# Patient Record
Sex: MALE | Race: BLACK OR AFRICAN AMERICAN | NOT HISPANIC OR LATINO | Employment: UNEMPLOYED | URBAN - METROPOLITAN AREA
[De-identification: names, ages, dates, MRNs, and addresses within clinical notes are randomized per-mention and may not be internally consistent; named-entity substitution may affect disease eponyms.]

---
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
## 2020-08-01 ENCOUNTER — HOSPITAL ENCOUNTER (EMERGENCY)
Facility: MEDICAL CENTER | Age: 20
End: 2020-08-02
Attending: EMERGENCY MEDICINE
Payer: COMMERCIAL

## 2020-08-01 DIAGNOSIS — F15.929 METHAMPHETAMINE INTOXICATION (HCC): ICD-10-CM

## 2020-08-01 PROCEDURE — 99283 EMERGENCY DEPT VISIT LOW MDM: CPT

## 2020-08-01 SDOH — HEALTH STABILITY: MENTAL HEALTH: HOW MANY STANDARD DRINKS CONTAINING ALCOHOL DO YOU HAVE ON A TYPICAL DAY?: 3 OR 4

## 2020-08-01 SDOH — HEALTH STABILITY: MENTAL HEALTH: HOW OFTEN DO YOU HAVE 6 OR MORE DRINKS ON ONE OCCASION?: NEVER

## 2020-08-01 SDOH — HEALTH STABILITY: MENTAL HEALTH: HOW OFTEN DO YOU HAVE A DRINK CONTAINING ALCOHOL?: 2-3 TIMES A WEEK

## 2020-08-01 ASSESSMENT — LIFESTYLE VARIABLES
HAVE PEOPLE ANNOYED YOU BY CRITICIZING YOUR DRINKING: NO
DOES PATIENT WANT TO STOP DRINKING: NO
CONSUMPTION TOTAL: NEGATIVE
HAVE YOU EVER FELT YOU SHOULD CUT DOWN ON YOUR DRINKING: NO
EVER HAD A DRINK FIRST THING IN THE MORNING TO STEADY YOUR NERVES TO GET RID OF A HANGOVER: NO
TOTAL SCORE: 0
HOW MANY TIMES IN THE PAST YEAR HAVE YOU HAD 5 OR MORE DRINKS IN A DAY: 0
TOTAL SCORE: 0
EVER FELT BAD OR GUILTY ABOUT YOUR DRINKING: NO
TOTAL SCORE: 0
DO YOU DRINK ALCOHOL: YES
AVERAGE NUMBER OF DAYS PER WEEK YOU HAVE A DRINK CONTAINING ALCOHOL: 3
ON A TYPICAL DAY WHEN YOU DRINK ALCOHOL HOW MANY DRINKS DO YOU HAVE: 2

## 2020-08-02 VITALS
DIASTOLIC BLOOD PRESSURE: 103 MMHG | HEART RATE: 113 BPM | OXYGEN SATURATION: 94 % | HEIGHT: 70 IN | TEMPERATURE: 98 F | WEIGHT: 154 LBS | BODY MASS INDEX: 22.05 KG/M2 | RESPIRATION RATE: 16 BRPM | SYSTOLIC BLOOD PRESSURE: 146 MMHG

## 2020-08-02 RX ORDER — LORAZEPAM 1 MG/1
1 TABLET ORAL ONCE
Status: DISCONTINUED | OUTPATIENT
Start: 2020-08-02 | End: 2020-08-02 | Stop reason: HOSPADM

## 2020-08-02 NOTE — ED NOTES
Report received, assumed care of patient. Patient refusing to leave leads or SpO2 monitor on. Patient refusing ativan. ERP notified.

## 2020-08-02 NOTE — ED NOTES
Pt discharged to home. Pt given discharge paperwork and all applicable prescriptions written by provider.  Pt to follow up with PCP if indicated in discharge instructions.  Pt verbalized understanding of discharge teaching and will return to ED if condition worsens.

## 2020-08-02 NOTE — ED NOTES
Walked patient out to lobby to DC home. Offered patient socks, patient refused and [place] them [place].

## 2020-08-02 NOTE — ED PROVIDER NOTES
"ED Provider Note    CHIEF COMPLAINT  Chief Complaint   Patient presents with   • Anxiety     Patient admits to meth 3-4x/week for \"awhile\", last smoked at 1400.       HPI  Thomas James Dancer V is a 19 y.o. male who presents to the emergency department for methamphetamine intoxication and \"at this point to get checked out \".  Says that he used earlier today by way of snorting for meth.  States that occasional use of 3-4 times per week.  Denies any other coingestions.  Limited history otherwise secondary to patient's current intoxication.  Denies any other current complaints.    REVIEW OF SYSTEMS  See HPI for further details. All other systems are negative.     PAST MEDICAL HISTORY       SOCIAL HISTORY  Social History     Tobacco Use   • Smoking status: Current Every Day Smoker     Packs/day: 0.50     Years: 2.00     Pack years: 1.00     Types: Cigarettes   • Smokeless tobacco: Never Used   Substance and Sexual Activity   • Alcohol use: Yes     Frequency: 2-3 times a week     Drinks per session: 3 or 4     Binge frequency: Never   • Drug use: Yes     Comment: meth   • Sexual activity: Not on file       SURGICAL HISTORY  patient denies any surgical history    CURRENT MEDICATIONS  Home Medications     Reviewed by Rosy Yee R.N. (Registered Nurse) on 08/01/20 at 2336  Med List Status: <None>   Medication Last Dose Status        Patient Reji Taking any Medications                       ALLERGIES  Allergies   Allergen Reactions   • Bee        PHYSICAL EXAM  VITAL SIGNS: /103   Pulse (!) 113   Temp 36.7 °C (98 °F) (Temporal)   Resp 16   Ht 1.778 m (5' 10\")   Wt 69.9 kg (154 lb)   SpO2 94%   BMI 22.10 kg/m²  @BLAIRE[[id number]::@   Pulse ox interpretation: I interpret this pulse ox as normal.  Constitutional: Alert in no apparent distress.  HENT: No signs of trauma, Bilateral external ears normal, Nose normal.   Eyes: Pupils are equal and reactive, Conjunctiva normal, Non-icteric.   Neck: Normal range of " motion, No tenderness, Supple, No stridor.   Cardiovascular: Regular rate and rhythm, no murmurs.   Thorax & Lungs: Normal breath sounds, No respiratory distress, No wheezing, No chest tenderness.   Abdomen: Bowel sounds normal, Soft, No tenderness, No masses, No pulsatile masses. No peritoneal signs.  Skin: Warm, Dry, No erythema, No rash.   Back: No bony tenderness, No CVA tenderness.   Extremities: Intact distal pulses, No edema, No tenderness, No cyanosis  Musculoskeletal: Good range of motion in all major joints. No tenderness to palpation or major deformities noted.   Neurologic: Alert , spastic motor movement especially out of the face      DIAGNOSTIC STUDIES / PROCEDURES      COURSE & MEDICAL DECISION MAKING  Pertinent Labs & Imaging studies reviewed. (See chart for details)  Patient presented the emergency department with acute methamphetamine intoxication.  Patient states she is approximate 3-4 times per week.  Patient is refusing Ativan.  Patient has had clinical improvement throughout his time of observation.  At this point the patient wishes to leave I will discharge him to the ER with instructions to avoid further use of illicit substances.       The patient will return for worsening symptoms and is stable at the time of discharge. The patient verbalizes understanding and will comply.    FINAL IMPRESSION  1. Methamphetamine intoxication (HCC)            Electronically signed by: Raffaele Cameron M.D., 8/2/2020 12:01 AM
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
## 2020-08-09 ENCOUNTER — HOSPITAL ENCOUNTER (EMERGENCY)
Facility: MEDICAL CENTER | Age: 20
End: 2020-08-09
Attending: EMERGENCY MEDICINE | Admitting: EMERGENCY MEDICINE
Payer: COMMERCIAL

## 2020-08-09 VITALS
SYSTOLIC BLOOD PRESSURE: 114 MMHG | TEMPERATURE: 98.8 F | DIASTOLIC BLOOD PRESSURE: 68 MMHG | BODY MASS INDEX: 21.64 KG/M2 | WEIGHT: 154.54 LBS | HEIGHT: 71 IN | RESPIRATION RATE: 18 BRPM | OXYGEN SATURATION: 98 % | HEART RATE: 63 BPM

## 2020-08-09 DIAGNOSIS — R40.1 STUPOR: ICD-10-CM

## 2020-08-09 DIAGNOSIS — F29 PSYCHOSIS, UNSPECIFIED PSYCHOSIS TYPE (HCC): ICD-10-CM

## 2020-08-09 DIAGNOSIS — F19.10 DRUG ABUSE (HCC): ICD-10-CM

## 2020-08-09 LAB
ALBUMIN SERPL BCP-MCNC: 5.1 G/DL (ref 3.2–4.9)
ALBUMIN/GLOB SERPL: 2 G/DL
ALP SERPL-CCNC: 53 U/L (ref 30–99)
ALT SERPL-CCNC: 36 U/L (ref 2–50)
AMPHET UR QL SCN: POSITIVE
ANION GAP SERPL CALC-SCNC: 18 MMOL/L (ref 7–16)
AST SERPL-CCNC: 31 U/L (ref 12–45)
BARBITURATES UR QL SCN: NEGATIVE
BASOPHILS # BLD AUTO: 0.2 % (ref 0–1.8)
BASOPHILS # BLD: 0.02 K/UL (ref 0–0.12)
BENZODIAZ UR QL SCN: NEGATIVE
BILIRUB SERPL-MCNC: 0.8 MG/DL (ref 0.1–1.5)
BUN SERPL-MCNC: 13 MG/DL (ref 8–22)
BZE UR QL SCN: NEGATIVE
CALCIUM SERPL-MCNC: 10.1 MG/DL (ref 8.5–10.5)
CANNABINOIDS UR QL SCN: NEGATIVE
CHLORIDE SERPL-SCNC: 103 MMOL/L (ref 96–112)
CK SERPL-CCNC: 374 U/L (ref 0–154)
CO2 SERPL-SCNC: 24 MMOL/L (ref 20–33)
CREAT SERPL-MCNC: 0.91 MG/DL (ref 0.5–1.4)
EKG IMPRESSION: NORMAL
EOSINOPHIL # BLD AUTO: 0.01 K/UL (ref 0–0.51)
EOSINOPHIL NFR BLD: 0.1 % (ref 0–6.9)
ERYTHROCYTE [DISTWIDTH] IN BLOOD BY AUTOMATED COUNT: 44.7 FL (ref 35.9–50)
ETHANOL BLD-MCNC: <10.1 MG/DL (ref 0–10.1)
GLOBULIN SER CALC-MCNC: 2.6 G/DL (ref 1.9–3.5)
GLUCOSE BLD-MCNC: 126 MG/DL (ref 65–99)
GLUCOSE SERPL-MCNC: 123 MG/DL (ref 65–99)
HCT VFR BLD AUTO: 48.6 % (ref 42–52)
HGB BLD-MCNC: 16.9 G/DL (ref 14–18)
IMM GRANULOCYTES # BLD AUTO: 0.05 K/UL (ref 0–0.11)
IMM GRANULOCYTES NFR BLD AUTO: 0.4 % (ref 0–0.9)
LYMPHOCYTES # BLD AUTO: 1.28 K/UL (ref 1–4.8)
LYMPHOCYTES NFR BLD: 10.6 % (ref 22–41)
MCH RBC QN AUTO: 31.9 PG (ref 27–33)
MCHC RBC AUTO-ENTMCNC: 34.8 G/DL (ref 33.7–35.3)
MCV RBC AUTO: 91.9 FL (ref 81.4–97.8)
METHADONE UR QL SCN: NEGATIVE
MONOCYTES # BLD AUTO: 0.83 K/UL (ref 0–0.85)
MONOCYTES NFR BLD AUTO: 6.9 % (ref 0–13.4)
NEUTROPHILS # BLD AUTO: 9.86 K/UL (ref 1.82–7.42)
NEUTROPHILS NFR BLD: 81.8 % (ref 44–72)
NRBC # BLD AUTO: 0 K/UL
NRBC BLD-RTO: 0 /100 WBC
OPIATES UR QL SCN: NEGATIVE
OXYCODONE UR QL SCN: NEGATIVE
PCP UR QL SCN: NEGATIVE
PLATELET # BLD AUTO: 251 K/UL (ref 164–446)
PMV BLD AUTO: 9.7 FL (ref 9–12.9)
POTASSIUM SERPL-SCNC: 3.9 MMOL/L (ref 3.6–5.5)
PROPOXYPH UR QL SCN: NEGATIVE
PROT SERPL-MCNC: 7.7 G/DL (ref 6–8.2)
RBC # BLD AUTO: 5.29 M/UL (ref 4.7–6.1)
SODIUM SERPL-SCNC: 145 MMOL/L (ref 135–145)
WBC # BLD AUTO: 12.1 K/UL (ref 4.8–10.8)

## 2020-08-09 PROCEDURE — 85025 COMPLETE CBC W/AUTO DIFF WBC: CPT

## 2020-08-09 PROCEDURE — 700105 HCHG RX REV CODE 258: Performed by: EMERGENCY MEDICINE

## 2020-08-09 PROCEDURE — 700111 HCHG RX REV CODE 636 W/ 250 OVERRIDE (IP)

## 2020-08-09 PROCEDURE — 93005 ELECTROCARDIOGRAM TRACING: CPT | Performed by: EMERGENCY MEDICINE

## 2020-08-09 PROCEDURE — 96376 TX/PRO/DX INJ SAME DRUG ADON: CPT

## 2020-08-09 PROCEDURE — 82550 ASSAY OF CK (CPK): CPT

## 2020-08-09 PROCEDURE — 96375 TX/PRO/DX INJ NEW DRUG ADDON: CPT

## 2020-08-09 PROCEDURE — 80307 DRUG TEST PRSMV CHEM ANLYZR: CPT

## 2020-08-09 PROCEDURE — 700111 HCHG RX REV CODE 636 W/ 250 OVERRIDE (IP): Performed by: EMERGENCY MEDICINE

## 2020-08-09 PROCEDURE — 99285 EMERGENCY DEPT VISIT HI MDM: CPT

## 2020-08-09 PROCEDURE — 90791 PSYCH DIAGNOSTIC EVALUATION: CPT

## 2020-08-09 PROCEDURE — 80053 COMPREHEN METABOLIC PANEL: CPT

## 2020-08-09 PROCEDURE — 96374 THER/PROPH/DIAG INJ IV PUSH: CPT

## 2020-08-09 PROCEDURE — 82962 GLUCOSE BLOOD TEST: CPT

## 2020-08-09 RX ORDER — LORAZEPAM 2 MG/ML
INJECTION INTRAMUSCULAR
Status: COMPLETED
Start: 2020-08-09 | End: 2020-08-09

## 2020-08-09 RX ORDER — SODIUM CHLORIDE 9 MG/ML
1000 INJECTION, SOLUTION INTRAVENOUS ONCE
Status: COMPLETED | OUTPATIENT
Start: 2020-08-09 | End: 2020-08-09

## 2020-08-09 RX ORDER — LORAZEPAM 2 MG/ML
1-2 INJECTION INTRAMUSCULAR
Status: DISCONTINUED | OUTPATIENT
Start: 2020-08-09 | End: 2020-08-10 | Stop reason: HOSPADM

## 2020-08-09 RX ORDER — LORAZEPAM 2 MG/ML
2 INJECTION INTRAMUSCULAR ONCE
Status: COMPLETED | OUTPATIENT
Start: 2020-08-09 | End: 2020-08-09

## 2020-08-09 RX ORDER — HALOPERIDOL 5 MG/ML
5 INJECTION INTRAMUSCULAR ONCE
Status: COMPLETED | OUTPATIENT
Start: 2020-08-09 | End: 2020-08-09

## 2020-08-09 RX ADMIN — LORAZEPAM 2 MG: 2 INJECTION INTRAMUSCULAR; INTRAVENOUS at 09:50

## 2020-08-09 RX ADMIN — LORAZEPAM 2 MG: 2 INJECTION INTRAMUSCULAR; INTRAVENOUS at 14:46

## 2020-08-09 RX ADMIN — LORAZEPAM 2 MG: 2 INJECTION INTRAMUSCULAR; INTRAVENOUS at 09:40

## 2020-08-09 RX ADMIN — SODIUM CHLORIDE 1000 ML: 9 INJECTION, SOLUTION INTRAVENOUS at 09:53

## 2020-08-09 RX ADMIN — HALOPERIDOL LACTATE 5 MG: 5 INJECTION, SOLUTION INTRAMUSCULAR at 14:46

## 2020-08-09 RX ADMIN — LORAZEPAM 2 MG: 2 INJECTION INTRAMUSCULAR at 09:40

## 2020-08-09 ASSESSMENT — LIFESTYLE VARIABLES: DO YOU DRINK ALCOHOL: NO

## 2020-08-09 NOTE — ED NOTES
Pt now resting in bed, no longer as hyperactive. Grandfather remains at bedside, will cont to monitor.

## 2020-08-09 NOTE — ED NOTES
"Pt became agitated, hallucinating swatting \"bugs and fish\". ERP notified. Rounded on pt. Orders received. Pt medicated per MAR. Pt reported urge to urinate. Pt urine sent. Pt now asleep in bed, on monitor. VSS. Will cont to monitor.     L2K placed. Belongings collected. Security called.   "

## 2020-08-09 NOTE — ED TRIAGE NOTES
".  Chief Complaint   Patient presents with   • Altered Mental Status     ./101   Pulse (!) 158   Temp 36.5 °C (97.7 °F) (Temporal)   Resp 16   Ht 1.803 m (5' 11\")   Wt 70.1 kg (154 lb 8.7 oz)   SpO2 96%   BMI 21.55 kg/m²     BIBA grandparent, patient seen here last week, hx of meth use, patient to [facility].    "

## 2020-08-09 NOTE — ED PROVIDER NOTES
"ED Provider Note    Scribed for Jovan Abbasi M.D. by Joanna Colon. 8/9/2020  9:29 AM    Primary care provider: Pcp Pt States None  Means of arrival: Walk-In  History obtained from: Grandparent, patient  History limited by: Altered mental status    CHIEF COMPLAINT  Chief Complaint   Patient presents with   • Altered Mental Status     HPI  Thomas James Dancer V is a 20 y.o. male who presents to the Emergency Department with altered mental status. Grandfather is at bedside and states he saw the patient at 7 am and was able to have a conversation with him at that time however states he seemed \"abnormal.\" Patient has a history of methamphetamine abuse and it is unclear if he used meth this morning. Denies headache or chest pain.     Further history of present illness cannot be obtained due to the patient's altered mental status.     REVIEW OF SYSTEMS  Pertinent positives include altered mental status. Pertinent negatives include no headache or chest pain.    Further ROS cannot be obtained due to the patient's altered mental status.     PAST MEDICAL HISTORY    History reviewed. No pertinent past medical history.    SURGICAL HISTORY  patient denies any surgical history    SOCIAL HISTORY  Social History     Tobacco Use   • Smoking status: Current Every Day Smoker     Packs/day: 0.50     Years: 2.00     Pack years: 1.00     Types: Cigarettes   • Smokeless tobacco: Never Used   Substance Use Topics   • Alcohol use: Yes     Frequency: 2-3 times a week     Drinks per session: 3 or 4     Binge frequency: Never   • Drug use: Yes     Comment: meth      Social History     Substance and Sexual Activity   Drug Use Yes    Comment: meth       FAMILY HISTORY  History reviewed. No pertinent family history.    CURRENT MEDICATIONS  Home Medications    **Home medications have not yet been reviewed for this encounter**         ALLERGIES  Allergies   Allergen Reactions   • Bee        PHYSICAL EXAM  VITAL SIGNS: /101   Pulse " "(!) 158   Temp 36.5 °C (97.7 °F) (Temporal)   Resp 16   Ht 1.803 m (5' 11\")   Wt 70.1 kg (154 lb 8.7 oz)   SpO2 96%   BMI 21.55 kg/m²     Constitutional: Well nourished, Moderate to severe distress. Facial twitching.   HENT: Normocephalic, Atraumatic, Bilateral external ears normal, Oropharynx moist, No oral exudates.   Eyes: PERRLA, EOMI, Conjunctiva normal, No discharge.   Neck: No tenderness, Supple, No stridor.   Lymphatic: No lymphadenopathy noted.   Cardiovascular: Tachycardic, Normal rhythm.   Thorax & Lungs: Clear to auscultation bilaterally, No respiratory distress, No wheezing, No crackles.   Abdomen: Soft, No tenderness, No masses, No pulsatile masses.   Skin: Diaphoretic. No erythema, No rash.   Extremities:, No edema No cyanosis.   Musculoskeletal: No tenderness to palpation or major deformities noted.  Intact distal pulses  Neurologic: Awake, will barely respond with yes or no answers. Seems to be twitching and rigid at times.   Psychiatric: Unable to assess.      LABS  Results for orders placed or performed during the hospital encounter of 08/09/20   URINE DRUG SCREEN (TRIAGE)   Result Value Ref Range    Amphetamines Urine Positive (A) Negative    Barbiturates Negative Negative    Benzodiazepines Negative Negative    Cocaine Metabolite Negative Negative    Methadone Negative Negative    Opiates Negative Negative    Oxycodone Negative Negative    Phencyclidine -Pcp Negative Negative    Propoxyphene Negative Negative    Cannabinoid Metab Negative Negative   Blood Alcohol   Result Value Ref Range    Diagnostic Alcohol <10.1 0.0 - 10.1 mg/dL   CBC WITH DIFFERENTIAL   Result Value Ref Range    WBC 12.1 (H) 4.8 - 10.8 K/uL    RBC 5.29 4.70 - 6.10 M/uL    Hemoglobin 16.9 14.0 - 18.0 g/dL    Hematocrit 48.6 42.0 - 52.0 %    MCV 91.9 81.4 - 97.8 fL    MCH 31.9 27.0 - 33.0 pg    MCHC 34.8 33.7 - 35.3 g/dL    RDW 44.7 35.9 - 50.0 fL    Platelet Count 251 164 - 446 K/uL    MPV 9.7 9.0 - 12.9 fL    " Neutrophils-Polys 81.80 (H) 44.00 - 72.00 %    Lymphocytes 10.60 (L) 22.00 - 41.00 %    Monocytes 6.90 0.00 - 13.40 %    Eosinophils 0.10 0.00 - 6.90 %    Basophils 0.20 0.00 - 1.80 %    Immature Granulocytes 0.40 0.00 - 0.90 %    Nucleated RBC 0.00 /100 WBC    Neutrophils (Absolute) 9.86 (H) 1.82 - 7.42 K/uL    Lymphs (Absolute) 1.28 1.00 - 4.80 K/uL    Monos (Absolute) 0.83 0.00 - 0.85 K/uL    Eos (Absolute) 0.01 0.00 - 0.51 K/uL    Baso (Absolute) 0.02 0.00 - 0.12 K/uL    Immature Granulocytes (abs) 0.05 0.00 - 0.11 K/uL    NRBC (Absolute) 0.00 K/uL   COMP METABOLIC PANEL   Result Value Ref Range    Sodium 145 135 - 145 mmol/L    Potassium 3.9 3.6 - 5.5 mmol/L    Chloride 103 96 - 112 mmol/L    Co2 24 20 - 33 mmol/L    Anion Gap 18.0 (H) 7.0 - 16.0    Glucose 123 (H) 65 - 99 mg/dL    Bun 13 8 - 22 mg/dL    Creatinine 0.91 0.50 - 1.40 mg/dL    Calcium 10.1 8.5 - 10.5 mg/dL    AST(SGOT) 31 12 - 45 U/L    ALT(SGPT) 36 2 - 50 U/L    Alkaline Phosphatase 53 30 - 99 U/L    Total Bilirubin 0.8 0.1 - 1.5 mg/dL    Albumin 5.1 (H) 3.2 - 4.9 g/dL    Total Protein 7.7 6.0 - 8.2 g/dL    Globulin 2.6 1.9 - 3.5 g/dL    A-G Ratio 2.0 g/dL   CREATINE KINASE   Result Value Ref Range    CPK Total 374 (H) 0 - 154 U/L   ESTIMATED GFR   Result Value Ref Range    GFR If African American >60 >60 mL/min/1.73 m 2    GFR If Non African American >60 >60 mL/min/1.73 m 2   ACCU-CHEK GLUCOSE   Result Value Ref Range    Glucose - Accu-Ck 126 (H) 65 - 99 mg/dL   EKG (NOW)   Result Value Ref Range    Report       [facility] Emergency Dept.    Test Date:  2020  Pt Name:    THOMAS DANCER                Department: ER  MRN:        [id number]                      Room:       [facility]  Gender:     Male                         Technician: [id number]  :        2000                   Requested By:RICARDO VIVAR  Order #:    [id number]                    Reading MD: RICARDO VIVAR MD    Measurements  Intervals                                 Axis  Rate:       138                          P:          59  MN:         148                          QRS:        87  QRSD:       90                           T:          -52  QT:         284  QTc:        431    Interpretive Statements  SINUS TACHYCARDIA  LATERAL Q WAVES, PROBABLY NORMAL VARIATION  NONSPECIFIC T ABNORMALITIES, INFERIOR LEADS  No previous ECG available for comparison  Electronically Signed On 8-9-2020 10:20:59 PDT by RICARDO VIVAR MD       All labs reviewed by me.    COURSE & MEDICAL DECISION MAKING  Pertinent Labs & Imaging studies reviewed. (See chart for details)    I reviewed the patient's medical records which showed a history of of methamphetamine abuse. He was here 8 days ago.     9:29 AM - Patient seen and examined at bedside. Patient will be treated with Ativan 2 mg and IV fluids 1,000 mL. Ordered estimated GFR, urine drug screen, blood alcohol, CBC with differential, CMP, creatine kinase, accu-chek glucose, and EKG to evaluate his symptoms. The differential diagnoses include but are not limited to: drug overdose, dehydration, rhabdomyolysis.     HYDRATION: Based on the patient's presentation of Tachycardia the patient was given IV fluids. IV Hydration was used because oral hydration was not as rapid as required. Upon recheck following hydration, the patient was improved.    11:18 AM - Recheck. Facial twitching has resolved and his HR is down to 90's. He is feeling much better after treated with IV fluids and medication. Plan is to continue to observe as he becomes more alert.     11:54 AM - Spoke with the alert team who will evaluate and provide the appropriate resources.     2:40 PM - Wrote for a legal hold for the patient. Ordered for Haldol 5 mg.     Decision Making:  Patient with altered mental status, very diaphoretic, tachycardic, give the patient multiple doses of Ativan, seems to be on a sympathomimetic.  Likely this is methamphetamine.  The patient is not  an rhabdomyolysis, the patient was given IV fluids due to tachycardia, the patient calmed down significantly with multiple doses of Ativan, the alert team came and evaluated the patient, they are concerned about underlying psychosis that is exacerbated by the methamphetamines, we do not believe the patient can take care of himself, we will place the patient on a legal hold, give the patient some Haldol for agitation.  Patient will be either transfer to psychiatric facility or seen by psychiatrist here in the hospital.    DISPOSITION:  Patient is placed on a legal hold. Patient care will be transferred.       FINAL IMPRESSION  1. Stupor    2. Drug abuse (HCC)    3. Psychosis, unspecified psychosis type (HCC)          Joanna MELISSA (Leah), am scribing for, and in the presence of, Jovan Abbasi M.D..    Electronically signed by: Joanna Colon (Leah), 8/9/2020    IJovan M.D. personally performed the services described in this documentation, as scribed by Joanna Colon in my presence, and it is both accurate and complete. C    The note accurately reflects work and decisions made by me.  Jovan Abbasi M.D.  8/9/2020  5:07 PM

## 2020-08-09 NOTE — ED NOTES
Pt grandfather needed to go to work. Reports he cannot help pt, that the pt does not live with him, he is on the streets and he cannot take him home like this.   Provided him with pods number to check back in. Will notify ERP.

## 2020-08-09 NOTE — ED NOTES
Pt was brought back to room, extremely diaphoretic, tachypneic, tachycardic. Grandfather at bedside. Reports he was somewhat normal around 0700 this morning then believes he snorted some meth. Requesting programs to help pt.   PIV initiated, blood drawn and sent to lab. Pt medicated with a total of 4mg ativan. NS bolus.
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
## 2020-08-10 NOTE — CONSULTS
[facility]   TRIAGE ASSESSMENT    Name: Thomas James Dancer V  MRN: [id number]  : 2000  Age: 20 y.o.  Date of assessment: 2020  PCP: Pcp Pt States None  Persons in attendance: Patient and grandfather    CHIEF COMPLAINT/PRESENTING ISSUE (as stated by grandfather, rn, erp): Pt presents in the er with grandfather in a completely altered mental state. He is unable to carry on a conversation and appears to be responding to internal stimuli. He has a hx of meth abuse. It is unclear when he used last. His grandfather claims he was able to converse somewhat early this am but has since digressed. Presently he is clearly unable to care for himself and was becoming more agitated but not aggressive at all. He appeared to be having some vivid, visual hallucinations. He was administered haldol which appeared to settle him down.     Chief Complaint   Patient presents with   • Altered Mental Status        CURRENT LIVING SITUATION/SOCIAL SUPPORT: Pt was living with his father but thay conflicted and he sent out to live in the streets. His mother lives in [place] but is involved in drug abuse. His only viable support appears to be his grandfather. But his grandfather does not feel he can deal with him in his present state.    BEHAVIORAL HEALTH TREATMENT HISTORY  Does patient/parent report a history of prior behavioral health treatment for patient?   No: no hx noted    SAFETY ASSESSMENT - SELF  Does patient acknowledge current or past symptoms of dangerousness to self? no  Does parent/significant other report patient has current or past symptoms of dangerousness to self? no  Does presenting problem suggest symptoms of dangerousness to self? No not hx of self harm noted by grandfather and no access to a firearm contends his grandfather, to the best of his knowledge.    SAFETY ASSESSMENT - OTHERS  Does patient acknowledge current or past symptoms of aggressive behavior or risk to others? no  Does  "parent/significant other report patient has current or past symptoms of aggressive behavior or risk to others?  N\A  Does presenting problem suggest symptoms of dangerousness to others? No per grandfather    Crisis Safety Plan completed and copy given to patient? No on a hold    ABUSE/NEGLECT SCREENING  Does patient report feeling “unsafe” in his/her home, or afraid of anyone?  no  Does patient report any history of physical, sexual, or emotional abuse?  No per grandfather  Does parent or significant other report any of the above? N\A  Is there evidence of neglect by self?  no  Is there evidence of neglect by a caregiver? no  Does the patient/parent report any history of CPS/APS/police involvement related to suspected abuse/neglect or domestic violence? no  Based on the information provided during the current assessment, is a mandated report of suspected abuse/neglect being made?  No    SUBSTANCE USE SCREENING  Yes:  Jim all substances used in the past 30 days: hx of meth abuse      Last Use Amount   []   Alcohol     []   Marijuana     []   Heroin     []   Prescription Opioids  (used without prescription, for    recreation, or in excess of prescribed amount)     []   Other Prescription  (used without prescription, for    recreation, or in excess of prescribed amount)     []   Cocaine      [x]   Methamphetamine recently na   []   \"\" drugs (ectasy, MDMA)     []   Other substances        UDS results: pos for meth  Breathalyzer results: 0.01    What consequences does the patient associate with any of the above substance use and or addictive behaviors? Relationship problems: , Family problems: , Health problems:, Monetary problems:     Risk factors for detox (check all that apply):  []  Seizures   []  Diaphoretic (sweating)   []  Tremors   []  Hallucinations   []  Increased blood pressure   []  Decreased blood pressure   []  Other   [x]  None      [] Patient education on risk factors for detoxification and " instructed to return to ER as needed.na      MENTAL STATUS   Participation: No verbal participation and too psychotic to converse  Grooming: Casual  Orientation: Confused, Disoriented to: name , time and place and Evidence of hallucinations present  Behavior: Agitated, Tense and Hyperactive  Eye contact: Poor  Mood: Depressed, Anxious and Manic  Affect: Constricted, Labile, Sad and Anxious  and incongruent.                                                                                                                    Thought process: acute psychosis  Thought content: Paranoia and hallucinations  Speech: Pressured, Hypotalkative, Latency of response and unable to converse  Perception: Evidence of auditory hallucination and Evidence of hallucination  Memory:  Poor memory for chronology of events  Insight: Poor  Judgment:  Poor  Other:    Collateral information:   Source:  [x] Significant other present in person:   [] Significant other by telephone  [] [facility]   [x] [facility] Nursing Staff  [x] [facility]  [x] Other: erp    [] Unable to complete full assessment due to:  [] Acute intoxication  [] Patient declined to participate/engage  [] Patient verbally unresponsive  [] Significant cognitive deficits  [x] Significant perceptual distortions or behavioral disorganization  [] Other:      CLINICAL IMPRESSIONS:  Primary: acute psychosis  Secondary: depression and anxiety        IDENTIFIED NEEDS/PLAN:  [Trigger DISPOSITION list for any items marked]    []  Imminent safety risk - self [] Imminent safety risk - others   [x]  Acute substance withdrawal [x]  Psychosis/Impaired reality testing   [x]  Mood/anxiety [x]  Substance use/Addictive behavior   [x]  Maladaptive behaviro [x]  Parent/child conflict   [x]  Family/Couples conflict []  Biomedical   [x]  Housing [x]  Financial   []   Legal  Occupational/Educational   []  Domestic violence []  Other:     Disposition: Actively being addressed by Legal Hold  and [facility]    Does patient express agreement with the above plan? No too psychotic    Referral appointment(s) scheduled? no    Alert team only:   I have discussed findings and recommendations with Dr. Abbasi who is in agreement with these recommendations. 20y male present swith acute psychosis and is unable to care for himself. [facility] has been placed on a legal hold [facility]    Referral information sent to the following community providers :per     If applicable : Referred  to : Bri for legal hold follow up at 1630      Jim ABRAHAM Salcedo R.N.  8/9/2020

## 2020-08-10 NOTE — ED PROVIDER NOTES
ED Provider Note    Medical decision making    The patient was asking to be reevaluated as he states that he got into a bad batch of amphetamines causing his psychosis.  The patient is alert and appropriate at this time.  He is insightful and does not have any suicidal or homicidal ideation.  I spoke with his grandpa and his grandpa states that he is not welcome to stay at his house.  He states this is a recurrent problem.  I discussed with the grandpa that a psych hold will not help his substance abuse problem.  I offered the patient to go to [facility] behavioral [facility] versus [facility] and he states he like to follow-up tomorrow.  At this time the patient does not meet criteria for further legal hold.  He is alert and appropriate.  He will be discharged with instructions to follow-up with [facility] and [facility] behavioral health.

## 2020-08-10 NOTE — ED NOTES
Pt discharged home. Pt verbalized understand of discharge and medication instructions, all questions addressed. Pt advised to follow-up with PCP or return to ED for any new or worsening of symptoms. Pt is ambulating well and steady on feet. VSS. PIV removed.

## 2020-08-10 NOTE — ED NOTES
Pt took monitor off, stood up and tried to leave. States he just had a bad high and wants to go home. Educated on legal hold and why family/staff is concerned about his ability to care for self. ERP notified, will come talk to pt.

## 2020-08-10 NOTE — DISCHARGE PLANNING
Medical Social Work    Referral: Legal Hold    Intervention: Legal Hold Paperwork given to SW by [facility] RN: Jim    Legal Hold Initiated: Date: 08/09/2020  Time: 1440    Legal Hold faxed: Date: 08/09/2020  Time: 2012    Patient’s Insurance Listed on Face Sheet: None    Referrals sent to: [facility] and [facility]    Plan: Patient will transfer to mental health facility once acceptance is obtained.

## 2020-08-10 NOTE — DISCHARGE PLANNING
Medical Social Work    MSW received a copy of pt's legal hold from Alert Team.  Unable to send referrals until Alert Team assessment is complete and pt is registered.

## 2020-08-10 NOTE — ED NOTES
Legal hold discontinued by ERP. Pt has insight on events that occurred this am. States he had a bad high. Amb with steady gait. A&ox4, GCS 15.

## 2020-08-12 ENCOUNTER — HOSPITAL ENCOUNTER (EMERGENCY)
Facility: MEDICAL CENTER | Age: 20
End: 2020-08-12
Attending: EMERGENCY MEDICINE
Payer: COMMERCIAL

## 2020-08-12 VITALS
OXYGEN SATURATION: 97 % | DIASTOLIC BLOOD PRESSURE: 62 MMHG | SYSTOLIC BLOOD PRESSURE: 116 MMHG | RESPIRATION RATE: 18 BRPM | HEART RATE: 75 BPM | WEIGHT: 160 LBS | BODY MASS INDEX: 22.4 KG/M2 | TEMPERATURE: 97.6 F | HEIGHT: 71 IN

## 2020-08-12 DIAGNOSIS — F15.10 METHAMPHETAMINE ABUSE (HCC): ICD-10-CM

## 2020-08-12 DIAGNOSIS — R44.3 HALLUCINATIONS: ICD-10-CM

## 2020-08-12 PROCEDURE — 99284 EMERGENCY DEPT VISIT MOD MDM: CPT

## 2020-08-12 PROCEDURE — 90791 PSYCH DIAGNOSTIC EVALUATION: CPT

## 2020-08-12 PROCEDURE — 700102 HCHG RX REV CODE 250 W/ 637 OVERRIDE(OP): Performed by: EMERGENCY MEDICINE

## 2020-08-12 PROCEDURE — A9270 NON-COVERED ITEM OR SERVICE: HCPCS | Performed by: EMERGENCY MEDICINE

## 2020-08-12 RX ORDER — LORAZEPAM 2 MG/1
2 TABLET ORAL ONCE
Status: COMPLETED | OUTPATIENT
Start: 2020-08-12 | End: 2020-08-12

## 2020-08-12 RX ADMIN — LORAZEPAM 2 MG: 2 TABLET ORAL at 04:27

## 2020-08-12 ASSESSMENT — FIBROSIS 4 INDEX: FIB4 SCORE: 0.41

## 2020-08-12 NOTE — ED TRIAGE NOTES
TALIB AZEVEDO, pt called c/o meth withdraw.  Pt pacing around triage, fist clenched, mumbling incomprehensible words to himself, pt last meth use 3 days ago

## 2020-08-12 NOTE — ED NOTES
Pt still appears tired and somnolent and only verbal to registration to provide demographics but non-verbal to this RN.  Meal provided to pt at the bedside.

## 2020-08-12 NOTE — CONSULTS
[facility]   TRIAGE ASSESSMENT    Name: Thomas James Dancer V  MRN: [id number]  : 2000  Age: 20 y.o.  Date of assessment: 2020  PCP: Pcp Pt States None  Persons in attendance: Patient    CHIEF COMPLAINT/PRESENTING ISSUE (as stated by patient): 20 year old male BIB EMS today d/t disorganized thoughts, behaviors, current Methamphetamine use; voluntary pt;  UDS + amphetamines; pt received Ativan 2 mg PO today at 0427; alert, oriented to person, place, disoriented to date, some recent events; internally preoccupied; generalized paranoia; denies SI, HI, self-harm ideation or h/o self-harm or SA; insight, judgement adequate; denies current outpt MH providers or psych meds; denies h/o inpt MH or CD tx; states current substance use includes Methamphetamines smoking for several days with last use 8/10/2020; states lives with his grandfather      Chief Complaint   Patient presents with   • Psych Eval   • Drug Abuse        CURRENT LIVING SITUATION/SOCIAL SUPPORT:  states lives with his grandfather      BEHAVIORAL HEALTH TREATMENT HISTORY  Does patient/parent report a history of prior behavioral health treatment for patient?   No:    SAFETY ASSESSMENT - SELF  Does patient acknowledge current or past symptoms of dangerousness to self? no  Does parent/significant other report patient has current or past symptoms of dangerousness to self? N\A  Does presenting problem suggest symptoms of dangerousness to self? No    SAFETY ASSESSMENT - OTHERS  Does patient acknowledge current or past symptoms of aggressive behavior or risk to others? no  Does parent/significant other report patient has current or past symptoms of aggressive behavior or risk to others?  N\A  Does presenting problem suggest symptoms of dangerousness to others? No    Crisis Safety Plan completed and copy given to patient? no    ABUSE/NEGLECT SCREENING  Does patient report feeling “unsafe” in his/her home, or afraid of anyone?  no  Does patient  "report any history of physical, sexual, or emotional abuse?  no  Does parent or significant other report any of the above? N\A  Is there evidence of neglect by self?  no  Is there evidence of neglect by a caregiver? no  Does the patient/parent report any history of CPS/APS/police involvement related to suspected abuse/neglect or domestic violence? no  Based on the information provided during the current assessment, is a mandated report of suspected abuse/neglect being made?  No    SUBSTANCE USE SCREENING  Yes:  Jim all substances used in the past 30 days:      Last Use Amount   []   Alcohol     []   Marijuana     []   Heroin     []   Prescription Opioids  (used without prescription, for    recreation, or in excess of prescribed amount)     []   Other Prescription  (used without prescription, for    recreation, or in excess of prescribed amount)     []   Cocaine      [x]   Methamphetamine 8/10/2020    []   \"\" drugs (ectasy, MDMA)     []   Other substances        UDS results: + Amphetaminted  Breathalyzer results: negative    What consequences does the patient associate with any of the above substance use and or addictive behaviors? None    Risk factors for detox (check all that apply):  []  Seizures   [x]  Diaphoretic (sweating)   []  Tremors   [x]  Hallucinations   [x]  Increased blood pressure   [x]  Decreased blood pressure   []  Other   []  None      [] Patient education on risk factors for detoxification and instructed to return to ER as needed.      MENTAL STATUS   Participation: Active verbal participation, Attentive and Open to feedback  Grooming: Casual and Neat  Orientation: Alert and Disoriented to: date, recent events  Behavior: anxous  Eye contact: Good  Mood: Anxious  Affect: Blunted, Flat, Congruent with content and Anxious  Thought process: Logical, Goal-directed and Circumstantial  Thought content: Within normal limits, Preoccupation and Paranoia  Speech: Rate within normal limits and Volume " within normal limits  Perception: Within normal limits  Memory:  No gross evidence of memory deficits  Insight: Adequate  Judgment:  Adequate  Other:    Collateral information:   Source:  [] Significant other present in person:   [] Significant other by telephone  [] [facility]   [x] [facility] Staff  [x] [facility]  [] Other:     [] Unable to complete full assessment due to:  [] Acute intoxication  [] Patient declined to participate/engage  [] Patient verbally unresponsive  [] Significant cognitive deficits  [] Significant perceptual distortions or behavioral disorganization  [] Other:      CLINICAL IMPRESSIONS:  Primary:  Amphetamine use d/o   Secondary:         IDENTIFIED NEEDS/PLAN:  [Trigger DISPOSITION list for any items marked]    []  Imminent safety risk - self [] Imminent safety risk - others   []  Acute substance withdrawal []  Psychosis/Impaired reality testing   []  Mood/anxiety [x]  Substance use/Addictive behavior   [x]  Maladaptive behaviro []  Parent/child conflict   []  Family/Couples conflict []  Biomedical   []  Housing []  Financial   []   Legal  Occupational/Educational   []  Domestic violence []  Other:     [facility]: [facility] Writer RN reviewed community CD resources with pt, with written information given; pt encouraged to attend [facility] program today with transport by [facility] which pt declined; states he wants to review the program with his grandfather who can bring him there; pt to DC to self today  Later, pt unable to contact his grandfather; pt then agreeable to DC to [facility] today with transport by taxi    Does patient express agreement with the above plan? yes    Referral appointment(s) scheduled? no    Alert team only:   I have discussed findings and recommendations with Dr. Murdock who is in agreement with these recommendations. Pt is a voluntary pt    Referral information sent to the following community providers : [facility]    If applicable : Referred  to :PRUDENCE Duarte R.N.  8/12/2020

## 2020-08-12 NOTE — ED NOTES
Report received from Carmel RN, assumed care of patient.  Bed in lowest position.  Pt resting comfortably on gurney.

## 2020-08-12 NOTE — ED NOTES
Pt requests RN to contact his grandfather for transportation to [facility].  Family phone number not located - registration notified.

## 2020-08-12 NOTE — ED NOTES
Received report from Manny LUCAS assumed care done.  Resting in bed comfortably, respiration unlabored. Pt cooperative when vitals are taken.  Warm blanket provided to pt. Will continue to monitor.

## 2020-08-12 NOTE — ED NOTES
Per erp, possible plan once pt is more awake is to get into [facility] Will continue to monitor.

## 2020-08-12 NOTE — ED PROVIDER NOTES
"ED Provider Note    ED Provider Note    Primary care provider: Pcp Pt States None  Means of arrival: EMS  History obtained from: patient  History limited by: paranoia, drug abuse    CHIEF COMPLAINT  Chief Complaint   Patient presents with   • Psych Eval   • Drug Abuse       HPI  Thomas James Dancer V is a 20 y.o. male who presents to the Emergency Department via EMS.  The details of EMSs involvement is not known to me.  Patient cannot tell me.  There is little information available from the patient.  He does admit to methamphetamine use.  He is does not tell me when asked about other medical problems.  He states that \"I need to get a hold of my grandfather\".  However, he does not have a phone number, does not have a phone and does not know how to reach him.  There are no phone numbers for his grandfather that I can detect in the chart.  Patient has normal vital signs.  Initially, he was tachycardic this has since resolved.      REVIEW OF SYSTEMS  Review of Systems   Unable to perform ROS: Psychiatric disorder       PAST MEDICAL HISTORY   Methamphetamine abuse    SURGICAL HISTORY  patient denies any surgical history    SOCIAL HISTORY  Social History     Tobacco Use   • Smoking status: Current Every Day Smoker     Packs/day: 0.50     Years: 2.00     Pack years: 1.00     Types: Cigarettes   • Smokeless tobacco: Never Used   Substance Use Topics   • Alcohol use: Yes     Frequency: 2-3 times a week     Drinks per session: 3 or 4     Binge frequency: Never   • Drug use: Yes     Comment: meth      Social History     Substance and Sexual Activity   Drug Use Yes    Comment: meth       FAMILY HISTORY  No family history on file.    CURRENT MEDICATIONS  Home Medications    **Home medications have not yet been reviewed for this encounter**         ALLERGIES  Allergies   Allergen Reactions   • Bee        PHYSICAL EXAM  VITAL SIGNS: /76   Pulse 85   Temp 36.4 °C (97.6 °F) (Temporal)   Resp 16   Ht 1.803 m (5' 11\")   Wt " "72.6 kg (160 lb)   SpO2 100%   BMI 22.32 kg/m²   Vitals reviewed.  Constitutional: Patient is oriented to person, place. Appears well-developed and well-nourished. Mild distress.  Hypervigilant.  Head: Normocephalic and atraumatic.   Ears: Normal external ears bilaterally.   Mouth/Throat: Oropharynx is clear and moist  Eyes: Conjunctivae are normal.   Neck: Normal range of motion.   Cardiovascular: Normal rate, regular rhythm and normal heart sounds.   Pulmonary/Chest: Effort normal and breath sounds normal. No respiratory distress  Musculoskeletal: No edema  Neurological: No focal deficits. STERLING.  Skin: Skin is warm and dry.  Psychiatric: Able to assess SI or HI.  Patient appears paranoid.  He is talking to himself alone in the room.  He is fixated on calling his grandfather.    COURSE & MEDICAL DECISION MAKING  Pertinent Labs & Imaging studies reviewed. (See chart for details)    Obtained and reviewed past medical records.  Patient presented here just a few days ago on August 9 with altered mental status.  He had a metabolic work-up which was unrevealing, other than an elevation in his white blood cell count.  He was not anemic.  His anion gap was elevated at 18.  He did test positive for methamphetamines.  He was accompanied by his grandfather at the time.  There is note from the previous ERP, that the grandfather had voiced, that he was \"not welcome to stay at his house\".  That his substance abuse was a recurring issue.    4:06 AM - Patient seen and examined at bedside.  Sitting up.  He has normal vital signs.  He intermittently, stares off into space then will redirect his attention to my questioning.  He is unable to give me any useful past medical history.  He is observed, responding to external stimuli.  He repeatedly, asks to call his grandfather.  However, I do not have a contact number for him.  He insists, this needs to happen right now.  Patient admittedly, you did used methamphetamines quite recently " although he initially stated that it has been a few days.  Patient will be treated with p.o. Ativan.  He does agree to take this medication.  I have spoken with the alert team.  The [facility], opens at 9 AM and this may be an option.  Patient does voice to me, that he is interested in cessation of methamphetamine use.    12:10 PM patient continues to sleep.  He has not been combative or disruptive, since receiving Ativan.  Plan for evaluation by the alert team, once the patient helene and awakens.  At this point, he is unable to care for himself but he has not voiced any harmful statements.  This may be simply meth induced and if so, patient will likely be discharged home if not, will hold can be initiated.  Patient care will be signed out to my colleague, Dr. Murdock for disposition.    FINAL IMPRESSION  1. Methamphetamine abuse (HCC)    2. Hallucinations

## 2020-08-12 NOTE — ED PROVIDER NOTES
ED PROVIDER NOTE    Scribed for Keven Murdock M.D. by Ale Garcia. 8/12/2020, 3:29 PM.    This is an addendum to the note on Thomas James Dancer V. For further details and full chart entry, see the previously signed ED Provider Note written by Dr. Song ([facility]).      I reexamined the patient, who is now mentally cleared.  He was evaluated by the alert team, and appears to be safe for discharge.  On repeat examination, he is alert, oriented, demonstrates no toxidrome or high risk features.  We will plan for discharge.    FINAL IMPRESSION   1. Methamphetamine abuse (HCC)     2. Hallucinations           DISPOSITION:  Patient will be discharged home in stable condition.    FOLLOW UP:  [facility]   [address] · [place] [address]   [phone]        [facility], Emergency Dept  [address] [id number] [phone]    If symptoms worsen      OUTPATIENT MEDICATIONS:  There are no discharge medications for this patient.         Ale MELISSA (Scribe), am scribing for, and in the presence of, Keven Murdock M.D..    Electronically signed by: Ale Garcia (Escobaribe), 8/12/2020    Keven MELISSA M.D. personally performed the services described in this documentation, as scribed by Ale Garcia in my presence, and it is both accurate and complete.    The note accurately reflects work and decisions made by me.  Keven Murdock M.D.  8/12/2020  8:50 PM

## 2020-08-12 NOTE — ED NOTES
Patient medicated per MAR. It took 15 minutes to convince the patient the medication was safe to take. The patient continues to stated that he wants to get ahold of his grandfather

## 2020-08-12 NOTE — ED NOTES
"Pt sitting upright on gurney, consuming ED box lunch.  Pt states \" do you have any lobster? \"   "

## 2020-08-12 NOTE — ED NOTES
"Patient admits to using Meth last night and states he does not usually use this drug. The patient presents with paranoia and states \"I don't trust you guys\". The patient is currently stating he feels \"trouble breathing\".  "

## 2020-08-12 NOTE — DISCHARGE INSTRUCTIONS
You were seen in emergency department for altered mental status and hallucinations in the setting of methamphetamine use.  This is now worn off and you have been medically cleared.  You have been cleared by our psychiatric team.  We recommend that you get inpatient stabilization at [facility].  Please contact the number on the sheet provided to you to see if they have space for you.    Please avoid recreational drug use, including methamphetamine.    Please return to the emergency department or seek medical attention if you develop:  Chest pain, difficulty breathing, fevers, any other new or concerning findings    ================================  Coronavirus Information    Your visit did NOT appear to relate to coronavirus, but if you or your family develop symptoms that concern you for coronavirus (please see CDC website for symptoms), please contact the [facility] hotline (or your local health department)  or your healthcare provider before going to a medical facility:    [facility]  24hr hotline: [phone]    Information is available from the Centers for Disease Control and Prevention  www.CDC.gov    and     [facility]  [email or web address]    If you are severely ill or having a hard time breathing, please immediatly seek medical care. Notify the  or Emergency Department Triage about your symptoms.

## 2020-08-12 NOTE — ED NOTES
Patient rounded on and is sleeping on the gurney. The patient reports he has not slept for two straight days. This is the first time during this admission the patient has been sleeping. During this visit, vital signs have been stable, the patient has been AOx4 with a flat affect. Vital signs will not be checked at this time to allow the patient to sleep.

## 2020-08-13 NOTE — ED NOTES
All lines and monitors disconnected.  Discharge instructions were reviewed, questions answered.  Pt states all belongings in possession.  Pt ambulates to the lobby(to taxi cab), escorted by RN.  Pt provided with transportation by [employer] directly to [place]

## 2021-02-10 ENCOUNTER — HOSPITAL ENCOUNTER (EMERGENCY)
Dept: HOSPITAL 8 - ED | Age: 21
Discharge: HOME | End: 2021-02-10
Payer: SELF-PAY

## 2021-02-10 VITALS — HEIGHT: 72 IN | BODY MASS INDEX: 22.51 KG/M2 | WEIGHT: 166.23 LBS

## 2021-02-10 VITALS — SYSTOLIC BLOOD PRESSURE: 125 MMHG | DIASTOLIC BLOOD PRESSURE: 83 MMHG

## 2021-02-10 DIAGNOSIS — S51.851A: Primary | ICD-10-CM
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
## 2021-02-10 DIAGNOSIS — S51.052A: ICD-10-CM
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
## 2021-02-10 DIAGNOSIS — S71.151A: ICD-10-CM
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
## 2021-02-10 DIAGNOSIS — S71.152A: ICD-10-CM
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
## 2021-02-10 PROCEDURE — 90675 RABIES VACCINE IM: CPT

## 2021-02-10 PROCEDURE — 99284 EMERGENCY DEPT VISIT MOD MDM: CPT

## 2021-02-10 PROCEDURE — 90471 IMMUNIZATION ADMIN: CPT

## 2021-02-10 PROCEDURE — 90472 IMMUNIZATION ADMIN EACH ADD: CPT

## 2021-02-10 PROCEDURE — 96372 THER/PROPH/DIAG INJ SC/IM: CPT

## 2021-02-10 PROCEDURE — 90375 RABIES IG IM/SC: CPT

## 2021-02-10 PROCEDURE — 90715 TDAP VACCINE 7 YRS/> IM: CPT

## 2021-02-13 ENCOUNTER — HOSPITAL ENCOUNTER (EMERGENCY)
Dept: HOSPITAL 8 - ED | Age: 21
Discharge: HOME | End: 2021-02-13
Payer: SELF-PAY

## 2021-02-13 VITALS — HEIGHT: 72 IN | BODY MASS INDEX: 22.57 KG/M2 | WEIGHT: 166.67 LBS
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
## 2021-02-13 DIAGNOSIS — S50.871A: Primary | ICD-10-CM

## 2021-02-13 DIAGNOSIS — S81.812A: ICD-10-CM

## 2021-02-13 DIAGNOSIS — S50.872A: ICD-10-CM

## 2021-02-13 PROCEDURE — 99283 EMERGENCY DEPT VISIT LOW MDM: CPT

## 2021-02-13 PROCEDURE — 90675 RABIES VACCINE IM: CPT

## 2021-02-13 PROCEDURE — 99282 EMERGENCY DEPT VISIT SF MDM: CPT

## 2021-02-22 ENCOUNTER — HOSPITAL ENCOUNTER (EMERGENCY)
Dept: HOSPITAL 8 - ED | Age: 21
Discharge: HOME | End: 2021-02-22
Payer: SELF-PAY

## 2021-02-22 VITALS — HEIGHT: 70 IN | BODY MASS INDEX: 22.12 KG/M2 | WEIGHT: 154.54 LBS

## 2021-02-22 VITALS — DIASTOLIC BLOOD PRESSURE: 86 MMHG | SYSTOLIC BLOOD PRESSURE: 143 MMHG

## 2021-02-22 DIAGNOSIS — S51.851D: ICD-10-CM

## 2021-02-22 DIAGNOSIS — Z23: Primary | ICD-10-CM

## 2021-02-22 DIAGNOSIS — S71.152D: ICD-10-CM
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
## 2021-02-22 DIAGNOSIS — S51.852D: ICD-10-CM

## 2021-02-22 PROCEDURE — 99283 EMERGENCY DEPT VISIT LOW MDM: CPT

## 2021-02-22 PROCEDURE — 90675 RABIES VACCINE IM: CPT

## 2021-02-22 PROCEDURE — 90471 IMMUNIZATION ADMIN: CPT

## 2021-02-22 PROCEDURE — 99281 EMR DPT VST MAYX REQ PHY/QHP: CPT

## 2021-02-24 ENCOUNTER — HOSPITAL ENCOUNTER (EMERGENCY)
Dept: HOSPITAL 8 - ED | Age: 21
Discharge: LEFT BEFORE DISCHARGE | End: 2021-02-24
Payer: SELF-PAY

## 2021-02-24 DIAGNOSIS — Z53.21: ICD-10-CM

## 2021-02-24 DIAGNOSIS — R68.89: Primary | ICD-10-CM

## 2021-02-26 ENCOUNTER — HOSPITAL ENCOUNTER (EMERGENCY)
Dept: HOSPITAL 8 - ED | Age: 21
Discharge: HOME | End: 2021-02-26
Payer: SELF-PAY

## 2021-02-26 VITALS — DIASTOLIC BLOOD PRESSURE: 69 MMHG | SYSTOLIC BLOOD PRESSURE: 116 MMHG

## 2021-02-26 VITALS — HEIGHT: 72 IN | BODY MASS INDEX: 20.36 KG/M2 | WEIGHT: 150.36 LBS

## 2021-02-26 DIAGNOSIS — S51.852D: ICD-10-CM
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
## 2021-02-26 DIAGNOSIS — S71.152D: Primary | ICD-10-CM

## 2021-02-26 PROCEDURE — 90471 IMMUNIZATION ADMIN: CPT

## 2021-02-26 PROCEDURE — 90675 RABIES VACCINE IM: CPT

## 2021-02-26 PROCEDURE — 99281 EMR DPT VST MAYX REQ PHY/QHP: CPT

## 2021-04-01 ENCOUNTER — HOSPITAL ENCOUNTER (EMERGENCY)
Facility: MEDICAL CENTER | Age: 21
End: 2021-04-02
Attending: EMERGENCY MEDICINE
Payer: COMMERCIAL

## 2021-04-01 DIAGNOSIS — F15.10 METHAMPHETAMINE ABUSE (HCC): ICD-10-CM

## 2021-04-01 DIAGNOSIS — R00.0 TACHYCARDIA: ICD-10-CM

## 2021-04-01 LAB
ALBUMIN SERPL BCP-MCNC: 4.8 G/DL (ref 3.2–4.9)
ALBUMIN/GLOB SERPL: 1.6 G/DL
ALP SERPL-CCNC: 66 U/L (ref 30–99)
ALT SERPL-CCNC: 31 U/L (ref 2–50)
ANION GAP SERPL CALC-SCNC: 21 MMOL/L (ref 7–16)
AST SERPL-CCNC: 82 U/L (ref 12–45)
BASOPHILS # BLD AUTO: 0.3 % (ref 0–1.8)
BASOPHILS # BLD: 0.04 K/UL (ref 0–0.12)
BILIRUB SERPL-MCNC: 2.6 MG/DL (ref 0.1–1.5)
BUN SERPL-MCNC: 17 MG/DL (ref 8–22)
CALCIUM SERPL-MCNC: 10.1 MG/DL (ref 8.5–10.5)
CHLORIDE SERPL-SCNC: 99 MMOL/L (ref 96–112)
CO2 SERPL-SCNC: 17 MMOL/L (ref 20–33)
CREAT SERPL-MCNC: 1 MG/DL (ref 0.5–1.4)
EKG IMPRESSION: NORMAL
EOSINOPHIL # BLD AUTO: 0.01 K/UL (ref 0–0.51)
EOSINOPHIL NFR BLD: 0.1 % (ref 0–6.9)
ERYTHROCYTE [DISTWIDTH] IN BLOOD BY AUTOMATED COUNT: 38.5 FL (ref 35.9–50)
GLOBULIN SER CALC-MCNC: 3 G/DL (ref 1.9–3.5)
GLUCOSE SERPL-MCNC: 90 MG/DL (ref 65–99)
HCT VFR BLD AUTO: 47 % (ref 42–52)
HGB BLD-MCNC: 17.2 G/DL (ref 14–18)
IMM GRANULOCYTES # BLD AUTO: 0.05 K/UL (ref 0–0.11)
IMM GRANULOCYTES NFR BLD AUTO: 0.3 % (ref 0–0.9)
LYMPHOCYTES # BLD AUTO: 3.98 K/UL (ref 1–4.8)
LYMPHOCYTES NFR BLD: 27.2 % (ref 22–41)
MCH RBC QN AUTO: 32.4 PG (ref 27–33)
MCHC RBC AUTO-ENTMCNC: 36.6 G/DL (ref 33.7–35.3)
MCV RBC AUTO: 88.5 FL (ref 81.4–97.8)
MONOCYTES # BLD AUTO: 1.62 K/UL (ref 0–0.85)
MONOCYTES NFR BLD AUTO: 11.1 % (ref 0–13.4)
NEUTROPHILS # BLD AUTO: 8.93 K/UL (ref 1.82–7.42)
NEUTROPHILS NFR BLD: 61 % (ref 44–72)
NRBC # BLD AUTO: 0 K/UL
NRBC BLD-RTO: 0 /100 WBC
PLATELET # BLD AUTO: 340 K/UL (ref 164–446)
PMV BLD AUTO: 9.7 FL (ref 9–12.9)
POTASSIUM SERPL-SCNC: 4.1 MMOL/L (ref 3.6–5.5)
PROT SERPL-MCNC: 7.8 G/DL (ref 6–8.2)
RBC # BLD AUTO: 5.31 M/UL (ref 4.7–6.1)
SODIUM SERPL-SCNC: 137 MMOL/L (ref 135–145)
WBC # BLD AUTO: 14.6 K/UL (ref 4.8–10.8)

## 2021-04-01 PROCEDURE — 96374 THER/PROPH/DIAG INJ IV PUSH: CPT

## 2021-04-01 PROCEDURE — 80053 COMPREHEN METABOLIC PANEL: CPT

## 2021-04-01 PROCEDURE — 36415 COLL VENOUS BLD VENIPUNCTURE: CPT

## 2021-04-01 PROCEDURE — 85025 COMPLETE CBC W/AUTO DIFF WBC: CPT

## 2021-04-01 PROCEDURE — 93005 ELECTROCARDIOGRAM TRACING: CPT | Performed by: EMERGENCY MEDICINE

## 2021-04-01 PROCEDURE — 700111 HCHG RX REV CODE 636 W/ 250 OVERRIDE (IP): Performed by: EMERGENCY MEDICINE

## 2021-04-01 PROCEDURE — A9270 NON-COVERED ITEM OR SERVICE: HCPCS | Performed by: EMERGENCY MEDICINE

## 2021-04-01 PROCEDURE — 700102 HCHG RX REV CODE 250 W/ 637 OVERRIDE(OP): Performed by: EMERGENCY MEDICINE

## 2021-04-01 PROCEDURE — 99285 EMERGENCY DEPT VISIT HI MDM: CPT

## 2021-04-01 PROCEDURE — 700105 HCHG RX REV CODE 258: Performed by: EMERGENCY MEDICINE

## 2021-04-01 RX ORDER — LORAZEPAM 1 MG/1
1 TABLET ORAL ONCE
Status: COMPLETED | OUTPATIENT
Start: 2021-04-01 | End: 2021-04-01

## 2021-04-01 RX ORDER — LORAZEPAM 2 MG/ML
2 INJECTION INTRAMUSCULAR ONCE
Status: COMPLETED | OUTPATIENT
Start: 2021-04-01 | End: 2021-04-01

## 2021-04-01 RX ORDER — SODIUM CHLORIDE 9 MG/ML
1000 INJECTION, SOLUTION INTRAVENOUS ONCE
Status: COMPLETED | OUTPATIENT
Start: 2021-04-01 | End: 2021-04-01

## 2021-04-01 RX ADMIN — LORAZEPAM 2 MG: 2 INJECTION INTRAMUSCULAR; INTRAVENOUS at 15:57

## 2021-04-01 RX ADMIN — SODIUM CHLORIDE 1000 ML: 9 INJECTION, SOLUTION INTRAVENOUS at 15:58

## 2021-04-01 RX ADMIN — LORAZEPAM 1 MG: 1 TABLET ORAL at 14:58

## 2021-04-01 ASSESSMENT — LIFESTYLE VARIABLES: DO YOU DRINK ALCOHOL: NO

## 2021-04-01 NOTE — ED NOTES
Pt placed on cardiac monitor. Observer having carpal spasms and tachypnea. ERP notified and orders received.

## 2021-04-01 NOTE — ED TRIAGE NOTES
Pt bib ems from scene.  Chief Complaint   Patient presents with   • Drug Abuse     Meth, marijuana     Pt states he just doesn't feel well after meth use this am. Pt anxious and fidgeting. Pt reports intermittent visual hallucinations. States he just wants to lay down and take a nap.

## 2021-04-02 VITALS
SYSTOLIC BLOOD PRESSURE: 128 MMHG | TEMPERATURE: 98.4 F | HEART RATE: 105 BPM | OXYGEN SATURATION: 98 % | DIASTOLIC BLOOD PRESSURE: 84 MMHG | RESPIRATION RATE: 20 BRPM

## 2021-04-02 NOTE — ED NOTES
Discharge education provided. Discharge paperwork signed and given to pt. All questions answered. All belongings with pt. Pt given cab voucher, ambulated independently to lobby.

## 2021-04-02 NOTE — ED NOTES
Pt standing at end of bed unsteady unable to ambulate. Pt has been incontinent of urine, linens changed. Pt assisted back to bed.

## 2021-04-02 NOTE — DISCHARGE PLANNING
SW attempted to locate Pt family and was able to find possible family contact, however no answer at this late hour.     Laurie Stoll [phone] (Possible Mother)  Bertha Gallo [phone] (Possible Grandmother)

## 2021-04-02 NOTE — ED PROVIDER NOTES
ED Provider Note    CHIEF COMPLAINT  Chief Complaint   Patient presents with   • Drug Abuse     Meth, marijuana       HPI  Thomas James Dancer V is a 20 y.o. male who presents tachycardic, anxious, stating he wanted somewhere to rest.  No chest pain, no cough.  He denies fever.  Patient also admits to smoking marijuana in addition to smoking meth.  Patient denies dizziness.  Upon arrival to room he is tachycardic, anxious, sweats.  No suicidal or homicidal ideation.  He denies hallucination.  Onset of symptoms today after using the drugs this morning.  Patient admits to anxiety.    REVIEW OF SYSTEMS    Constitutional: General malaise, denies fever  Respiratory: No shortness of breath  Cardiac: No chest pain  Gastrointestinal: Abdominal pain or vomiting  Musculoskeletal: Nuys neck or back pain  Neurologic: No headache  Psychiatric: Denies suicidal homicidal ideation.  States feels anxious          PAST MEDICAL HISTORY  No past medical history on file.    FAMILY HISTORY  No family history on file.    SOCIAL HISTORY  Social History     Socioeconomic History   • Marital status: Single     Spouse name: Not on file   • Number of children: Not on file   • Years of education: Not on file   • Highest education level: Not on file   Occupational History   • Not on file   Tobacco Use   • Smoking status: Current Every Day Smoker     Packs/day: 0.50     Years: 2.00     Pack years: 1.00     Types: Cigarettes   • Smokeless tobacco: Never Used   Substance and Sexual Activity   • Alcohol use: Yes   • Drug use: Yes     Comment: meth   • Sexual activity: Not on file   Other Topics Concern   • Behavioral problems Not Asked   • Interpersonal relationships Not Asked   • Sad or not enjoying activities Not Asked   • Suicidal thoughts Not Asked   • Poor school performance Not Asked   • Reading difficulties Not Asked   • Speech difficulties Not Asked   • Writing difficulties Not Asked   • Inadequate sleep Not Asked   • Excessive TV viewing  Not Asked   • Excessive video game use Not Asked   • Inadequate exercise Not Asked   • Sports related Not Asked   • Poor diet Not Asked   • Family concerns for drug/alcohol abuse Not Asked   • Poor oral hygiene Not Asked   • Bike safety Not Asked   • Family concerns vehicle safety Not Asked   Social History Narrative   • Not on file     Social Determinants of Health     Financial Resource Strain:    • Difficulty of Paying Living Expenses:    Food Insecurity:    • Worried About Running Out of Food in the Last Year:    • Ran Out of Food in the Last Year:    Transportation Needs:    • Lack of Transportation (Medical):    • Lack of Transportation (Non-Medical):    Physical Activity:    • Days of Exercise per Week:    • Minutes of Exercise per Session:    Stress:    • Feeling of Stress :    Social Connections:    • Frequency of Communication with Friends and Family:    • Frequency of Social Gatherings with Friends and Family:    • Attends Voodoo Services:    • Active Member of Clubs or Organizations:    • Attends Club or Organization Meetings:    • Marital Status:    Intimate Partner Violence:    • Fear of Current or Ex-Partner:    • Emotionally Abused:    • Physically Abused:    • Sexually Abused:        SURGICAL HISTORY  No past surgical history on file.    CURRENT MEDICATIONS  Home Medications     Reviewed by Asad Booker R.N. (Registered Nurse) on 04/01/21 at 1412  Med List Status: <None>   Medication Last Dose Status        Patient Reji Taking any Medications                       ALLERGIES  Allergies   Allergen Reactions   • Bee        PHYSICAL EXAM  VITAL SIGNS: /87   Pulse (!) 120   Temp 36.9 °C (98.4 °F) (Temporal)   Resp (!) 22   SpO2 93%   Constitutional: Moderate distress  HENT: No facial swelling  Eyes: Anicteric, no conjunctivitis.     Cardiovascular: Tachycardic heart rate, Normal rhythm  Pulmonary:  No wheezing, No rales.   Gastrointestinal: Soft, No tenderness, No masses  Skin: Warm,  moist, No cyanosis.  No cellulitis, no asymmetric edema  Neurologic: Speech is clear, follows commands, facial expression is symmetrical.   strength and leg strength equal, sensation intact  Psychiatric: Anxious, cooperative  Musculoskeletal: No chest wall retractions, no neck tenderness    EKG/Labs  Results for orders placed or performed during the hospital encounter of 04/01/21   CBC WITH DIFFERENTIAL   Result Value Ref Range    WBC 14.6 (H) 4.8 - 10.8 K/uL    RBC 5.31 4.70 - 6.10 M/uL    Hemoglobin 17.2 14.0 - 18.0 g/dL    Hematocrit 47.0 42.0 - 52.0 %    MCV 88.5 81.4 - 97.8 fL    MCH 32.4 27.0 - 33.0 pg    MCHC 36.6 (H) 33.7 - 35.3 g/dL    RDW 38.5 35.9 - 50.0 fL    Platelet Count 340 164 - 446 K/uL    MPV 9.7 9.0 - 12.9 fL    Neutrophils-Polys 61.00 44.00 - 72.00 %    Lymphocytes 27.20 22.00 - 41.00 %    Monocytes 11.10 0.00 - 13.40 %    Eosinophils 0.10 0.00 - 6.90 %    Basophils 0.30 0.00 - 1.80 %    Immature Granulocytes 0.30 0.00 - 0.90 %    Nucleated RBC 0.00 /100 WBC    Neutrophils (Absolute) 8.93 (H) 1.82 - 7.42 K/uL    Lymphs (Absolute) 3.98 1.00 - 4.80 K/uL    Monos (Absolute) 1.62 (H) 0.00 - 0.85 K/uL    Eos (Absolute) 0.01 0.00 - 0.51 K/uL    Baso (Absolute) 0.04 0.00 - 0.12 K/uL    Immature Granulocytes (abs) 0.05 0.00 - 0.11 K/uL    NRBC (Absolute) 0.00 K/uL   COMP METABOLIC PANEL   Result Value Ref Range    Sodium 137 135 - 145 mmol/L    Potassium 4.1 3.6 - 5.5 mmol/L    Chloride 99 96 - 112 mmol/L    Co2 17 (L) 20 - 33 mmol/L    Anion Gap 21.0 (H) 7.0 - 16.0    Glucose 90 65 - 99 mg/dL    Bun 17 8 - 22 mg/dL    Creatinine 1.00 0.50 - 1.40 mg/dL    Calcium 10.1 8.5 - 10.5 mg/dL    AST(SGOT) 82 (H) 12 - 45 U/L    ALT(SGPT) 31 2 - 50 U/L    Alkaline Phosphatase 66 30 - 99 U/L    Total Bilirubin 2.6 (H) 0.1 - 1.5 mg/dL    Albumin 4.8 3.2 - 4.9 g/dL    Total Protein 7.8 6.0 - 8.2 g/dL    Globulin 3.0 1.9 - 3.5 g/dL    A-G Ratio 1.6 g/dL   ESTIMATED GFR   Result Value Ref Range    GFR If   American >60 >60 mL/min/1.73 m 2    GFR If Non African American >60 >60 mL/min/1.73 m 2   EKG (NOW)   Result Value Ref Range    Report       [facility] Emergency Dept.    Test Date:  2021  Pt Name:    THOMAS DANCER                Department: ER  MRN:        [id number]                      Room:       [facility]  Gender:     Male                         Technician: [id number]  :        2000                   Requested By:PARMINDER MCKEE  Order #:    [id number]                    Reading MD: PARMINDER MCKEE MD    Measurements  Intervals                                Axis  Rate:       116                          P:          70  OH:         140                          QRS:        88  QRSD:       88                           T:          60  QT:         336  QTc:        467    Interpretive Statements  SINUS TACHYCARDIA  Compared to ECG 2020 09:53:07  Q waves no longer present  T-wave abnormality no longer present  No acute ischemia  Electronically Signed On 2021 17:19:06 PDT by PARMINDER MCKEE MD             COURSE & MEDICAL DECISION MAKING  Pertinent Labs & Imaging studies reviewed. (See chart for details)  Patient tachycardic likely secondary to methamphetamine toxidrome.  Observed here in the emergency department, laboratory evaluation obtained shows nonspecific leukocytosis.  He remains afebrile.  I believe his tachycardia is secondary to methamphetamine use, I do not believe this patient is septic.  Heart rate has trended downward, most recently 98.  Patient appears much more calm.  He was treated with Ativan twice.  When he is more alert, he will be discharged.  He is refusing referral to detox at this time.    FINAL IMPRESSION     1. Methamphetamine abuse (HCC)     2. Tachycardia                     Electronically signed by: Parminder Mckee M.D., 2021 5:18 PM

## 2021-04-02 NOTE — ED NOTES
Pt able to follow commands, walks independently with a steady gait, answers questions appropriately. VSS.

## 2021-04-21 ENCOUNTER — HOSPITAL ENCOUNTER (EMERGENCY)
Facility: MEDICAL CENTER | Age: 21
End: 2021-04-21
Payer: COMMERCIAL

## 2021-04-21 VITALS
HEART RATE: 130 BPM | BODY MASS INDEX: 19.46 KG/M2 | WEIGHT: 139.55 LBS | RESPIRATION RATE: 18 BRPM | OXYGEN SATURATION: 95 % | DIASTOLIC BLOOD PRESSURE: 73 MMHG | TEMPERATURE: 98.9 F | SYSTOLIC BLOOD PRESSURE: 108 MMHG

## 2021-04-21 PROCEDURE — 302449 STATCHG TRIAGE ONLY (STATISTIC)

## 2021-04-21 ASSESSMENT — FIBROSIS 4 INDEX: FIB4 SCORE: 0.87

## 2021-04-21 NOTE — ED TRIAGE NOTES
"Pt comes in reporting drug abuse. Pt rambling in triage. \"I just need water, I need to lay down\"   "

## 2021-04-21 NOTE — ED NOTES
Pt leaving AMA at this time. Pt understands risks and consequences including up to death. Pt advised to call 911 or return to er for further.   Pt understand and signed AMA.

## 2022-02-19 ENCOUNTER — HOSPITAL ENCOUNTER (EMERGENCY)
Facility: MEDICAL CENTER | Age: 22
End: 2022-02-19
Attending: EMERGENCY MEDICINE
Payer: COMMERCIAL

## 2022-02-19 VITALS
OXYGEN SATURATION: 100 % | BODY MASS INDEX: 19.46 KG/M2 | HEIGHT: 71 IN | TEMPERATURE: 98.5 F | SYSTOLIC BLOOD PRESSURE: 147 MMHG | RESPIRATION RATE: 22 BRPM | HEART RATE: 138 BPM | DIASTOLIC BLOOD PRESSURE: 96 MMHG

## 2022-02-19 DIAGNOSIS — F15.10 METHAMPHETAMINE ABUSE (HCC): ICD-10-CM

## 2022-02-19 PROCEDURE — A9270 NON-COVERED ITEM OR SERVICE: HCPCS | Performed by: EMERGENCY MEDICINE

## 2022-02-19 PROCEDURE — 700102 HCHG RX REV CODE 250 W/ 637 OVERRIDE(OP): Performed by: EMERGENCY MEDICINE

## 2022-02-19 PROCEDURE — 99284 EMERGENCY DEPT VISIT MOD MDM: CPT

## 2022-02-19 RX ORDER — HALOPERIDOL 5 MG/1
5 TABLET ORAL ONCE
Status: COMPLETED | OUTPATIENT
Start: 2022-02-19 | End: 2022-02-19

## 2022-02-19 RX ORDER — DIPHENHYDRAMINE HCL 25 MG
25 TABLET ORAL ONCE
Status: COMPLETED | OUTPATIENT
Start: 2022-02-19 | End: 2022-02-19

## 2022-02-19 RX ADMIN — HALOPERIDOL 5 MG: 5 TABLET ORAL at 13:50

## 2022-02-19 RX ADMIN — DIPHENHYDRAMINE HYDROCHLORIDE 25 MG: 25 TABLET ORAL at 13:50

## 2022-02-19 NOTE — ED TRIAGE NOTES
"Parminder Walter Dancer V  21 y.o.  male  Chief Complaint   Patient presents with   • Drug Abuse     Pt TALIB AZEVEDO, with this RN admits to doing meth \"earlier\", unable to specify what time. Asking for something to help him relax. Denies SI/HI, when asked if he is having hallucinations he statse \"a bit\". Pt cooperative, but having ++ motor agitation, writhing in wheelchair, unable to answer many questions. Denies any chest pain or shortness of breath.       Pt regular & tachy. Patient educated on triage process, to alert staff if any changes in condition.    "

## 2022-02-19 NOTE — ED NOTES
Pt placed in green 34, pt very anxious, will not speak to this RN. Pt pacing room and has to be reminded over and over to go back to his room.   Not able to get a current BP due to pt's movements

## 2022-02-19 NOTE — ED NOTES
Pt medicated per mar order, 2nd RN at bedside assisted in administering pt's medications.  No current vitals taken for this pt, uncooperative. erp aware, pt escorted to the lobby by this RN. No d/c paper work signed/pt has no evidence of learning. erp okay to discharge this pt.

## 2022-02-19 NOTE — DISCHARGE INSTRUCTIONS
You feel bad because you are using meth. If you stop using, you will feel better. If you need help stopping, use the clinics listed here.
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
## 2022-02-19 NOTE — ED PROVIDER NOTES
"ED Provider Note    Scribed for Arthur Garcia M.D. by Arthur Garcia M.D.. 2/19/2022,  1:12 PM.    CHIEF COMPLAINT  Chief Complaint   Patient presents with   • Drug Abuse     Pt TALIB AZEVEDO, with this RN admits to doing meth \"earlier\", unable to specify what time. Asking for something to help him relax. Denies SI/HI, when asked if he is having hallucinations he statse \"a bit\". Pt cooperative, but having ++ motor agitation, writhing in wheelchair, unable to answer many questions. Denies any chest pain or shortness of breath.       HPI  Thomas James Dancer V is a 21 y.o. male who presents to the Emergency Department brought in by EMS, because of agitation and malaise after doing meth today. He cannot or will not say what time. At triage, he was asking for something to help him relax. He denies suicidal or homicidal thoughts. He is cooperative, but show significant motor agitation, distracted, not answering questions well, but answering questions appropriately when carefully redirected. He does not complain of any fevers or chills or nausea or vomiting. He denies chest pain or shortness of breath.     This patient has been seen repeatedly for problems both explicitly and indirectly related to drug abuse, dating back to August 2020.    REVIEW OF SYSTEMS  See HPI for further details. All other systems are negative.     PAST MEDICAL HISTORY       SOCIAL HISTORY  Social History     Tobacco Use   • Smoking status: Current Every Day Smoker     Packs/day: 0.50     Years: 2.00     Pack years: 1.00     Types: Cigarettes   • Smokeless tobacco: Never Used   Vaping Use   • Vaping Use: Never used   Substance and Sexual Activity   • Alcohol use: Yes   • Drug use: Yes     Comment: meth   • Sexual activity: Not on file     Social History     Substance and Sexual Activity   Drug Use Yes    Comment: meth       SURGICAL HISTORY  patient denies any surgical history    CURRENT MEDICATIONS  Home Medications    **Home medications have " "not yet been reviewed for this encounter**         ALLERGIES  Allergies   Allergen Reactions   • Bee        PHYSICAL EXAM  VITAL SIGNS: /96   Pulse (!) 138   Temp 36.9 °C (98.5 °F) (Temporal)   Resp 20   Ht 1.803 m (5' 11\")   SpO2 100%   BMI 19.46 kg/m²   Pulse ox interpretation: I interpret this pulse ox as normal.  Constitutional: Alert in no apparent distress. Physically agitated.  HENT: No signs of trauma, Bilateral external ears normal, Nose normal.   Eyes: Pupils are equal and reactive, Conjunctiva normal, Non-icteric.   Neck: Normal range of motion, Supple, No stridor.    Cardiovascular: Normal peripheral perfusion  Thorax & Lungs: Unlabored respirations, equal chest expansion, no accessory muscle use  Abdomen: Non-distended  Skin:  No erythema, No rash.   Back: Normal alignment and ROM  Extremities: No gross deformity  Musculoskeletal: Good range of motion in all major joints.   Neurologic: Alert, physical agitation, normal motor function, No focal deficits noted.   Psychiatric: Affect normal, Judgment normal, Mood normal.    DIAGNOSTIC STUDIES / PROCEDURES      LABS  Labs Reviewed - No data to display  All labs reviewed by me.    RADIOLOGY  No orders to display     The radiologist's interpretation of all radiological studies have been reviewed by me.    COURSE & MEDICAL DECISION MAKING  Nursing notes, VS, PMSFHx reviewed in chart.     1:12 PM Patient seen and examined at bedside. He is presenting with uncomplicated methamphetamine intoxication, in the setting of a fairly long history of substance abuse. He does not meet criteria for legal hold. He is interested in oral medications, so I have ordered Haldol and Benadryl orally. He is encouraged to stop using methamphetamines, and we discussed plainly that drug abuse is the reason that he feels poorly, and has presented for the same problem on several previous occasions. It is not clear that he agrees with my explanation.     The patient will " return for new or worsening symptoms and is stable at the time of discharge.    The patient is referred to a primary physician for blood pressure management, diabetic screening, and for all other preventative health concerns.    DISPOSITION:  Patient will be discharged home in stable condition.    FOLLOW UP:  [facility]  [address] [place] [id number] [phone]        [facility]  [address] [place] [id number] [phone]          OUTPATIENT MEDICATIONS:  New Prescriptions    No medications on file         FINAL IMPRESSION  1. Methamphetamine abuse (HCC)

## 2022-04-02 ENCOUNTER — APPOINTMENT (OUTPATIENT)
Dept: RADIOLOGY | Facility: MEDICAL CENTER | Age: 22
DRG: 917 | End: 2022-04-02
Attending: INTERNAL MEDICINE
Payer: COMMERCIAL

## 2022-04-02 ENCOUNTER — HOSPITAL ENCOUNTER (INPATIENT)
Facility: MEDICAL CENTER | Age: 22
LOS: 2 days | DRG: 917 | End: 2022-04-04
Attending: EMERGENCY MEDICINE | Admitting: INTERNAL MEDICINE
Payer: COMMERCIAL

## 2022-04-02 DIAGNOSIS — F29 PSYCHOSIS, UNSPECIFIED PSYCHOSIS TYPE (HCC): ICD-10-CM

## 2022-04-02 DIAGNOSIS — I21.4 NSTEMI (NON-ST ELEVATED MYOCARDIAL INFARCTION) (HCC): Primary | ICD-10-CM

## 2022-04-02 DIAGNOSIS — D72.829 LEUKOCYTOSIS, UNSPECIFIED TYPE: ICD-10-CM

## 2022-04-02 DIAGNOSIS — R46.89 AGGRESSIVE BEHAVIOR: ICD-10-CM

## 2022-04-02 DIAGNOSIS — M62.82 NON-TRAUMATIC RHABDOMYOLYSIS: ICD-10-CM

## 2022-04-02 DIAGNOSIS — N17.9 AKI (ACUTE KIDNEY INJURY) (HCC): ICD-10-CM

## 2022-04-02 DIAGNOSIS — F19.10 POLYSUBSTANCE ABUSE (HCC): ICD-10-CM

## 2022-04-02 PROBLEM — I24.9 ACS (ACUTE CORONARY SYNDROME) (HCC): Status: ACTIVE | Noted: 2022-04-02

## 2022-04-02 PROBLEM — E87.0 HYPERNATREMIA: Status: ACTIVE | Noted: 2022-04-02

## 2022-04-02 PROBLEM — R65.10 SIRS (SYSTEMIC INFLAMMATORY RESPONSE SYNDROME) (HCC): Status: ACTIVE | Noted: 2022-04-02

## 2022-04-02 PROBLEM — F19.959 PSYCHOACTIVE SUBSTANCE-INDUCED PSYCHOSIS (HCC): Status: ACTIVE | Noted: 2022-04-02

## 2022-04-02 LAB
ALBUMIN SERPL BCP-MCNC: 5.2 G/DL (ref 3.2–4.9)
ALBUMIN SERPL BCP-MCNC: 5.3 G/DL (ref 3.2–4.9)
ALBUMIN/GLOB SERPL: 2.1 G/DL
ALBUMIN/GLOB SERPL: 2.1 G/DL
ALP SERPL-CCNC: 68 U/L (ref 30–99)
ALP SERPL-CCNC: 69 U/L (ref 30–99)
ALT SERPL-CCNC: 25 U/L (ref 2–50)
ALT SERPL-CCNC: 28 U/L (ref 2–50)
AMORPH CRY #/AREA URNS HPF: PRESENT /HPF
ANION GAP SERPL CALC-SCNC: 20 MMOL/L (ref 7–16)
ANION GAP SERPL CALC-SCNC: 22 MMOL/L (ref 7–16)
APPEARANCE UR: CLEAR
AST SERPL-CCNC: 120 U/L (ref 12–45)
AST SERPL-CCNC: 157 U/L (ref 12–45)
BACTERIA #/AREA URNS HPF: NEGATIVE /HPF
BASOPHILS # BLD AUTO: 0.2 % (ref 0–1.8)
BASOPHILS # BLD: 0.04 K/UL (ref 0–0.12)
BILIRUB SERPL-MCNC: 0.9 MG/DL (ref 0.1–1.5)
BILIRUB SERPL-MCNC: 1 MG/DL (ref 0.1–1.5)
BILIRUB UR QL STRIP.AUTO: ABNORMAL
BUN SERPL-MCNC: 20 MG/DL (ref 8–22)
BUN SERPL-MCNC: 21 MG/DL (ref 8–22)
CALCIUM SERPL-MCNC: 9.7 MG/DL (ref 8.5–10.5)
CALCIUM SERPL-MCNC: 9.8 MG/DL (ref 8.5–10.5)
CHLORIDE SERPL-SCNC: 108 MMOL/L (ref 96–112)
CHLORIDE SERPL-SCNC: 109 MMOL/L (ref 96–112)
CK SERPL-CCNC: ABNORMAL U/L (ref 0–154)
CO2 SERPL-SCNC: 18 MMOL/L (ref 20–33)
CO2 SERPL-SCNC: 19 MMOL/L (ref 20–33)
COLOR UR: YELLOW
CREAT SERPL-MCNC: 2.06 MG/DL (ref 0.5–1.4)
CREAT SERPL-MCNC: 2.22 MG/DL (ref 0.5–1.4)
CREAT UR-MCNC: 353.22 MG/DL
EKG IMPRESSION: NORMAL
EKG IMPRESSION: NORMAL
EOSINOPHIL # BLD AUTO: 0 K/UL (ref 0–0.51)
EOSINOPHIL NFR BLD: 0 % (ref 0–6.9)
EPI CELLS #/AREA URNS HPF: NEGATIVE /HPF
ERYTHROCYTE [DISTWIDTH] IN BLOOD BY AUTOMATED COUNT: 42.5 FL (ref 35.9–50)
ETHANOL BLD-MCNC: <10.1 MG/DL (ref 0–10)
GFR SERPLBLD CREATININE-BSD FMLA CKD-EPI: 42 ML/MIN/1.73 M 2
GFR SERPLBLD CREATININE-BSD FMLA CKD-EPI: 46 ML/MIN/1.73 M 2
GLOBULIN SER CALC-MCNC: 2.5 G/DL (ref 1.9–3.5)
GLOBULIN SER CALC-MCNC: 2.5 G/DL (ref 1.9–3.5)
GLUCOSE SERPL-MCNC: 74 MG/DL (ref 65–99)
GLUCOSE SERPL-MCNC: 88 MG/DL (ref 65–99)
GLUCOSE UR STRIP.AUTO-MCNC: NEGATIVE MG/DL
GRAN CASTS #/AREA URNS LPF: ABNORMAL /LPF
HCT VFR BLD AUTO: 53.1 % (ref 42–52)
HGB BLD-MCNC: 18.9 G/DL (ref 14–18)
HYALINE CASTS #/AREA URNS LPF: ABNORMAL /LPF
IMM GRANULOCYTES # BLD AUTO: 0.15 K/UL (ref 0–0.11)
IMM GRANULOCYTES NFR BLD AUTO: 0.8 % (ref 0–0.9)
KETONES UR STRIP.AUTO-MCNC: ABNORMAL MG/DL
LEUKOCYTE ESTERASE UR QL STRIP.AUTO: NEGATIVE
LYMPHOCYTES # BLD AUTO: 1.27 K/UL (ref 1–4.8)
LYMPHOCYTES NFR BLD: 6.6 % (ref 22–41)
MAGNESIUM SERPL-MCNC: 2.1 MG/DL (ref 1.5–2.5)
MCH RBC QN AUTO: 31.8 PG (ref 27–33)
MCHC RBC AUTO-ENTMCNC: 35.6 G/DL (ref 33.7–35.3)
MCV RBC AUTO: 89.2 FL (ref 81.4–97.8)
MICRO URNS: ABNORMAL
MONOCYTES # BLD AUTO: 1.96 K/UL (ref 0–0.85)
MONOCYTES NFR BLD AUTO: 10.3 % (ref 0–13.4)
NEUTROPHILS # BLD AUTO: 15.69 K/UL (ref 1.82–7.42)
NEUTROPHILS NFR BLD: 82.1 % (ref 44–72)
NITRITE UR QL STRIP.AUTO: NEGATIVE
NRBC # BLD AUTO: 0 K/UL
NRBC BLD-RTO: 0 /100 WBC
NT-PROBNP SERPL IA-MCNC: 471 PG/ML (ref 0–125)
PH UR STRIP.AUTO: 5 [PH] (ref 5–8)
PHOSPHATE SERPL-MCNC: 4.4 MG/DL (ref 2.5–4.5)
PLATELET # BLD AUTO: 317 K/UL (ref 164–446)
PMV BLD AUTO: 9.7 FL (ref 9–12.9)
POTASSIUM SERPL-SCNC: 3.7 MMOL/L (ref 3.6–5.5)
POTASSIUM SERPL-SCNC: 4 MMOL/L (ref 3.6–5.5)
PROT SERPL-MCNC: 7.7 G/DL (ref 6–8.2)
PROT SERPL-MCNC: 7.8 G/DL (ref 6–8.2)
PROT UR QL STRIP: 100 MG/DL
RBC # BLD AUTO: 5.95 M/UL (ref 4.7–6.1)
RBC # URNS HPF: ABNORMAL /HPF
RBC UR QL AUTO: ABNORMAL
SODIUM SERPL-SCNC: 148 MMOL/L (ref 135–145)
SODIUM SERPL-SCNC: 148 MMOL/L (ref 135–145)
SODIUM UR-SCNC: 142 MMOL/L
SP GR UR STRIP.AUTO: >=1.03
SPERM #/AREA URNS HPF: ABNORMAL /HPF
TROPONIN T SERPL-MCNC: 330 NG/L (ref 6–19)
TROPONIN T SERPL-MCNC: 403 NG/L (ref 6–19)
UROBILINOGEN UR STRIP.AUTO-MCNC: 0.2 MG/DL
WBC # BLD AUTO: 19.1 K/UL (ref 4.8–10.8)
WBC #/AREA URNS HPF: ABNORMAL /HPF

## 2022-04-02 PROCEDURE — 83880 ASSAY OF NATRIURETIC PEPTIDE: CPT

## 2022-04-02 PROCEDURE — 82570 ASSAY OF URINE CREATININE: CPT

## 2022-04-02 PROCEDURE — 36415 COLL VENOUS BLD VENIPUNCTURE: CPT

## 2022-04-02 PROCEDURE — 700105 HCHG RX REV CODE 258: Performed by: EMERGENCY MEDICINE

## 2022-04-02 PROCEDURE — 84100 ASSAY OF PHOSPHORUS: CPT

## 2022-04-02 PROCEDURE — 99285 EMERGENCY DEPT VISIT HI MDM: CPT

## 2022-04-02 PROCEDURE — 83735 ASSAY OF MAGNESIUM: CPT

## 2022-04-02 PROCEDURE — 93005 ELECTROCARDIOGRAM TRACING: CPT | Performed by: EMERGENCY MEDICINE

## 2022-04-02 PROCEDURE — 99223 1ST HOSP IP/OBS HIGH 75: CPT | Performed by: INTERNAL MEDICINE

## 2022-04-02 PROCEDURE — 700102 HCHG RX REV CODE 250 W/ 637 OVERRIDE(OP): Performed by: INTERNAL MEDICINE

## 2022-04-02 PROCEDURE — 82550 ASSAY OF CK (CPK): CPT

## 2022-04-02 PROCEDURE — 80053 COMPREHEN METABOLIC PANEL: CPT

## 2022-04-02 PROCEDURE — 94760 N-INVAS EAR/PLS OXIMETRY 1: CPT

## 2022-04-02 PROCEDURE — 85025 COMPLETE CBC W/AUTO DIFF WBC: CPT

## 2022-04-02 PROCEDURE — A9270 NON-COVERED ITEM OR SERVICE: HCPCS | Performed by: INTERNAL MEDICINE

## 2022-04-02 PROCEDURE — HZ2ZZZZ DETOXIFICATION SERVICES FOR SUBSTANCE ABUSE TREATMENT: ICD-10-PCS | Performed by: EMERGENCY MEDICINE

## 2022-04-02 PROCEDURE — 700111 HCHG RX REV CODE 636 W/ 250 OVERRIDE (IP): Performed by: EMERGENCY MEDICINE

## 2022-04-02 PROCEDURE — 81001 URINALYSIS AUTO W/SCOPE: CPT

## 2022-04-02 PROCEDURE — 84300 ASSAY OF URINE SODIUM: CPT

## 2022-04-02 PROCEDURE — 96372 THER/PROPH/DIAG INJ SC/IM: CPT

## 2022-04-02 PROCEDURE — 84484 ASSAY OF TROPONIN QUANT: CPT

## 2022-04-02 PROCEDURE — 82077 ASSAY SPEC XCP UR&BREATH IA: CPT

## 2022-04-02 PROCEDURE — 770020 HCHG ROOM/CARE - TELE (206)

## 2022-04-02 PROCEDURE — 80307 DRUG TEST PRSMV CHEM ANLYZR: CPT

## 2022-04-02 RX ORDER — CLONIDINE HYDROCHLORIDE 0.1 MG/1
0.1 TABLET ORAL TWICE DAILY
Status: DISCONTINUED | OUTPATIENT
Start: 2022-04-02 | End: 2022-04-02

## 2022-04-02 RX ORDER — DEXTROSE AND SODIUM CHLORIDE 5; .45 G/100ML; G/100ML
INJECTION, SOLUTION INTRAVENOUS CONTINUOUS
Status: DISCONTINUED | OUTPATIENT
Start: 2022-04-02 | End: 2022-04-04 | Stop reason: HOSPADM

## 2022-04-02 RX ORDER — CLONIDINE 0.1 MG/24H
1 PATCH, EXTENDED RELEASE TRANSDERMAL
Status: DISCONTINUED | OUTPATIENT
Start: 2022-04-02 | End: 2022-04-04 | Stop reason: HOSPADM

## 2022-04-02 RX ORDER — DILTIAZEM HYDROCHLORIDE 5 MG/ML
5 INJECTION INTRAVENOUS EVERY 4 HOURS PRN
Status: DISCONTINUED | OUTPATIENT
Start: 2022-04-02 | End: 2022-04-04 | Stop reason: HOSPADM

## 2022-04-02 RX ORDER — PROMETHAZINE HYDROCHLORIDE 25 MG/1
12.5-25 TABLET ORAL EVERY 4 HOURS PRN
Status: DISCONTINUED | OUTPATIENT
Start: 2022-04-02 | End: 2022-04-04 | Stop reason: HOSPADM

## 2022-04-02 RX ORDER — ASPIRIN 325 MG
325 TABLET ORAL DAILY
Status: DISCONTINUED | OUTPATIENT
Start: 2022-04-02 | End: 2022-04-04 | Stop reason: HOSPADM

## 2022-04-02 RX ORDER — ONDANSETRON 4 MG/1
4 TABLET, ORALLY DISINTEGRATING ORAL EVERY 4 HOURS PRN
Status: DISCONTINUED | OUTPATIENT
Start: 2022-04-02 | End: 2022-04-04 | Stop reason: HOSPADM

## 2022-04-02 RX ORDER — HALOPERIDOL 5 MG/ML
5 INJECTION INTRAMUSCULAR EVERY 4 HOURS PRN
Status: DISCONTINUED | OUTPATIENT
Start: 2022-04-02 | End: 2022-04-02

## 2022-04-02 RX ORDER — AMOXICILLIN 250 MG
2 CAPSULE ORAL 2 TIMES DAILY
Status: DISCONTINUED | OUTPATIENT
Start: 2022-04-02 | End: 2022-04-04 | Stop reason: HOSPADM

## 2022-04-02 RX ORDER — NITROGLYCERIN 0.4 MG/1
0.4 TABLET SUBLINGUAL
Status: DISCONTINUED | OUTPATIENT
Start: 2022-04-02 | End: 2022-04-04 | Stop reason: HOSPADM

## 2022-04-02 RX ORDER — LORAZEPAM 2 MG/ML
1 INJECTION INTRAMUSCULAR EVERY 4 HOURS PRN
Status: DISCONTINUED | OUTPATIENT
Start: 2022-04-02 | End: 2022-04-02

## 2022-04-02 RX ORDER — LORAZEPAM 2 MG/ML
2 INJECTION INTRAMUSCULAR ONCE
Status: COMPLETED | OUTPATIENT
Start: 2022-04-02 | End: 2022-04-02

## 2022-04-02 RX ORDER — DIPHENHYDRAMINE HYDROCHLORIDE 50 MG/ML
25 INJECTION INTRAMUSCULAR; INTRAVENOUS ONCE
Status: COMPLETED | OUTPATIENT
Start: 2022-04-02 | End: 2022-04-02

## 2022-04-02 RX ORDER — BISACODYL 10 MG
10 SUPPOSITORY, RECTAL RECTAL
Status: DISCONTINUED | OUTPATIENT
Start: 2022-04-02 | End: 2022-04-04 | Stop reason: HOSPADM

## 2022-04-02 RX ORDER — PROCHLORPERAZINE EDISYLATE 5 MG/ML
5-10 INJECTION INTRAMUSCULAR; INTRAVENOUS EVERY 4 HOURS PRN
Status: DISCONTINUED | OUTPATIENT
Start: 2022-04-02 | End: 2022-04-04 | Stop reason: HOSPADM

## 2022-04-02 RX ORDER — OXYCODONE HYDROCHLORIDE 10 MG/1
10 TABLET ORAL
Status: DISCONTINUED | OUTPATIENT
Start: 2022-04-02 | End: 2022-04-04 | Stop reason: HOSPADM

## 2022-04-02 RX ORDER — ASPIRIN 81 MG/1
324 TABLET, CHEWABLE ORAL DAILY
Status: DISCONTINUED | OUTPATIENT
Start: 2022-04-02 | End: 2022-04-04 | Stop reason: HOSPADM

## 2022-04-02 RX ORDER — ACETAMINOPHEN 325 MG/1
650 TABLET ORAL EVERY 6 HOURS PRN
Status: DISCONTINUED | OUTPATIENT
Start: 2022-04-02 | End: 2022-04-04 | Stop reason: HOSPADM

## 2022-04-02 RX ORDER — MORPHINE SULFATE 4 MG/ML
4 INJECTION INTRAVENOUS
Status: DISCONTINUED | OUTPATIENT
Start: 2022-04-02 | End: 2022-04-04 | Stop reason: HOSPADM

## 2022-04-02 RX ORDER — ONDANSETRON 2 MG/ML
4 INJECTION INTRAMUSCULAR; INTRAVENOUS EVERY 4 HOURS PRN
Status: DISCONTINUED | OUTPATIENT
Start: 2022-04-02 | End: 2022-04-04 | Stop reason: HOSPADM

## 2022-04-02 RX ORDER — PROMETHAZINE HYDROCHLORIDE 25 MG/1
12.5-25 SUPPOSITORY RECTAL EVERY 4 HOURS PRN
Status: DISCONTINUED | OUTPATIENT
Start: 2022-04-02 | End: 2022-04-04 | Stop reason: HOSPADM

## 2022-04-02 RX ORDER — ASPIRIN 300 MG/1
300 SUPPOSITORY RECTAL DAILY
Status: DISCONTINUED | OUTPATIENT
Start: 2022-04-02 | End: 2022-04-04 | Stop reason: HOSPADM

## 2022-04-02 RX ORDER — OXYCODONE HYDROCHLORIDE 5 MG/1
5 TABLET ORAL
Status: DISCONTINUED | OUTPATIENT
Start: 2022-04-02 | End: 2022-04-04 | Stop reason: HOSPADM

## 2022-04-02 RX ORDER — SODIUM CHLORIDE 9 MG/ML
1000 INJECTION, SOLUTION INTRAVENOUS ONCE
Status: COMPLETED | OUTPATIENT
Start: 2022-04-02 | End: 2022-04-03

## 2022-04-02 RX ORDER — POLYETHYLENE GLYCOL 3350 17 G/17G
1 POWDER, FOR SOLUTION ORAL
Status: DISCONTINUED | OUTPATIENT
Start: 2022-04-02 | End: 2022-04-04 | Stop reason: HOSPADM

## 2022-04-02 RX ORDER — LORAZEPAM 2 MG/ML
1 INJECTION INTRAMUSCULAR
Status: DISCONTINUED | OUTPATIENT
Start: 2022-04-02 | End: 2022-04-04 | Stop reason: HOSPADM

## 2022-04-02 RX ORDER — HALOPERIDOL 5 MG/ML
5 INJECTION INTRAMUSCULAR EVERY 4 HOURS PRN
Status: DISCONTINUED | OUTPATIENT
Start: 2022-04-02 | End: 2022-04-04 | Stop reason: HOSPADM

## 2022-04-02 RX ORDER — HALOPERIDOL 5 MG/ML
5 INJECTION INTRAMUSCULAR ONCE
Status: COMPLETED | OUTPATIENT
Start: 2022-04-02 | End: 2022-04-02

## 2022-04-02 RX ORDER — SODIUM CHLORIDE 9 MG/ML
1000 INJECTION, SOLUTION INTRAVENOUS ONCE
Status: COMPLETED | OUTPATIENT
Start: 2022-04-02 | End: 2022-04-02

## 2022-04-02 RX ORDER — HYDRALAZINE HYDROCHLORIDE 20 MG/ML
10 INJECTION INTRAMUSCULAR; INTRAVENOUS EVERY 4 HOURS PRN
Status: DISCONTINUED | OUTPATIENT
Start: 2022-04-02 | End: 2022-04-04 | Stop reason: HOSPADM

## 2022-04-02 RX ADMIN — ASPIRIN 325 MG ORAL TABLET 325 MG: 325 PILL ORAL at 23:46

## 2022-04-02 RX ADMIN — DIPHENHYDRAMINE HYDROCHLORIDE 25 MG: 50 INJECTION INTRAMUSCULAR; INTRAVENOUS at 19:55

## 2022-04-02 RX ADMIN — HALOPERIDOL LACTATE 5 MG: 5 INJECTION, SOLUTION INTRAMUSCULAR at 19:55

## 2022-04-02 RX ADMIN — SODIUM CHLORIDE 1000 ML: 9 INJECTION, SOLUTION INTRAVENOUS at 22:15

## 2022-04-02 RX ADMIN — SENNOSIDES AND DOCUSATE SODIUM 2 TABLET: 50; 8.6 TABLET ORAL at 23:46

## 2022-04-02 RX ADMIN — SODIUM CHLORIDE 1000 ML: 9 INJECTION, SOLUTION INTRAVENOUS at 20:00

## 2022-04-02 RX ADMIN — LORAZEPAM 2 MG: 2 INJECTION INTRAMUSCULAR; INTRAVENOUS at 19:55

## 2022-04-02 ASSESSMENT — FIBROSIS 4 INDEX
FIB4 SCORE: 0.91
FIB4 SCORE: 0.91

## 2022-04-03 ENCOUNTER — APPOINTMENT (OUTPATIENT)
Dept: RADIOLOGY | Facility: MEDICAL CENTER | Age: 22
DRG: 917 | End: 2022-04-03
Attending: INTERNAL MEDICINE
Payer: COMMERCIAL

## 2022-04-03 ENCOUNTER — APPOINTMENT (OUTPATIENT)
Dept: CARDIOLOGY | Facility: MEDICAL CENTER | Age: 22
DRG: 917 | End: 2022-04-03
Attending: INTERNAL MEDICINE
Payer: COMMERCIAL

## 2022-04-03 PROBLEM — I30.0 IDIOPATHIC PERICARDITIS: Status: ACTIVE | Noted: 2022-04-02

## 2022-04-03 PROBLEM — M62.82 RHABDOMYOLYSIS: Status: ACTIVE | Noted: 2022-04-03

## 2022-04-03 LAB
ALBUMIN SERPL BCP-MCNC: 5.1 G/DL (ref 3.2–4.9)
ALBUMIN/GLOB SERPL: 2 G/DL
ALP SERPL-CCNC: 65 U/L (ref 30–99)
ALT SERPL-CCNC: 41 U/L (ref 2–50)
AMPHET UR QL SCN: POSITIVE
ANION GAP SERPL CALC-SCNC: 18 MMOL/L (ref 7–16)
AST SERPL-CCNC: 263 U/L (ref 12–45)
BARBITURATES UR QL SCN: NEGATIVE
BASOPHILS # BLD AUTO: 0.2 % (ref 0–1.8)
BASOPHILS # BLD: 0.02 K/UL (ref 0–0.12)
BENZODIAZ UR QL SCN: POSITIVE
BILIRUB SERPL-MCNC: 0.9 MG/DL (ref 0.1–1.5)
BUN SERPL-MCNC: 19 MG/DL (ref 8–22)
BZE UR QL SCN: NEGATIVE
CALCIUM SERPL-MCNC: 9.3 MG/DL (ref 8.5–10.5)
CANNABINOIDS UR QL SCN: POSITIVE
CHLORIDE SERPL-SCNC: 110 MMOL/L (ref 96–112)
CHOLEST SERPL-MCNC: 142 MG/DL (ref 100–199)
CK SERPL-CCNC: ABNORMAL U/L (ref 0–154)
CO2 SERPL-SCNC: 21 MMOL/L (ref 20–33)
CREAT SERPL-MCNC: 1.77 MG/DL (ref 0.5–1.4)
EOSINOPHIL # BLD AUTO: 0 K/UL (ref 0–0.51)
EOSINOPHIL NFR BLD: 0 % (ref 0–6.9)
ERYTHROCYTE [DISTWIDTH] IN BLOOD BY AUTOMATED COUNT: 44.5 FL (ref 35.9–50)
GFR SERPLBLD CREATININE-BSD FMLA CKD-EPI: 55 ML/MIN/1.73 M 2
GLOBULIN SER CALC-MCNC: 2.5 G/DL (ref 1.9–3.5)
GLUCOSE SERPL-MCNC: 78 MG/DL (ref 65–99)
HCT VFR BLD AUTO: 51.5 % (ref 42–52)
HDLC SERPL-MCNC: 67 MG/DL
HGB BLD-MCNC: 17.4 G/DL (ref 14–18)
IMM GRANULOCYTES # BLD AUTO: 0.06 K/UL (ref 0–0.11)
IMM GRANULOCYTES NFR BLD AUTO: 0.5 % (ref 0–0.9)
LDLC SERPL CALC-MCNC: 62 MG/DL
LV EJECT FRACT  99904: 65
LV EJECT FRACT MOD 2C 99903: 65.85
LV EJECT FRACT MOD 4C 99902: 63.95
LV EJECT FRACT MOD BP 99901: 63.43
LYMPHOCYTES # BLD AUTO: 1.69 K/UL (ref 1–4.8)
LYMPHOCYTES NFR BLD: 14.3 % (ref 22–41)
MAGNESIUM SERPL-MCNC: 2 MG/DL (ref 1.5–2.5)
MCH RBC QN AUTO: 31.4 PG (ref 27–33)
MCHC RBC AUTO-ENTMCNC: 33.8 G/DL (ref 33.7–35.3)
MCV RBC AUTO: 92.8 FL (ref 81.4–97.8)
METHADONE UR QL SCN: NEGATIVE
MONOCYTES # BLD AUTO: 1.19 K/UL (ref 0–0.85)
MONOCYTES NFR BLD AUTO: 10.1 % (ref 0–13.4)
NEUTROPHILS # BLD AUTO: 8.84 K/UL (ref 1.82–7.42)
NEUTROPHILS NFR BLD: 74.9 % (ref 44–72)
NRBC # BLD AUTO: 0 K/UL
NRBC BLD-RTO: 0 /100 WBC
OPIATES UR QL SCN: NEGATIVE
OXYCODONE UR QL SCN: NEGATIVE
PCP UR QL SCN: NEGATIVE
PLATELET # BLD AUTO: 247 K/UL (ref 164–446)
PMV BLD AUTO: 9.7 FL (ref 9–12.9)
POTASSIUM SERPL-SCNC: 4.1 MMOL/L (ref 3.6–5.5)
PROPOXYPH UR QL SCN: NEGATIVE
PROT SERPL-MCNC: 7.6 G/DL (ref 6–8.2)
RBC # BLD AUTO: 5.55 M/UL (ref 4.7–6.1)
SODIUM SERPL-SCNC: 139 MMOL/L (ref 135–145)
SODIUM SERPL-SCNC: 141 MMOL/L (ref 135–145)
SODIUM SERPL-SCNC: 149 MMOL/L (ref 135–145)
TRIGL SERPL-MCNC: 64 MG/DL (ref 0–149)
TROPONIN T SERPL-MCNC: 313 NG/L (ref 6–19)
TROPONIN T SERPL-MCNC: 332 NG/L (ref 6–19)
WBC # BLD AUTO: 11.8 K/UL (ref 4.8–10.8)

## 2022-04-03 PROCEDURE — 99254 IP/OBS CNSLTJ NEW/EST MOD 60: CPT | Performed by: INTERNAL MEDICINE

## 2022-04-03 PROCEDURE — 87040 BLOOD CULTURE FOR BACTERIA: CPT | Mod: 91

## 2022-04-03 PROCEDURE — A9270 NON-COVERED ITEM OR SERVICE: HCPCS | Performed by: INTERNAL MEDICINE

## 2022-04-03 PROCEDURE — 82550 ASSAY OF CK (CPK): CPT

## 2022-04-03 PROCEDURE — 700111 HCHG RX REV CODE 636 W/ 250 OVERRIDE (IP): Performed by: INTERNAL MEDICINE

## 2022-04-03 PROCEDURE — 99233 SBSQ HOSP IP/OBS HIGH 50: CPT | Performed by: STUDENT IN AN ORGANIZED HEALTH CARE EDUCATION/TRAINING PROGRAM

## 2022-04-03 PROCEDURE — 770020 HCHG ROOM/CARE - TELE (206)

## 2022-04-03 PROCEDURE — 84484 ASSAY OF TROPONIN QUANT: CPT

## 2022-04-03 PROCEDURE — 76775 US EXAM ABDO BACK WALL LIM: CPT

## 2022-04-03 PROCEDURE — 84295 ASSAY OF SERUM SODIUM: CPT

## 2022-04-03 PROCEDURE — 93306 TTE W/DOPPLER COMPLETE: CPT | Mod: 26 | Performed by: INTERNAL MEDICINE

## 2022-04-03 PROCEDURE — 700102 HCHG RX REV CODE 250 W/ 637 OVERRIDE(OP): Performed by: INTERNAL MEDICINE

## 2022-04-03 PROCEDURE — 93306 TTE W/DOPPLER COMPLETE: CPT

## 2022-04-03 PROCEDURE — 700105 HCHG RX REV CODE 258: Performed by: STUDENT IN AN ORGANIZED HEALTH CARE EDUCATION/TRAINING PROGRAM

## 2022-04-03 PROCEDURE — 80061 LIPID PANEL: CPT

## 2022-04-03 PROCEDURE — 83735 ASSAY OF MAGNESIUM: CPT

## 2022-04-03 PROCEDURE — 700105 HCHG RX REV CODE 258: Performed by: INTERNAL MEDICINE

## 2022-04-03 PROCEDURE — 71045 X-RAY EXAM CHEST 1 VIEW: CPT

## 2022-04-03 PROCEDURE — 80053 COMPREHEN METABOLIC PANEL: CPT

## 2022-04-03 PROCEDURE — 96372 THER/PROPH/DIAG INJ SC/IM: CPT

## 2022-04-03 PROCEDURE — 85025 COMPLETE CBC W/AUTO DIFF WBC: CPT

## 2022-04-03 RX ORDER — SODIUM CHLORIDE, SODIUM LACTATE, POTASSIUM CHLORIDE, AND CALCIUM CHLORIDE .6; .31; .03; .02 G/100ML; G/100ML; G/100ML; G/100ML
2000 INJECTION, SOLUTION INTRAVENOUS ONCE
Status: COMPLETED | OUTPATIENT
Start: 2022-04-03 | End: 2022-04-03

## 2022-04-03 RX ADMIN — DEXTROSE AND SODIUM CHLORIDE: 5; 450 INJECTION, SOLUTION INTRAVENOUS at 12:09

## 2022-04-03 RX ADMIN — DEXTROSE AND SODIUM CHLORIDE: 5; 450 INJECTION, SOLUTION INTRAVENOUS at 00:52

## 2022-04-03 RX ADMIN — ENOXAPARIN SODIUM 40 MG: 40 INJECTION SUBCUTANEOUS at 05:27

## 2022-04-03 RX ADMIN — SODIUM CHLORIDE, POTASSIUM CHLORIDE, SODIUM LACTATE AND CALCIUM CHLORIDE 2000 ML: 600; 310; 30; 20 INJECTION, SOLUTION INTRAVENOUS at 12:36

## 2022-04-03 RX ADMIN — OXYCODONE 5 MG: 5 TABLET ORAL at 11:21

## 2022-04-03 RX ADMIN — CLONIDINE 1 PATCH: 0.1 PATCH TRANSDERMAL at 00:59

## 2022-04-03 RX ADMIN — OXYCODONE HYDROCHLORIDE 10 MG: 10 TABLET ORAL at 14:38

## 2022-04-03 ASSESSMENT — ENCOUNTER SYMPTOMS
SPUTUM PRODUCTION: 0
VOMITING: 0
COUGH: 0
WEAKNESS: 1
NERVOUS/ANXIOUS: 1
PALPITATIONS: 0
DOUBLE VISION: 0
SINUS PAIN: 0
FOCAL WEAKNESS: 0
WHEEZING: 0
SORE THROAT: 0
FEVER: 0
HEADACHES: 0
SHORTNESS OF BREATH: 0
BLURRED VISION: 0
MYALGIAS: 0
CONSTIPATION: 0
DIARRHEA: 0
ABDOMINAL PAIN: 0
CHILLS: 0
DIZZINESS: 1
NAUSEA: 1

## 2022-04-03 ASSESSMENT — PATIENT HEALTH QUESTIONNAIRE - PHQ9
8. MOVING OR SPEAKING SO SLOWLY THAT OTHER PEOPLE COULD HAVE NOTICED. OR THE OPPOSITE, BEING SO FIGETY OR RESTLESS THAT YOU HAVE BEEN MOVING AROUND A LOT MORE THAN USUAL: NOT AT ALL
1. LITTLE INTEREST OR PLEASURE IN DOING THINGS: SEVERAL DAYS
9. THOUGHTS THAT YOU WOULD BE BETTER OFF DEAD, OR OF HURTING YOURSELF: NOT AT ALL
SUM OF ALL RESPONSES TO PHQ QUESTIONS 1-9: 3
SUM OF ALL RESPONSES TO PHQ9 QUESTIONS 1 AND 2: 2
3. TROUBLE FALLING OR STAYING ASLEEP OR SLEEPING TOO MUCH: NOT AT ALL
5. POOR APPETITE OR OVEREATING: NOT AT ALL
4. FEELING TIRED OR HAVING LITTLE ENERGY: NOT AT ALL
6. FEELING BAD ABOUT YOURSELF - OR THAT YOU ARE A FAILURE OR HAVE LET YOURSELF OR YOUR FAMILY DOWN: NOT AL ALL
7. TROUBLE CONCENTRATING ON THINGS, SUCH AS READING THE NEWSPAPER OR WATCHING TELEVISION: SEVERAL DAYS
2. FEELING DOWN, DEPRESSED, IRRITABLE, OR HOPELESS: SEVERAL DAYS

## 2022-04-03 ASSESSMENT — COGNITIVE AND FUNCTIONAL STATUS - GENERAL
DAILY ACTIVITIY SCORE: 24
SUGGESTED CMS G CODE MODIFIER MOBILITY: CH
SUGGESTED CMS G CODE MODIFIER DAILY ACTIVITY: CH
MOBILITY SCORE: 24

## 2022-04-03 ASSESSMENT — LIFESTYLE VARIABLES
DOES PATIENT WANT TO STOP DRINKING: NO
TOTAL SCORE: 0
EVER HAD A DRINK FIRST THING IN THE MORNING TO STEADY YOUR NERVES TO GET RID OF A HANGOVER: NO
AVERAGE NUMBER OF DAYS PER WEEK YOU HAVE A DRINK CONTAINING ALCOHOL: 5
TOTAL SCORE: 0
HAVE YOU EVER FELT YOU SHOULD CUT DOWN ON YOUR DRINKING: NO
EVER FELT BAD OR GUILTY ABOUT YOUR DRINKING: NO
HAVE PEOPLE ANNOYED YOU BY CRITICIZING YOUR DRINKING: NO
TOTAL SCORE: 0
ON A TYPICAL DAY WHEN YOU DRINK ALCOHOL HOW MANY DRINKS DO YOU HAVE: 2
HOW MANY TIMES IN THE PAST YEAR HAVE YOU HAD 5 OR MORE DRINKS IN A DAY: 0
ALCOHOL_USE: YES
SUBSTANCE_ABUSE: 1
CONSUMPTION TOTAL: NEGATIVE

## 2022-04-03 ASSESSMENT — PAIN SCALES - WONG BAKER: WONGBAKER_NUMERICALRESPONSE: DOESN'T HURT AT ALL

## 2022-04-03 ASSESSMENT — PAIN DESCRIPTION - PAIN TYPE
TYPE: ACUTE PAIN
TYPE: ACUTE PAIN

## 2022-04-03 ASSESSMENT — PAIN SCALES - PAIN ASSESSMENT IN ADVANCED DEMENTIA (PAINAD)
BODYLANGUAGE: RELAXED
CONSOLABILITY: NO NEED TO CONSOLE
BREATHING: NORMAL

## 2022-04-03 ASSESSMENT — COPD QUESTIONNAIRES
DO YOU EVER COUGH UP ANY MUCUS OR PHLEGM?: NO/ONLY WITH OCCASIONAL COLDS OR INFECTIONS
HAVE YOU SMOKED AT LEAST 100 CIGARETTES IN YOUR ENTIRE LIFE: NO/DON'T KNOW
DURING THE PAST 4 WEEKS HOW MUCH DID YOU FEEL SHORT OF BREATH: NONE/LITTLE OF THE TIME
COPD SCREENING SCORE: 0

## 2022-04-03 ASSESSMENT — FIBROSIS 4 INDEX
FIB4 SCORE: 3.49
FIB4 SCORE: 3.49

## 2022-04-03 NOTE — ED NOTES
Report from LANCE Montano  Pt resting in [facility] with even and unlabored chest rise and fall. On monitor. L2K in chart.

## 2022-04-03 NOTE — ED NOTES
Remaining restraints removed by security personivy. Pt cooperative and calm. Stood up to urinate. Returned to bed
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
## 2022-04-03 NOTE — ED NOTES
Dr. Spicer messaged and notified regarding CPK 93840. Patient in no distress at this time. Vital signs are stable and denies any chest pain.

## 2022-04-03 NOTE — CARE PLAN
Problem: Safety - Violent/Self-destructive Restraint  Goal: Remains free of injury from restraints (Restraint for Violent/Self-Destructive Behavior)  Outcome: Progressing  Goal: Free from restraints (Restraint for Violent or Self-Destructive Behavior)  Outcome: Progressing     Problem: Pain - Standard  Goal: Alleviation of pain or a reduction in pain to the patient’s comfort goal  Outcome: Progressing     Problem: Knowledge Deficit - Standard  Goal: Patient and family/care givers will demonstrate understanding of plan of care, disease process/condition, diagnostic tests and medications  Outcome: Progressing   The patient is Stable - Low risk of patient condition declining or worsening         Progress made toward(s) clinical / shift goals:  progressing    Patient is not progressing towards the following goals:

## 2022-04-03 NOTE — ED NOTES
Unable to obtain medrec. Patient unable to participate in interview. No home pharmacy listed on file. No emergency contacts listed for patient.

## 2022-04-03 NOTE — PROGRESS NOTES
Patient transferred from ER, he is awake, alert and orient x 4. No chest pain or shortness of breath. Informed of safety and call system. In a sinus rhythm. He is on a legal 2000 per [place] [employer] for suicidal attempts and threatening others. Security at bedside and will provide 1-1 observation.

## 2022-04-03 NOTE — PROGRESS NOTES
4 Eyes Skin Assessment Completed by LANCE bang and LANCE Chowdary.    Head WDL  Ears WDL  Nose WDL  Mouth WDL  Neck WDL  Breast/Chest WDL  Shoulder Blades WDL  Spine WDL  (R) Arm/Elbow/Hand WDL  (L) Arm/Elbow/Hand WDL  Abdomen WDL  Groin WDL  Scrotum/Coccyx/Buttocks WDL  (R) Leg WDL  (L) Leg WDL  (R) Heel/Foot/Toe WDL  (L) Heel/Foot/Toe WDL          Devices In Places Tele Box      Interventions In Place N/A    Possible Skin Injury No    Pictures Uploaded Into Epic N/A  Wound Consult Placed N/A  RN Wound Prevention Protocol Ordered No

## 2022-04-03 NOTE — ED NOTES
Pt spilt water in bed, provided new sheets, noticed IV catheter had become unsecured and came out of pt.     Report to LANCE Kramer

## 2022-04-03 NOTE — ASSESSMENT & PLAN NOTE
CPK 11k, dehydrated  IV hydration  Place Heath catheter, monitor input and output closely  Follow-up with UA, urine electrolytes, renal ultrasound  Avoid nephrotoxins.

## 2022-04-03 NOTE — ASSESSMENT & PLAN NOTE
Presumed type II MI  secondary to  cardiotoxic effect of methamphetamine  UDS +ve for meth  IV hydration  Clonidine patch and IV lorazepam for adrenal crisis  Aspirin  We will avoid beta-blocker out of concern for potential for coronary spasm  We will consider Cardizem for tachycardia above 140  Monitor on telemetry, repeat troponin and EKG  ECHO - pending  ASA   Discussed with cardiology ( Dr Joanna Barroso )- EKG significant for pericarditis.  In the absence of chest pain, decided not to treat for pericarditis and will wait for the echocardiogram for further management plan.
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
## 2022-04-03 NOTE — ED NOTES
Med rec updated and complete, per pt   Allergies reviewed, per pt   Pt reports no prescription medications in the last 2 months or longer

## 2022-04-03 NOTE — ASSESSMENT & PLAN NOTE
SIRS criteria identified on my evaluation include:  Tachypnea, with respirations greater than 20 per minute and Leukocytosis, with WBC greater than 12,000  SIRS is non-infectious, the patient does not have sepsis      Probably secondary to stimulant intoxication,?  Rhabdomyolysis, type II MI    Follow with blood culture, UA, chest x-ray

## 2022-04-03 NOTE — ED NOTES
Patient appears calm at this time and denies chest pain. No SOB. Vital signs are stable at this time.
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
## 2022-04-03 NOTE — ED PROVIDER NOTES
ED Provider Note    Scribed for Miguel Angel Curry by Ramez Huizar. 4/2/2022  7:18 PM    Primary care provider: Patient reports no primary care  Means of arrival: EMS  History obtained from: Patient, EMS   History limited by: Altered mental status    CHIEF COMPLAINT  Chief Complaint   Patient presents with   • Aggressive Behavior     [facility] called by [employer] for aggressive behavior- pt striking others & agitated in [employer], hx of meth abuse, white substance on pt's face. Pt placed on a Legal Hold by [employer] due to erratic behavior and risk of running into traffic. Denies SI at this time. Total of 5mg versed IM given by [facility] & hard restraints continued on arrival by face-to-face order by ERP.     HPI  Thomas James Dancer V is a 21 y.o. male who presents to the Emergency Department for acute drug ingestion. EMS reports the patient was found in a [employer] store where he was aggressive. Due to this the patient was brought via EMS to the ED with police. EMS reports no other associated symptoms.  EMS tried to sedate him with 5 mg IM Valium with little effect.  There are no alleviating or exacerbating factors reported at this time. EMS denies all other symptoms. Patient denies associated abdominal pain or chest pain.  Patient is acutely psychotic upon arrival, refusing to participate, requiring multiple security officers to hold him down and is aggressive and showing signs of self harm as well as trying to reflect harm on others.    Quality: Dull  Duration: 1 day  Severity: Mild  Associated sx: None    Patient history limited secondary to altered mental status.    REVIEW OF SYSTEMS  See HPI for further details.     Patient review of system limited secondary to altered mental status.    PAST MEDICAL HISTORY     SURGICAL HISTORY  patient denies any surgical history  SOCIAL HISTORY  Social History     Tobacco Use   • Smoking status: Current Every Day Smoker     Packs/day: 0.50     Years: 2.00     Pack years: 1.00     Types:  "Cigarettes   • Smokeless tobacco: Never Used   Vaping Use   • Vaping Use: Never used   Substance Use Topics   • Alcohol use: Yes   • Drug use: Yes     Comment: meth      Social History     Substance and Sexual Activity   Drug Use Yes    Comment: meth     FAMILY HISTORY  Unable to obtain family history due to patient's altered mental status.   CURRENT MEDICATIONS  No current outpatient medications   ALLERGIES  Allergies   Allergen Reactions   • Bee        PHYSICAL EXAM    VITAL SIGNS:   Vitals:    04/02/22 1928 04/02/22 1931 04/02/22 2019 04/02/22 2259   BP:  130/85 130/70 130/70   Pulse:  (!) 160 (!) 120 (!) 101   Resp:  (!) 22 (!) 28 (!) 26   Temp:  36.8 °C (98.3 °F)     TempSrc:  Temporal     SpO2:  95% 95% 95%   Weight: 72.6 kg (160 lb)      Height: 1.803 m (5' 11\")         Vitals: My interpretation: normotensive, tachycardic, afebrile, not hypoxic    Reinterpretation of vitals: Normotensive, persistent tachycardia but improving, mild tachypnea, not hypoxic    Cardiac Monitor Interpretation: The cardiac monitor revealed normal Sinus Rhythm with tachycardia as interpreted by me. The cardiac monitor was ordered secondary to the patient's history of NSTEMI, tachycardia and to monitor for dysrhythmia and/or tachycardia.    PE:   Constitutional: In acute distress upon arrival, aggressive and fighting off nursing, myself and security officers   HENT: Normocephalic, Atraumatic, Bilateral external ears normal, Oropharynx is clear mucous membranes are moist. No oral exudates or nasal discharge.   Eyes: Pupils extremely dilated. EOMI, Conjunctiva normal, No discharge.   Neck: Normal range of motion, No tenderness, Supple, No stridor. No meningismus.  Lymphatic: No lymphadenopathy noted.   Cardiovascular: Regular rhythm without murmur rub or gallop. Tachycardic.  Thorax & Lungs: Clear breath sounds bilaterally without wheezes, rhonchi or rales. There is no chest wall tenderness.   Abdomen: Soft non-tender non-distended. " There is no rebound or guarding. No organomegaly is appreciated. Bowel sounds are normal.  Skin: Normal without rash. Mild diaphoresis  Back: No CVA or spinal tenderness.   Extremities: Intact distal pulses, No edema, No tenderness, No cyanosis, No clubbing. Capillary refill is less than 2 seconds.  Musculoskeletal: Good range of motion in all major joints. No tenderness to palpation or major deformities noted.   Neurologic: Unable to assess.  Psychiatric: Refuses to cooperate, will not answer questions, appears paranoid.    DIAGNOSTIC STUDIES / PROCEDURES    LABS  Results for orders placed or performed during the hospital encounter of 04/02/22   CBC WITH DIFFERENTIAL   Result Value Ref Range    WBC 19.1 (H) 4.8 - 10.8 K/uL    RBC 5.95 4.70 - 6.10 M/uL    Hemoglobin 18.9 (H) 14.0 - 18.0 g/dL    Hematocrit 53.1 (H) 42.0 - 52.0 %    MCV 89.2 81.4 - 97.8 fL    MCH 31.8 27.0 - 33.0 pg    MCHC 35.6 (H) 33.7 - 35.3 g/dL    RDW 42.5 35.9 - 50.0 fL    Platelet Count 317 164 - 446 K/uL    MPV 9.7 9.0 - 12.9 fL    Neutrophils-Polys 82.10 (H) 44.00 - 72.00 %    Lymphocytes 6.60 (L) 22.00 - 41.00 %    Monocytes 10.30 0.00 - 13.40 %    Eosinophils 0.00 0.00 - 6.90 %    Basophils 0.20 0.00 - 1.80 %    Immature Granulocytes 0.80 0.00 - 0.90 %    Nucleated RBC 0.00 /100 WBC    Neutrophils (Absolute) 15.69 (H) 1.82 - 7.42 K/uL    Lymphs (Absolute) 1.27 1.00 - 4.80 K/uL    Monos (Absolute) 1.96 (H) 0.00 - 0.85 K/uL    Eos (Absolute) 0.00 0.00 - 0.51 K/uL    Baso (Absolute) 0.04 0.00 - 0.12 K/uL    Immature Granulocytes (abs) 0.15 (H) 0.00 - 0.11 K/uL    NRBC (Absolute) 0.00 K/uL   URINE DRUG SCREEN   Result Value Ref Range    Amphetamines Urine Positive (A) Negative    Barbiturates Negative Negative    Benzodiazepines Positive (A) Negative    Cocaine Metabolite Negative Negative    Methadone Negative Negative    Opiates Negative Negative    Oxycodone Negative Negative    Phencyclidine -Pcp Negative Negative    Propoxyphene Negative  Negative    Cannabinoid Metab Positive (A) Negative   Comp Metabolic Panel   Result Value Ref Range    Sodium 148 (H) 135 - 145 mmol/L    Potassium 3.7 3.6 - 5.5 mmol/L    Chloride 109 96 - 112 mmol/L    Co2 19 (L) 20 - 33 mmol/L    Anion Gap 20.0 (H) 7.0 - 16.0    Glucose 88 65 - 99 mg/dL    Bun 21 8 - 22 mg/dL    Creatinine 2.22 (H) 0.50 - 1.40 mg/dL    Calcium 9.7 8.5 - 10.5 mg/dL    AST(SGOT) 120 (H) 12 - 45 U/L    ALT(SGPT) 25 2 - 50 U/L    Alkaline Phosphatase 68 30 - 99 U/L    Total Bilirubin 0.9 0.1 - 1.5 mg/dL    Albumin 5.2 (H) 3.2 - 4.9 g/dL    Total Protein 7.7 6.0 - 8.2 g/dL    Globulin 2.5 1.9 - 3.5 g/dL    A-G Ratio 2.1 g/dL   TROPONIN   Result Value Ref Range    Troponin T 403 (H) 6 - 19 ng/L   DIAGNOSTIC ALCOHOL   Result Value Ref Range    Diagnostic Alcohol <10.1 0.0 - 10.0 mg/dL   ESTIMATED GFR   Result Value Ref Range    GFR (CKD-EPI) 42 (A) >60 mL/min/1.73 m 2   PHOSPHORUS   Result Value Ref Range    Phosphorus 4.4 2.5 - 4.5 mg/dL   Magnesium   Result Value Ref Range    Magnesium 2.1 1.5 - 2.5 mg/dL   Urine Sodium Random   Result Value Ref Range    Sodium, Urine -per volume 142 mmol/L   Urine Creatinine Random   Result Value Ref Range    Creatinine, Random Urine 353.22 mg/dL   proBrain Natriuretic Peptide, NT   Result Value Ref Range    NT-proBNP 471 (H) 0 - 125 pg/mL   Comp Metabolic Panel   Result Value Ref Range    Sodium 148 (H) 135 - 145 mmol/L    Potassium 4.0 3.6 - 5.5 mmol/L    Chloride 108 96 - 112 mmol/L    Co2 18 (L) 20 - 33 mmol/L    Anion Gap 22.0 (H) 7.0 - 16.0    Glucose 74 65 - 99 mg/dL    Bun 20 8 - 22 mg/dL    Creatinine 2.06 (H) 0.50 - 1.40 mg/dL    Calcium 9.8 8.5 - 10.5 mg/dL    AST(SGOT) 157 (H) 12 - 45 U/L    ALT(SGPT) 28 2 - 50 U/L    Alkaline Phosphatase 69 30 - 99 U/L    Total Bilirubin 1.0 0.1 - 1.5 mg/dL    Albumin 5.3 (H) 3.2 - 4.9 g/dL    Total Protein 7.8 6.0 - 8.2 g/dL    Globulin 2.5 1.9 - 3.5 g/dL    A-G Ratio 2.1 g/dL   CREATINE KINASE   Result Value Ref Range     CPK Total 45792 (HH) 0 - 154 U/L   TROPONIN   Result Value Ref Range    Troponin T 330 (H) 6 - 19 ng/L   URINALYSIS    Specimen: Urine, Heath Cath   Result Value Ref Range    Color Yellow     Character Clear     Specific Gravity >=1.030 <1.035    Ph 5.0 5.0 - 8.0    Glucose Negative Negative mg/dL    Ketones Trace (A) Negative mg/dL    Protein 100 (A) Negative mg/dL    Bilirubin Small (A) Negative    Urobilinogen, Urine 0.2 Negative    Nitrite Negative Negative    Leukocyte Esterase Negative Negative    Occult Blood Large (A) Negative    Micro Urine Req Microscopic    URINE MICROSCOPIC (W/UA)   Result Value Ref Range    WBC 10-20 (A) /hpf    RBC 0-2 (A) /hpf    Bacteria Negative None /hpf    Epithelial Cells Negative /hpf    Amorphous Crystal Present /hpf    Hyaline Cast 11-20 (A) /lpf    Granular Casts 0-2 /lpf    Sperm Few /hpf   CBC with Differential   Result Value Ref Range    WBC 11.8 (H) 4.8 - 10.8 K/uL    RBC 5.55 4.70 - 6.10 M/uL    Hemoglobin 17.4 14.0 - 18.0 g/dL    Hematocrit 51.5 42.0 - 52.0 %    MCV 92.8 81.4 - 97.8 fL    MCH 31.4 27.0 - 33.0 pg    MCHC 33.8 33.7 - 35.3 g/dL    RDW 44.5 35.9 - 50.0 fL    Platelet Count 247 164 - 446 K/uL    MPV 9.7 9.0 - 12.9 fL    Neutrophils-Polys 74.90 (H) 44.00 - 72.00 %    Lymphocytes 14.30 (L) 22.00 - 41.00 %    Monocytes 10.10 0.00 - 13.40 %    Eosinophils 0.00 0.00 - 6.90 %    Basophils 0.20 0.00 - 1.80 %    Immature Granulocytes 0.50 0.00 - 0.90 %    Nucleated RBC 0.00 /100 WBC    Neutrophils (Absolute) 8.84 (H) 1.82 - 7.42 K/uL    Lymphs (Absolute) 1.69 1.00 - 4.80 K/uL    Monos (Absolute) 1.19 (H) 0.00 - 0.85 K/uL    Eos (Absolute) 0.00 0.00 - 0.51 K/uL    Baso (Absolute) 0.02 0.00 - 0.12 K/uL    Immature Granulocytes (abs) 0.06 0.00 - 0.11 K/uL    NRBC (Absolute) 0.00 K/uL   ESTIMATED GFR   Result Value Ref Range    GFR (CKD-EPI) 46 (A) >60 mL/min/1.73 m 2   EKG (NOW)   Result Value Ref Range    Report       [facility] Emergency  Dept.    Test Date:  2022  Pt Name:    THOMAS DANCER                Department: ER  MRN:        [id number]                      Room:       GR 31  Gender:     Male                         Technician: [id number]  :        2000                   Requested By:ALYSSA JORDAN  Order #:    [id number]                    Reading MD: Alyssa Jordan    Measurements  Intervals                                Axis  Rate:       125                          P:          67  LA:         152                          QRS:        90  QRSD:       96                           T:          49  QT:         312  QTc:        450    Interpretive Statements  SINUS TACHYCARDIA  BORDERLINE RIGHT AXIS DEVIATION  ST ELEV, PROBABLE NORMAL EARLY REPOL PATTERN  Compared to ECG 2021 16:28:39  ST (T wave) deviation now present  Electronically Signed On 2022 20:43:22 PDT by Alyssa Jordan     EKG (NOW)   Result Value Ref Range    Report       [facility] Emergency Dept.    Test Date:  2022  Pt Name:    THOMAS DANCER                Department: ER  MRN:        [id number]                      Room:        31  Gender:     Male                         Technician: [id number]  :        2000                   Requested By:ALYSSA JORDAN  Order #:    [id number]                    Reading MD: Alyssa Jordan    Measurements  Intervals                                Axis  Rate:       115                          P:          74  LA:         149                          QRS:        91  QRSD:       101                          T:          61  QT:         323  QTc:        447    Interpretive Statements  Sinus tachycardia  Probable anterolateral infarct, acute  ST elevation, consider inferior injury  Compared to ECG 2022 20:38:00  Myocardial infarct finding now present  ST (T wave) deviation still present  Electronically Signed On 2022 21:55:37 PDT by Alyssa Jordan        All labs reviewed by me.  Significant for no leukocytosis, no anemia, urine drug screen positive for amphetamines and benzodiazepines as well as cannabinoid, electrolytes are fairly unremarkable, renal function shows KERRY of 2.22, troponin of 403, alcohol negative, phosphorus and mag are normal, urinalysis negative for infection, trace ketones, BNP slightly elevated, CPK of 11,000, second troponin of 330    COURSE & MEDICAL DECISION MAKING  Nursing notes, VS, PMSFHx, labs, imaging, EKG reviewed in chart.    Heart Score: High risk    MDM: 7:18 PM Thomas James Dancer V is a 21 y.o. male who presented with acute psychosis, agitation, aggressive behavior, suspected polysubstance abuse.  EMS tried giving 5 IM Valium with little effect.  Patient requiring police and security to restrain him and he was placed in physical restraints given Ativan, Benadryl, Haldol for chemical sedation as he is currently danger to himself and others.  Vital signs show hypertension, severe sinus tachycardia, IVs were placed, and patient placed on IV fluid resuscitation.  Initial EKG concerning for signs of ischemic change and strain, labs show leukocytosis likely reactive, elevated troponin of 400, KERRY of 2.2, CK of 11,000 all consistent with rhabdomyolysis, endorgan damage, dehydration likely secondary to polysubstance abuse.  Patient's urine drug screen positive for methamphetamine.  Patient was given 3 L IV fluids in the ED and showed improvement, repeat EKG shows resolution of initial EKG ischemic strain signs.  Considering the patient's multiple lab derangements, he was admitted to the hospitalist for further evaluation and treatment.  No indication for heparin drip as likely patient's NSTEMI is secondary to strain rather than obstructive process in a healthy 21-year-old.  Repeat troponin showed improvement.    HYDRATION: Based on the patient's presentation of Acute Vomiting, Dehydration and Inability to take oral fluids the patient was given IV fluids. IV  Hydration was used because oral hydration was not adequate alone. Upon recheck following hydration, the patient was imroved.    CRITICAL CARE TIME 40 minutes: NSTEMI, dehydration, acute rhabdomyolysis, KERRY, polysubstance abuse, methamphetamine use, leukocytosis, acute agitation and psychosis requiring chemical and physical sedation  There was a very real possibility of deterioration of the patient's condition.  This patient required the highest level of care.  I provided critical care services which included: review of the medical record, treatment orders, ordering and reviewing test results, frequent reevaluation of the patient's condition and response to treatment, as well as discussing the case with appropriate personnel and various consultants. The critical care time associated with the care of this patient is exclusive of any procedures or specific interventions.    FINAL IMPRESSION  1. NSTEMI (non-ST elevated myocardial infarction) (HCC) Acute   2. Aggressive behavior Acute   3. Psychosis, unspecified psychosis type (HCC) Acute   4. Polysubstance abuse (HCC) Acute   5. KERRY (acute kidney injury) (HCC) Acute   6. Non-traumatic rhabdomyolysis Acute   7. Leukocytosis, unspecified type Acute       Ramez MELISSA (Leah), am scribing for, and in the presence of, Miguel Angel Curry.    Electronically signed by: Ramez Huizar (Leah), 4/2/2022    IMiguel Angel personally performed the services described in this documentation, as scribed by Ramez Huizar in my presence, and it is both accurate and complete.    The note accurately reflects work and decisions made by me.  Miguel Angel Curry  4/3/2022  12:57 AM

## 2022-04-03 NOTE — ED NOTES
Pt up to use urinal, pt awake and alert.   Placed back on monitor, water and warm blanket provided, pt resting on gurney

## 2022-04-04 VITALS
HEART RATE: 75 BPM | SYSTOLIC BLOOD PRESSURE: 98 MMHG | OXYGEN SATURATION: 95 % | DIASTOLIC BLOOD PRESSURE: 60 MMHG | WEIGHT: 154.1 LBS | BODY MASS INDEX: 21.57 KG/M2 | RESPIRATION RATE: 18 BRPM | HEIGHT: 71 IN | TEMPERATURE: 97.6 F

## 2022-04-04 LAB
ALBUMIN SERPL BCP-MCNC: 3.7 G/DL (ref 3.2–4.9)
ALBUMIN/GLOB SERPL: 1.8 G/DL
ALP SERPL-CCNC: 48 U/L (ref 30–99)
ALT SERPL-CCNC: 96 U/L (ref 2–50)
ANION GAP SERPL CALC-SCNC: 11 MMOL/L (ref 7–16)
AST SERPL-CCNC: 498 U/L (ref 12–45)
BASOPHILS # BLD AUTO: 0.4 % (ref 0–1.8)
BASOPHILS # BLD: 0.03 K/UL (ref 0–0.12)
BILIRUB SERPL-MCNC: 0.7 MG/DL (ref 0.1–1.5)
BUN SERPL-MCNC: 11 MG/DL (ref 8–22)
CALCIUM SERPL-MCNC: 8.4 MG/DL (ref 8.5–10.5)
CHLORIDE SERPL-SCNC: 108 MMOL/L (ref 96–112)
CO2 SERPL-SCNC: 22 MMOL/L (ref 20–33)
CREAT SERPL-MCNC: 0.7 MG/DL (ref 0.5–1.4)
EOSINOPHIL # BLD AUTO: 0.16 K/UL (ref 0–0.51)
EOSINOPHIL NFR BLD: 2.1 % (ref 0–6.9)
ERYTHROCYTE [DISTWIDTH] IN BLOOD BY AUTOMATED COUNT: 42.3 FL (ref 35.9–50)
GFR SERPLBLD CREATININE-BSD FMLA CKD-EPI: 134 ML/MIN/1.73 M 2
GLOBULIN SER CALC-MCNC: 2.1 G/DL (ref 1.9–3.5)
GLUCOSE SERPL-MCNC: 107 MG/DL (ref 65–99)
HCT VFR BLD AUTO: 43.1 % (ref 42–52)
HGB BLD-MCNC: 15 G/DL (ref 14–18)
IMM GRANULOCYTES # BLD AUTO: 0.01 K/UL (ref 0–0.11)
IMM GRANULOCYTES NFR BLD AUTO: 0.1 % (ref 0–0.9)
LYMPHOCYTES # BLD AUTO: 3.21 K/UL (ref 1–4.8)
LYMPHOCYTES NFR BLD: 42.3 % (ref 22–41)
MCH RBC QN AUTO: 31.6 PG (ref 27–33)
MCHC RBC AUTO-ENTMCNC: 34.8 G/DL (ref 33.7–35.3)
MCV RBC AUTO: 90.7 FL (ref 81.4–97.8)
MONOCYTES # BLD AUTO: 0.59 K/UL (ref 0–0.85)
MONOCYTES NFR BLD AUTO: 7.8 % (ref 0–13.4)
NEUTROPHILS # BLD AUTO: 3.58 K/UL (ref 1.82–7.42)
NEUTROPHILS NFR BLD: 47.3 % (ref 44–72)
NRBC # BLD AUTO: 0 K/UL
NRBC BLD-RTO: 0 /100 WBC
PLATELET # BLD AUTO: 194 K/UL (ref 164–446)
PMV BLD AUTO: 9.3 FL (ref 9–12.9)
POTASSIUM SERPL-SCNC: 3.7 MMOL/L (ref 3.6–5.5)
PROT SERPL-MCNC: 5.8 G/DL (ref 6–8.2)
RBC # BLD AUTO: 4.75 M/UL (ref 4.7–6.1)
SODIUM SERPL-SCNC: 141 MMOL/L (ref 135–145)
WBC # BLD AUTO: 7.6 K/UL (ref 4.8–10.8)

## 2022-04-04 PROCEDURE — A9270 NON-COVERED ITEM OR SERVICE: HCPCS | Performed by: INTERNAL MEDICINE

## 2022-04-04 PROCEDURE — 85025 COMPLETE CBC W/AUTO DIFF WBC: CPT

## 2022-04-04 PROCEDURE — 700102 HCHG RX REV CODE 250 W/ 637 OVERRIDE(OP): Performed by: INTERNAL MEDICINE

## 2022-04-04 PROCEDURE — 99239 HOSP IP/OBS DSCHRG MGMT >30: CPT | Performed by: STUDENT IN AN ORGANIZED HEALTH CARE EDUCATION/TRAINING PROGRAM

## 2022-04-04 PROCEDURE — 700111 HCHG RX REV CODE 636 W/ 250 OVERRIDE (IP): Performed by: INTERNAL MEDICINE

## 2022-04-04 PROCEDURE — 700105 HCHG RX REV CODE 258: Performed by: INTERNAL MEDICINE

## 2022-04-04 PROCEDURE — 80053 COMPREHEN METABOLIC PANEL: CPT

## 2022-04-04 RX ADMIN — ASPIRIN 325 MG ORAL TABLET 325 MG: 325 PILL ORAL at 04:35

## 2022-04-04 RX ADMIN — DEXTROSE AND SODIUM CHLORIDE: 5; 450 INJECTION, SOLUTION INTRAVENOUS at 02:05

## 2022-04-04 RX ADMIN — SENNOSIDES AND DOCUSATE SODIUM 2 TABLET: 50; 8.6 TABLET ORAL at 04:35

## 2022-04-04 RX ADMIN — ENOXAPARIN SODIUM 40 MG: 40 INJECTION SUBCUTANEOUS at 04:35

## 2022-04-04 NOTE — DISCHARGE SUMMARY
Discharge Summary    CHIEF COMPLAINT ON ADMISSION  Chief Complaint   Patient presents with   • Aggressive Behavior     [facility] called by [employer] for aggressive behavior- pt striking others & agitated in [employer], hx of meth abuse, white substance on pt's face. Pt placed on a Legal Hold by [employer] due to erratic behavior and risk of running into traffic. Denies SI at this time. Total of 5mg versed IM given by [facility] & hard restraints continued on arrival by face-to-face order by ERP.       Reason for Admission  NSTEMI (non-ST elevated myocardial*     Admission Date  4/2/2022    CODE STATUS  Full Code    HPI & HOSPITAL COURSE  This is a 21 y.o. male here with methamphetamine/cocaine overdose  A 21 y.o. male with past medical history of methamphetamineabuse, who presented 4/2/2022 with aggressive behavior.  [facility] was called by [employer] when patient noted striking other and agitated in [employer] with white substance on the face.  He was placed on legal hold by [employer] due to erratic behavior.  Patient was given 5 mg of Versed intramuscularly by [facility] and was placed on behavioral restraints by security upon arrival.  He noted to have tachycardia 120, respiratory rate 22-28, blood pressure stable, on room air.  At this time he is somnolent and unable to maintain conversation.  His chemistry panel significant for sodium 148, creatinine 2.22 with anion gap 20, , troponin 403, CPK 11k.  EKG showed ST elevation in anterior leads.  I spoke to Dr. Patton/cardiology, who agreed to consult.  Patient additionally received Benadryl 25 mg, Haldol 5 mg IV, lorazepam 2 mg IV, 1 L of normal saline bolus.    Patient was subsequently evaluated by cardiology for elevated cardiac enzymes in addition to EKG findings.  Was deemed EKG likely represented early repolarization abnormalities in the setting of a hyperdynamic states secondary to substance abuse which also further explains elevated cardiac enzyme markers.  No indication for further  intervention from cardiology.  Patient was subsequently monitored overnight on telemetry had no episodes of chest pain was also evaluated by psychiatry who deemed it unnecessary to extend legal hold and initially put in place by [place] [employer].  Patient had full capacity was alert and oriented x4 at goal-directed thought and was lucid and thinking on day of discharge.  Patient absolutely denied homicidal or suicidal ideation on day of discharge.  Patient was given information about follow-up with renal behavioral health.  It should be noted that patient's urine drug screen was positive for cannabinoids in addition to amphetamines.    Therefore, he is discharged in good and stable condition to home with close outpatient follow-up.    The patient met 2-midnight criteria for an inpatient stay at the time of discharge.    Discharge Date  4/4/2022    FOLLOW UP ITEMS POST DISCHARGE  Avoid illicit substances    DISCHARGE DIAGNOSES  Principal Problem:    Idiopathic pericarditis POA: Yes  Active Problems:    Psychoactive substance-induced psychosis (HCC) POA: Unknown    Acute kidney injury (HCC) POA: Unknown    Hypernatremia POA: Unknown    SIRS (systemic inflammatory response syndrome) (HCC) POA: Unknown    Rhabdomyolysis POA: Unknown  Resolved Problems:    * No resolved hospital problems. *      FOLLOW UP    [facility] ([facility]) - Primary Care  [address] [place] [id number] [phone]  Go in 1 week  post dc follow up     [facility] [address] [place] [address] [phone]  Schedule an appointment as soon as possible for a visit in 1 week  follow up post dc from hospital       MEDICATIONS ON DISCHARGE     Medication List      CONTINUE taking these medications      Instructions   VITAMIN C PO   Take 1 Tablet by mouth every day.  Dose: 1 Tablet            Allergies  Allergies   Allergen Reactions   • Bee Swelling     Pt reports that he swells  up where he gets stung        DIET  Orders Placed This Encounter   Procedures   • Diet Order Diet: Cardiac; Miscellaneous modifications: (optional): No Decaf, No Caffeine(for test)     Standing Status:   Standing     Number of Occurrences:   1     Order Specific Question:   Diet:     Answer:   Cardiac [6]     Order Specific Question:   Miscellaneous modifications: (optional)     Answer:   No Decaf, No Caffeine(for test) [11]       ACTIVITY  As tolerated.  Weight bearing as tolerated    CONSULTATIONS  Cardiology    PROCEDURES  None    LABORATORY  Lab Results   Component Value Date    SODIUM 141 04/04/2022    POTASSIUM 3.7 04/04/2022    CHLORIDE 108 04/04/2022    CO2 22 04/04/2022    GLUCOSE 107 (H) 04/04/2022    BUN 11 04/04/2022    CREATININE 0.70 04/04/2022        Lab Results   Component Value Date    WBC 7.6 04/04/2022    HEMOGLOBIN 15.0 04/04/2022    HEMATOCRIT 43.1 04/04/2022    PLATELETCT 194 04/04/2022        Total time of the discharge process exceeds 35 minutes.

## 2022-04-04 NOTE — CARE PLAN
The patient is Stable - Low risk of patient condition declining or worsening    Shift Goals  Clinical Goals: Safety  Patient Goals: Rest    Progress made toward(s) clinical / shift goals:      Problem: Pain - Standard  Goal: Alleviation of pain or a reduction in pain to the patient’s comfort goal  Outcome: Progressing  Flowsheets (Taken 4/3/2022 1945)  Pain Rating Scale (NPRS): 0  Note: No interventions needed at this time. Will continue to assess and treat accordingly.     Problem: Knowledge Deficit - Standard  Goal: Patient and family/care givers will demonstrate understanding of plan of care, disease process/condition, diagnostic tests and medications  Outcome: Progressing  Note: Educated patient on POC, pain management, medication administration, ambulation, safety precautions, diet, rest, interventions in place to maintain/ improve skin integrity, labs. Will continue to educate patient on POC accordingly.

## 2022-04-04 NOTE — CONSULTS
Consults   Cardiology Initial Consultation    Date of Service  4/3/2022    Referring Physician  Rain Spicer M.D.    Reason for Consultation  Abnormal ECG, abnormal troponin    History of Presenting Illness  Waldemar Anaya is a 21 y.o. male admitted 4/2/2022 with acute encephalopathy, aggressive behavior, on psychiatric hold.    Uses methamphetamines and marijuana.  Evaluation the hospital included an ECG which showed diffuse ST elevation which could represent pericarditis or early repolarization.  High-sensitivity troponin abnormal at 403, 330, 332, 313.    When I saw patient, he was awake and eating.  Not limited by chest pain, pressure or tightness with activity.   No significant dyspnea on exertion, orthopnea or lower extremity swelling.   No significant palpitations, lightheadedness, or presyncope/syncope.   No symptoms of leg claudication.   No stroke/TIA like symptoms.    Not aware of any family history of CAD or sudden cardiac death.    Review of Systems  Review of Systems    All systems reviewed and negative.    Past Medical History   has no past medical history on file.    Surgical History   has no past surgical history on file.    Family History  family history is not on file.  No family history of sudden cardiac death.    Social History   reports that he has been smoking cigarettes. He has a 1.00 pack-year smoking history. He has never used smokeless tobacco. He reports current alcohol use. He reports current drug use.    Medications  Prior to Admission Medications   Prescriptions Last Dose Informant Patient Reported? Taking?   Ascorbic Acid (VITAMIN C PO) > 2 weeks at unknown Patient Yes Yes   Sig: Take 1 Tablet by mouth every day.      Facility-Administered Medications: None       Allergies  Allergies   Allergen Reactions   • Bee Swelling     Pt reports that he swells up where he gets stung        Vital signs in last 24 hours  Temp:  [35.9 °C (96.6 °F)-36.8 °C (98.3 °F)] 35.9 °C (96.6 °F)  Pulse:   [] 74  Resp:  [16-28] 18  BP: (102-130)/(54-85) 125/76  SpO2:  [95 %-97 %] 96 %    Physical Exam  Physical Exam      General: No acute distress. Well nourished.  HEENT: EOM grossly intact, no scleral icterus, no pharyngeal erythema.   Neck:  No JVD, no bruits, trachea midline  CVS: RRR. Normal S1, S2. No M/R/G. No LE edema.  2+ radial pulses, 2+ PT pulses  Resp: CTAB. No wheezing or crackles/rhonchi. Normal respiratory effort.  Abdomen: Soft, NT, no helga hepatomegaly.  MSK/Ext: No clubbing or cyanosis.  Skin: Warm and dry, no rashes.  Neurological: CN III-XII grossly intact. No focal deficits.   Psych: A&O x 3, appropriate affect, good judgement      Lab Review  Lab Results   Component Value Date/Time    WBC 11.8 (H) 04/03/2022 12:26 AM    RBC 5.55 04/03/2022 12:26 AM    HEMOGLOBIN 17.4 04/03/2022 12:26 AM    HEMATOCRIT 51.5 04/03/2022 12:26 AM    MCV 92.8 04/03/2022 12:26 AM    MCH 31.4 04/03/2022 12:26 AM    MCHC 33.8 04/03/2022 12:26 AM    MPV 9.7 04/03/2022 12:26 AM      Lab Results   Component Value Date/Time    SODIUM 141 04/03/2022 06:00 PM    POTASSIUM 4.1 04/03/2022 12:26 AM    CHLORIDE 110 04/03/2022 12:26 AM    CO2 21 04/03/2022 12:26 AM    GLUCOSE 78 04/03/2022 12:26 AM    BUN 19 04/03/2022 12:26 AM    CREATININE 1.77 (H) 04/03/2022 12:26 AM      Lab Results   Component Value Date/Time    ASTSGOT 263 (H) 04/03/2022 12:26 AM    ALTSGPT 41 04/03/2022 12:26 AM     Lab Results   Component Value Date/Time    CHOLSTRLTOT 142 04/03/2022 12:26 AM    LDL 62 04/03/2022 12:26 AM    HDL 67 04/03/2022 12:26 AM    TRIGLYCERIDE 64 04/03/2022 12:26 AM    TROPONINT 313 (H) 04/03/2022 03:29 AM       Recent Labs     04/02/22  2223   NTPROBNP 471*       Cardiac Imaging and Procedures Review  EKG:  My personal interpretation of the EKG dated 4/3/2022 is sinus tachycardia, rate 125, borderline right axis, ST elevation, probably early repolarization    Echocardiogram: 4/3/2022  Normal echo, EF  65%.        Imaging  EC-ECHOCARDIOGRAM COMPLETE W/O CONT   Final Result      DX-CHEST-LIMITED (1 VIEW)   Final Result      No acute cardiopulmonary disease.      US-RENAL   Final Result      1.  Mildly echogenic RIGHT kidney, possibly technical although medical renal disease.   2.  Unremarkable LEFT kidney.   3.  No hydronephrosis.            Assessment/Plan  -Abnormal ECG, diffuse ST elevation, probably early repolarization  -Abnormal high-sensitivity troponin related to drug use  -Methamphetamine and cannabis use    Plan:  -Echocardiogram is normal  -No cardiac needs at this time  -Long discussion with patient and that he needs to stop using methamphetamines    [facility]    Cardiology will sign off, please recall with questions or concerns.    Thank you for allowing me to participate in the care of this patient.    Please contact me with any questions.    Joanna Barroso M.D.   Cardiologist, [facility]  [phone]
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
## 2022-04-04 NOTE — CARE PLAN
The patient is Stable - Low risk of patient condition declining or worsening    Shift Goals  Clinical Goals: discharge planning  Patient Goals: Rest    Problem: Knowledge Deficit - Standard  Goal: Patient and family/care givers will demonstrate understanding of plan of care, disease process/condition, diagnostic tests and medications  Outcome: Progressing     Problem: Provide Safe Environment  Goal: Suicide environmental safety, protocols, policies, and practices will be implemented  Outcome: Progressing

## 2022-04-04 NOTE — PROGRESS NOTES
S/w Bri LUCAS. [facility] order placed.Bri LUCAS will check with patient that he has transport arrangements prior to pt transporting to [facility].     0815  Pt will have ride. [facility] CNA to transport pt to [facility].

## 2022-04-04 NOTE — PROGRESS NOTES
Spoke with Juliana in regard to patients legal hold status. Per review of documentation and chart patient is not on legal hold.

## 2022-04-04 NOTE — DISCHARGE INSTRUCTIONS
Discharge Instructions    Discharged to home by car with friend. Discharged via wheelchair, hospital escort: Yes.  Special equipment needed: Not Applicable    Be sure to schedule a follow-up appointment with your primary care doctor or any specialists as instructed.     Discharge Plan:   Diet Plan: Discussed  Activity Level: Discussed  Confirmed Follow up Appointment: Appointment Scheduled  Confirmed Symptoms Management: Discussed  Medication Reconciliation Updated: Yes    I understand that a diet low in cholesterol, fat, and sodium is recommended for good health. Unless I have been given specific instructions below for another diet, I accept this instruction as my diet prescription.   Other diet: cardiac    Special Instructions: None    · Is patient discharged on Warfarin / Coumadin?   No     Depression / Suicide Risk    As you are discharged from this [facility] facility, it is important to learn how to keep safe from harming yourself.    Recognize the warning signs:  · Abrupt changes in personality, positive or negative- including increase in energy   · Giving away possessions  · Change in eating patterns- significant weight changes-  positive or negative  · Change in sleeping patterns- unable to sleep or sleeping all the time   · Unwillingness or inability to communicate  · Depression  · Unusual sadness, discouragement and loneliness  · Talk of wanting to die  · Neglect of personal appearance   · Rebelliousness- reckless behavior  · Withdrawal from people/activities they love  · Confusion- inability to concentrate     If you or a loved one observes any of these behaviors or has concerns about self-harm, here's what you can do:  · Talk about it- your feelings and reasons for harming yourself  · Remove any means that you might use to hurt yourself (examples: pills, rope, extension cords, firearm)  · Get professional help from the community (Mental Health, Substance Abuse, psychological counseling)  · Do not be  alone:Call your Safe Contact- someone whom you trust who will be there for you.  · Call your local CRISIS HOTLINE 186-3848 or 638-069-1250  · Call your local Children's Mobile Crisis Response Team [place] (537) 871-7895 or www.ScribbleLive  · Call the toll free National Suicide Prevention Hotlines   · National Suicide Prevention Lifeline 842-454-ZCRY (6655)  · National Hope Line Network 800-SUICIDE (662-3439)    Substance Use Disorder  Substance use disorder occurs when a person's repeated use of drugs or alcohol interferes with his or her ability to be productive. This disorder can cause problems with mental and physical health. It can affect your ability to have healthy relationships, and it can keep you from being able to meet your responsibilities at work, home, or school. It can also lead to addiction, which is a condition in which the person cannot stop using the substance consistently for a period of time.  Addiction changes the way the brain works. Because of these changes, addiction is a chronic condition. Substance use disorder can be mild, moderate, or severe.  The most commonly abused substances include:  · Alcohol.  · Tobacco.  · Marijuana.  · Stimulants, such as cocaine and methamphetamine.  · Hallucinogens, such as LSD and PCP.  · Opioids, such as some prescription pain medicines and heroin.  What are the causes?  This condition may develop due to many complex social, psychological, or physical reasons, such as:  · Stress.  · Abuse.  · Peer pressure.  · Anxiety or depression.  What increases the risk?  This condition is more likely to develop in people who:  · Use substances to cope with stress.  · Have been abused.  · Have a mental health disorder, such as depression.  · Have a family history of substance use disorder.  What are the signs or symptoms?  Symptoms of this condition include:  · Using the substance for longer periods of time or at a higher dosage than what is normal or  intended.  · Having a lasting desire to use the substance.  · Being unable to slow down or stop the use of the substance.  · Spending an abnormal amount of time getting the substance, using the substance, or recovering from using the substance.  · Using the substance in a way that interferes with work, school, social activities, and personal relationships.  · Using the substance even after having negative consequences, such as:  ? Health problems.  ? Legal or financial troubles.  ? Job loss.  ? Relationship problems.  · Needing more and more of the substance to get the same effect (developing tolerance).  · Experiencing unpleasant symptoms if you do not use the substance (withdrawal).  · Using the substance to avoid withdrawal symptoms.  How is this diagnosed?  This condition may be diagnosed based on:  · A physical exam.  · Your history of substance use.  · Your symptoms. This includes:  ? How substance use affects your life.  ? Changes in personality, behaviors, and mood.  ? Having at least two symptoms of substance use disorder within a 12-month period.  ? Health issues related to substance use, such as liver damage, shortness of breath, fatigue, cough, or heart problems.  · Blood or urine tests to screen for alcohol and drugs.  How is this treated?  This condition may be treated by:  · Stopping substance use safely. This may require taking medicines and being closely monitored for several days.  · Taking part in group and individual counseling from mental health providers who help people with substance use disorder.  · Staying at a live-in (residential) treatment center for several days or weeks.  · Attending daily counseling sessions at a treatment center.  · Taking medicine as told by your health care provider:  ? To ease symptoms and prevent complications during withdrawal.  ? To treat other mental health issues, such as depression or anxiety.  ? To block cravings by causing the same effects as the  substance.  ? To block the effects of the substance or replace good sensations with unpleasant ones.  · Participating in a support group to share your experience with others who are going through the same thing. These groups are an important part of long-term recovery for many people.  Recovery can be a long process. Many people who undergo treatment start using the substance again after stopping (relapse). If you relapse, that does not mean that treatment will not work.  Follow these instructions at home:    · Take over-the-counter and prescription medicines only as told by your health care provider.  · Do not use any drugs or alcohol.  · Avoid temptations or triggers that you associate with your use of the substance.  · Learn and practice techniques for managing stress.  · Have a plan for vulnerable moments. Get phone numbers of people who are willing to help and who are committed to your recovery.  · Attend support groups on a regular basis. These groups include 12-step programs like Alcoholics Anonymous and Narcotics Anonymous.  · Keep all follow-up visits as told by your health care providers. This is important. This includes continuing to work with therapists and support groups.  Contact a health care provider if:  · You cannot take your medicines as told.  · Your symptoms get worse.  · You have trouble resisting the urge to use drugs or alcohol.  Get help right away if you:  · Relapse.  · Think that you may have taken too much of a drug. The hotline of the National Poison Control Center is (454) 854-5423.  · Have signs of an overdose. Symptoms include:  ? Chest pain.  ? Confusion.  ? Sleepiness or difficulty staying awake.  ? Slowed breathing.  ? Nausea or vomiting.  ? A seizure.  · Have serious thoughts about hurting yourself or someone else.  Drug overdose is an emergency. Do not wait to see if the symptoms will go away. Get medical help right away. Call your local emergency services (911 in the [place] Do  not drive yourself to the hospital.  If you ever feel like you may hurt yourself or others, or have thoughts about taking your own life, get help right away. You can go to your nearest emergency department or call:  · Your local emergency services (911 in the [place]  · A suicide crisis helpline, such as the National Suicide Prevention Lifeline at 1-729.105.5792. This is open 24 hours a day.  Summary  · Substance use disorder occurs when a person's repeated use of drugs or alcohol interferes with his or her ability to be productive.  · Taking part in group and individual counseling from mental health providers is a common treatment for people with substance use disorder.  · Recovery can be a long process. Many people who undergo treatment start using the substance again after stopping (relapse). A relapse does not mean that treatment will not work.  · Attend support groups such as Alcoholics Anonymous and Narcotics Anonymous. These groups are an important part of long-term recovery for many people.  This information is not intended to replace advice given to you by your health care provider. Make sure you discuss any questions you have with your health care provider.  Document Released: 08/09/2006 Document Revised: 04/09/2020 Document Reviewed: 01/29/2019  [employer] Patient Education © 2020 [employer]    Pericarditis  Pericarditis is an inflammation of the sac that surrounds the heart. This sac is known as the pericardium. Typically the pericardium contains a smooth lubricating lining. When you have pericarditis, the lining is more like sandpaper.  CAUSES   · Viral or bacterial infections.   · Heart disease.   · Heart surgery.   · Reaction to a drug.   · Kidney failure.   · Thyroid problems.   · Arthritis.   · Cancer.   SYMPTOMS   · Sharp chest pain under the breast bone (sternum).   · Pain may also be felt in the neck, back or arms.   · Pain may feel worse if you lean forward, take a deep breath, or lie down.    · Shortness of breath.   · Fever.   · Pain on swallowing.   DIAGNOSIS   The diagnosis of pericarditis is based on your history,exam findings, and tests. These may include an EKG, chest x-ray, CT studies, blood tests, and echocardiogram.   TREATMENT   Treatment for pericarditis includes medicine for discomfort. An antibiotic may also be needed. With proper treatment, most episodes of pericarditis get better without complications.  HOME CARE INSTRUCTIONS   · Get plenty of rest.   · Avoid activities that increase your pain.   · Do not smoke or drink alcohol.   · Be sure to see your caregiver as recommended for follow-up to make sure your condition resolves completely.   SEEK IMMEDIATE MEDICAL CARE IF:  · You develop pain or shortness of breath that is getting worse.   · You develop a high fever and severe abdominal or back pain.   · You develop marked weakness, fainting, or any other serious complaint.   Document Released: 01/25/2006 Document Revised: 03/11/2013 Document Reviewed: 03/16/2009  [employer]® Patient Information ©2013 [employer].  Psychosis  Psychosis, also called thought disturbance, refers to a severe loss of contact with reality. People having a psychotic episode are not able to think clearly, and their emotions and responses do not match with what is actually happening. People having a psychotic episode may have false beliefs about what is happening or who they are (delusions). They may see, hear, taste, smell, or feel things that are not present (hallucinations). They may also be very upset (agitated), have chaotic behavior, or be very quiet and withdrawn.  What are the causes?  This condition may be caused by:  · Very serious mental health (psychiatric) conditions such as schizophrenia, bipolar disorder, or major depression.  · Use of drugs such as hallucinogens or alcohol.  · Medical conditions such as delirium or neurological disorders.  What are the signs or symptoms?  Symptoms of this condition  include:  · Delusions, such as:  ? Feeling a lot of fear or suspicion (paranoia).  ? Believing something that is odd, unrealistic, or false, such as believing that you are someone else.  · Hallucinations, such as:  ? Hearing or seeing things, smelling odors, experiencing tastes, or feeling bodily sensations.  ? Command hallucinations that direct you to do something that could be dangerous.  · Disorganized thinking, such as thoughts that jump from one idea to another in a way that does not make sense.  · Disorganized speech, such as saying things that do not make sense, echoing others, or using words based on their sound rather than their meaning.  · Inappropriate behavior, such as talking to yourself, showing a clear increase or decrease in activity, or intruding on unfamiliar people.  How is this diagnosed?  This condition is diagnosed based on an assessment by a health care provider.  · The health care provider may ask questions about:  ? Your thoughts, feelings, and behavior.  ? Any medical conditions you have.  ? Any use of alcohol or drugs.  · One or more of the following may also be done:  ? A physical exam.  ? Blood tests.  ? Brain imaging, such as a CT scan or MRI.  ? A brain wave study (electroencephalogram, or EEG).  The health care provider may refer you to a mental health professional for further tests.  How is this treated?  Treatment for this condition may depend on the cause of the psychosis. Treatment may include one or more of the following:  · Supportive care and monitoring in the emergency room or hospital. You may need to stay in the hospital if you are a danger to yourself or others.  · Taking antipsychotic medicines to reduce symptoms and to balance chemicals in the brain.  · Treating an underlying medical condition.  · Stopping or reducing drugs that are causing psychosis.  · Therapy and other supportive programs, such as:  ? Ongoing treatment and care from a mental health  professional.  ? Individual or family therapy.  ? Training to learn new skills to cope with the psychosis and prevent further episodes.  Follow these instructions at home:  · Take over-the-counter and prescription medicines only as told by your health care provider.  · Consult a health care provider before taking over-the-counter medicines, herbs, or supplements.  · Surround yourself with people who care about you and can help manage your condition.  · Keep stress under control. Stress may trigger psychosis and make symptoms worse.  · Maintain a healthy lifestyle. This includes:  ? Eating a healthy diet.  ? Getting enough sleep.  ? Exercising regularly.  ? Avoiding alcohol, nicotine, and recreational drugs.  · Keep all follow-up visits as told by your health care provider. This is important.  Contact a health care provider if:  · Medicines do not seem to be helping.  · You or others notice that you:  ? Continue to see, smell, or feel things that are not there.  ? Hear voices telling you to do things.  ? Feel extremely fearful and suspicious that someone or something will harm you.  ? Feel unable to leave your house.  ? Have trouble taking care of yourself.  · You have side effects of medicines, such as:  ? Changes in sleep patterns.  ? Dizziness.  ? Weight gain.  ? Restlessness.  ? Movement changes.  ? Shaking that you cannot control (tremors).  Get help right away if:  · You have serious side effects of medicine, such as:  ? Swelling of the face, lips, tongue, or throat.  ? Fever, confusion, muscle spasms, or seizures.  · You have serious thoughts about harming yourself or hurting others.  If you ever feel like you may hurt yourself or others, or have thoughts about taking your own life, get help right away. You can go to your nearest emergency department or call:  · Your local emergency services (911 in the [place]   · A suicide crisis helpline, such as the National Suicide Prevention Lifeline at 1-561.131.8658.  This is open 24 hours a day.  Summary  · Psychosis refers to a severe loss of contact with reality. People having a psychotic episode are not able to think clearly, and they may have delusions or hallucinations.  · Psychosis is a serious medical condition that should be treated by a medical professional as soon as possible. Being checked and treated right away can stop or reduce symptoms. This prevents more serious problems from developing.  · In some cases, treatment may include taking antipsychotic medicines to reduce symptoms and to balance chemicals in the brain.  · Support programs may help you learn new skills to cope with the psychosis and prevent further episodes.  This information is not intended to replace advice given to you by your health care provider. Make sure you discuss any questions you have with your health care provider.  Document Released: 06/07/2011 Document Revised: 03/01/2019 Document Reviewed: 01/29/2019  Elsevier Patient Education © 2020 [employer]    Acute Kidney Injury, Adult    Acute kidney injury is a sudden worsening of kidney function. The kidneys are organs that have several jobs. They filter the blood to remove waste products and extra fluid. They also maintain a healthy balance of minerals and hormones in the body, which helps control blood pressure and keep bones strong. With this condition, your kidneys do not do their jobs as well as they should.  This condition ranges from mild to severe. Over time it may develop into long-lasting (chronic) kidney disease. Early detection and treatment may prevent acute kidney injury from developing into a chronic condition.  What are the causes?  Common causes of this condition include:  · A problem with blood flow to the kidneys. This may be caused by:  ? Low blood pressure (hypotension) or shock.  ? Blood loss.  ? Heart and blood vessel (cardiovascular) disease.  ? Severe burns.  ? Liver disease.  · Direct damage to the kidneys. This may  be caused by:  ? Certain medicines.  ? A kidney infection.  ? Poisoning.  ? Being around or in contact with toxic substances.  ? A surgical wound.  ? A hard, direct hit to the kidney area.  · A sudden blockage of urine flow. This may be caused by:  ? Cancer.  ? Kidney stones.  ? An enlarged prostate in males.  What are the signs or symptoms?  Symptoms of this condition may not be obvious until the condition becomes severe. Symptoms of this condition can include:  · Tiredness (lethargy), or difficulty staying awake.  · Nausea or vomiting.  · Swelling (edema) of the face, legs, ankles, or feet.  · Problems with urination, such as:  ? Abdominal pain, or pain along the side of your stomach (flank).  ? Decreased urine production.  ? Decrease in the force of urine flow.  · Muscle twitches and cramps, especially in the legs.  · Confusion or trouble concentrating.  · Loss of appetite.  · Fever.  How is this diagnosed?  This condition may be diagnosed with tests, including:  · Blood tests.  · Urine tests.  · Imaging tests.  · A test in which a sample of tissue is removed from the kidneys to be examined under a microscope (kidney biopsy).  How is this treated?  Treatment for this condition depends on the cause and how severe the condition is. In mild cases, treatment may not be needed. The kidneys may heal on their own. In more severe cases, treatment will involve:  · Treating the cause of the kidney injury. This may involve changing any medicines you are taking or adjusting your dosage.  · Fluids. You may need specialized IV fluids to balance your body's needs.  · Having a catheter placed to drain urine and prevent blockages.  · Preventing problems from occurring. This may mean avoiding certain medicines or procedures that can cause further injury to the kidneys.  In some cases treatment may also require:  · A procedure to remove toxic wastes from the body (dialysis or continuous renal replacement therapy -  CRRT).  · Surgery. This may be done to repair a torn kidney, or to remove the blockage from the urinary system.  Follow these instructions at home:  Medicines  · Take over-the-counter and prescription medicines only as told by your health care provider.  · Do not take any new medicines without your health care provider's approval. Many medicines can worsen your kidney damage.  · Do not take any vitamin and mineral supplements without your health care provider's approval. Many nutritional supplements can worsen your kidney damage.  Lifestyle  · If your health care provider prescribed changes to your diet, follow them. You may need to decrease the amount of protein you eat.  · Achieve and maintain a healthy weight. If you need help with this, ask your health care provider.  · Start or continue an exercise plan. Try to exercise at least 30 minutes a day, 5 days a week.  · Do not use any tobacco products, such as cigarettes, chewing tobacco, and e-cigarettes. If you need help quitting, ask your health care provider.  General instructions  · Keep track of your blood pressure. Report changes in your blood pressure as told by your health care provider.  · Stay up to date with immunizations. Ask your health care provider which immunizations you need.  · Keep all follow-up visits as told by your health care provider. This is important.  Where to find more information  · American Association of Kidney Patients: www.aakp.org  · National Kidney Foundation: www.kidney.org  · American Kidney Fund: www.akfinc.org  · Life Options Rehabilitation Program:  ? www.lifeoptions.org  ? www.kidneyschool.org  Contact a health care provider if:  · Your symptoms get worse.  · You develop new symptoms.  Get help right away if:  · You develop symptoms of worsening kidney disease, which include:  ? Headaches.  ? Abnormally dark or light skin.  ? Easy bruising.  ? Frequent hiccups.  ? Chest pain.  ? Shortness of breath.  ? End of menstruation in  women.  ? Seizures.  ? Confusion or altered mental status.  ? Abdominal or back pain.  ? Itchiness.  · You have a fever.  · Your body is producing less urine.  · You have pain or bleeding when you urinate.  Summary  · Acute kidney injury is a sudden worsening of kidney function.  · Acute kidney injury can be caused by problems with blood flow to the kidneys, direct damage to the kidneys, and sudden blockage of urine flow.  · Symptoms of this condition may not be obvious until it becomes severe. Symptoms may include edema, lethargy, confusion, nausea or vomiting, and problems passing urine.  · This condition can usually be diagnosed with blood tests, urine tests, and imaging tests. Sometimes a kidney biopsy is done to diagnose this condition.  · Treatment for this condition often involves treating the underlying cause. It is treated with fluids, medicines, dialysis, diet changes, or surgery.  This information is not intended to replace advice given to you by your health care provider. Make sure you discuss any questions you have with your health care provider.  Document Released: 07/02/2012 Document Revised: 11/30/2018 Document Reviewed: 12/08/2017  [email or web address] Patient Education © 2020 [employer]

## 2022-04-04 NOTE — PROGRESS NOTES
Discharge paperwork discussed with the patient, no PIV in place. Pt requested medical records be sent to his mother, release of medical records given to the patient and filled out by the patient. Completed form faxed to medical records, pt made aware that release of medical records will take a few days, patient verbalized understanding. Patient provided copy of paperwork and signed copy scanned into [employer].

## 2022-04-05 NOTE — DOCUMENTATION QUERY
[facility]                                                                       Query Response Note      PATIENT:               JADEN CERVANTES  ACCT #:                  [id number]  MRN:                     [id number]  :                      2000  ADMIT DATE:       2022 7:14 PM  DISCH DATE:        2022 9:57 AM  RESPONDING  PROVIDER #:        [id number]           QUERY TEXT:    Acute encephalopathy and Psychoactive substance-induced psychosis are documented in the Medical Record.  Can a more specific diagnosis be provided?    NOTE:  If the appropriate response is not listed below, please respond with a new note.    The patient's Clinical Indicators include:  4/3/22 Cardiology MD consult: admitted 2022 with acute encephalopathy, aggressive behavior, on psychiatric hold.  4/3/22 Psychiatry consult: Psychosis, Methamphetamine use  DC Summary:  Psychoactive substance-induced psychosis    Treatment: Psychiatry consult, Versed, Benadryl, Haldol IV, lorazepam IV, NS IVF bolus  Risk Factors: Methamphetamine/cocaine overdose     Thank you,  Felix Lamb RN, BSN  Clinical   Connect via [employer]  .  Options provided:   -- Acute toxic encephalopathy with Psychoactive substance-induced psychosis   -- Psychoactive substance-induced psychosis only   -- Other explanation for clinical findings, please clarify   -- Unable to determine      Query created by: Felix Lamb on 2022 3:22 PM    RESPONSE TEXT:    Acute toxic encephalopathy with Psychoactive substance-induced psychosis          Electronically signed by:  MARIA TERESA POPE MD 2022 11:16 AM

## 2022-04-08 LAB
BACTERIA BLD CULT: NORMAL
BACTERIA BLD CULT: NORMAL
SIGNIFICANT IND 70042: NORMAL
SIGNIFICANT IND 70042: NORMAL
SITE SITE: NORMAL
SITE SITE: NORMAL
SOURCE SOURCE: NORMAL
SOURCE SOURCE: NORMAL

## 2022-04-29 ENCOUNTER — HOSPITAL ENCOUNTER (EMERGENCY)
Facility: MEDICAL CENTER | Age: 22
End: 2022-04-29
Attending: EMERGENCY MEDICINE
Payer: COMMERCIAL

## 2022-04-29 VITALS
WEIGHT: 160 LBS | OXYGEN SATURATION: 99 % | HEIGHT: 71 IN | HEART RATE: 80 BPM | TEMPERATURE: 98.6 F | DIASTOLIC BLOOD PRESSURE: 70 MMHG | BODY MASS INDEX: 22.4 KG/M2 | RESPIRATION RATE: 18 BRPM | SYSTOLIC BLOOD PRESSURE: 120 MMHG

## 2022-04-29 DIAGNOSIS — F29 PSYCHOSIS, UNSPECIFIED PSYCHOSIS TYPE (HCC): ICD-10-CM

## 2022-04-29 DIAGNOSIS — R00.0 TACHYCARDIA: ICD-10-CM

## 2022-04-29 DIAGNOSIS — F15.10 METHAMPHETAMINE USE (HCC): ICD-10-CM

## 2022-04-29 LAB
AMPHET UR QL SCN: POSITIVE
ANION GAP SERPL CALC-SCNC: 14 MMOL/L (ref 7–16)
BARBITURATES UR QL SCN: NEGATIVE
BASOPHILS # BLD AUTO: 0.2 % (ref 0–1.8)
BASOPHILS # BLD: 0.02 K/UL (ref 0–0.12)
BENZODIAZ UR QL SCN: NEGATIVE
BUN SERPL-MCNC: 16 MG/DL (ref 8–22)
BZE UR QL SCN: NEGATIVE
CALCIUM SERPL-MCNC: 9.4 MG/DL (ref 8.5–10.5)
CANNABINOIDS UR QL SCN: POSITIVE
CHLORIDE SERPL-SCNC: 105 MMOL/L (ref 96–112)
CO2 SERPL-SCNC: 20 MMOL/L (ref 20–33)
CREAT SERPL-MCNC: 0.78 MG/DL (ref 0.5–1.4)
EKG IMPRESSION: NORMAL
EOSINOPHIL # BLD AUTO: 0 K/UL (ref 0–0.51)
EOSINOPHIL NFR BLD: 0 % (ref 0–6.9)
ERYTHROCYTE [DISTWIDTH] IN BLOOD BY AUTOMATED COUNT: 42.5 FL (ref 35.9–50)
ETHANOL BLD-MCNC: 15 MG/DL (ref 0–10)
GFR SERPLBLD CREATININE-BSD FMLA CKD-EPI: 130 ML/MIN/1.73 M 2
GLUCOSE SERPL-MCNC: 83 MG/DL (ref 65–99)
HCT VFR BLD AUTO: 43 % (ref 42–52)
HGB BLD-MCNC: 15.3 G/DL (ref 14–18)
IMM GRANULOCYTES # BLD AUTO: 0.03 K/UL (ref 0–0.11)
IMM GRANULOCYTES NFR BLD AUTO: 0.3 % (ref 0–0.9)
LYMPHOCYTES # BLD AUTO: 2.25 K/UL (ref 1–4.8)
LYMPHOCYTES NFR BLD: 22.2 % (ref 22–41)
MAGNESIUM SERPL-MCNC: 1.9 MG/DL (ref 1.5–2.5)
MCH RBC QN AUTO: 31.9 PG (ref 27–33)
MCHC RBC AUTO-ENTMCNC: 35.6 G/DL (ref 33.7–35.3)
MCV RBC AUTO: 89.8 FL (ref 81.4–97.8)
METHADONE UR QL SCN: NEGATIVE
MONOCYTES # BLD AUTO: 0.84 K/UL (ref 0–0.85)
MONOCYTES NFR BLD AUTO: 8.3 % (ref 0–13.4)
NEUTROPHILS # BLD AUTO: 7 K/UL (ref 1.82–7.42)
NEUTROPHILS NFR BLD: 69 % (ref 44–72)
NRBC # BLD AUTO: 0 K/UL
NRBC BLD-RTO: 0 /100 WBC
OPIATES UR QL SCN: NEGATIVE
OXYCODONE UR QL SCN: NEGATIVE
PCP UR QL SCN: NEGATIVE
PLATELET # BLD AUTO: 262 K/UL (ref 164–446)
PMV BLD AUTO: 9.3 FL (ref 9–12.9)
POTASSIUM SERPL-SCNC: 3.5 MMOL/L (ref 3.6–5.5)
PROPOXYPH UR QL SCN: NEGATIVE
RBC # BLD AUTO: 4.79 M/UL (ref 4.7–6.1)
SODIUM SERPL-SCNC: 139 MMOL/L (ref 135–145)
T4 FREE SERPL-MCNC: 1.88 NG/DL (ref 0.93–1.7)
TSH SERPL DL<=0.005 MIU/L-ACNC: 2 UIU/ML (ref 0.38–5.33)
WBC # BLD AUTO: 10.1 K/UL (ref 4.8–10.8)

## 2022-04-29 PROCEDURE — 96374 THER/PROPH/DIAG INJ IV PUSH: CPT

## 2022-04-29 PROCEDURE — 84439 ASSAY OF FREE THYROXINE: CPT

## 2022-04-29 PROCEDURE — 80307 DRUG TEST PRSMV CHEM ANLYZR: CPT

## 2022-04-29 PROCEDURE — 700105 HCHG RX REV CODE 258: Performed by: EMERGENCY MEDICINE

## 2022-04-29 PROCEDURE — 96375 TX/PRO/DX INJ NEW DRUG ADDON: CPT

## 2022-04-29 PROCEDURE — 99285 EMERGENCY DEPT VISIT HI MDM: CPT

## 2022-04-29 PROCEDURE — 93005 ELECTROCARDIOGRAM TRACING: CPT | Performed by: EMERGENCY MEDICINE

## 2022-04-29 PROCEDURE — 96376 TX/PRO/DX INJ SAME DRUG ADON: CPT

## 2022-04-29 PROCEDURE — 84443 ASSAY THYROID STIM HORMONE: CPT

## 2022-04-29 PROCEDURE — 36415 COLL VENOUS BLD VENIPUNCTURE: CPT

## 2022-04-29 PROCEDURE — 700111 HCHG RX REV CODE 636 W/ 250 OVERRIDE (IP): Performed by: EMERGENCY MEDICINE

## 2022-04-29 PROCEDURE — 82077 ASSAY SPEC XCP UR&BREATH IA: CPT

## 2022-04-29 PROCEDURE — 83735 ASSAY OF MAGNESIUM: CPT

## 2022-04-29 PROCEDURE — 85025 COMPLETE CBC W/AUTO DIFF WBC: CPT

## 2022-04-29 PROCEDURE — 80048 BASIC METABOLIC PNL TOTAL CA: CPT

## 2022-04-29 RX ORDER — SODIUM CHLORIDE 9 MG/ML
1000 INJECTION, SOLUTION INTRAVENOUS ONCE
Status: COMPLETED | OUTPATIENT
Start: 2022-04-29 | End: 2022-04-29

## 2022-04-29 RX ORDER — LORAZEPAM 2 MG/ML
1 INJECTION INTRAMUSCULAR ONCE
Status: COMPLETED | OUTPATIENT
Start: 2022-04-29 | End: 2022-04-29

## 2022-04-29 RX ORDER — LORAZEPAM 2 MG/ML
0.5 INJECTION INTRAMUSCULAR ONCE
Status: COMPLETED | OUTPATIENT
Start: 2022-04-29 | End: 2022-04-29

## 2022-04-29 RX ORDER — DIPHENHYDRAMINE HYDROCHLORIDE 50 MG/ML
25 INJECTION INTRAMUSCULAR; INTRAVENOUS ONCE
Status: DISCONTINUED | OUTPATIENT
Start: 2022-04-29 | End: 2022-04-30 | Stop reason: HOSPADM

## 2022-04-29 RX ORDER — HALOPERIDOL 5 MG/ML
5 INJECTION INTRAMUSCULAR ONCE
Status: COMPLETED | OUTPATIENT
Start: 2022-04-29 | End: 2022-04-29

## 2022-04-29 RX ADMIN — HALOPERIDOL LACTATE 5 MG: 5 INJECTION, SOLUTION INTRAMUSCULAR at 14:37

## 2022-04-29 RX ADMIN — SODIUM CHLORIDE 1000 ML: 9 INJECTION, SOLUTION INTRAVENOUS at 10:47

## 2022-04-29 RX ADMIN — LORAZEPAM 1 MG: 2 INJECTION INTRAMUSCULAR; INTRAVENOUS at 11:33

## 2022-04-29 RX ADMIN — LORAZEPAM 0.5 MG: 2 INJECTION INTRAMUSCULAR; INTRAVENOUS at 10:44

## 2022-04-29 NOTE — ED NOTES
"Pt getting out of bed and is requesting to \"walk around\".   Pt informed that he is on a legal hold at this time and is unable to leave.Pt asked to return to [facility]   "

## 2022-04-29 NOTE — ED TRIAGE NOTES
"Chief Complaint   Patient presents with   • Medical Clearance   • Tachycardia   • Drug Ingestion     [facility]- pt in custody. Pt was found by PD dancing on [address] in traffic and was taken into custody. Per PD, pt was found with meth pipe and + drugs on his person. Pt was sent here from police intake d/t reported tachycardia in the 170's to 190's. Pt HR is in the 150's on arrival. Pt had 500ml NS PTA and otherwise no interventions.     /69   Pulse (!) 150   Resp 18   Ht 1.803 m (5' 11\")   Wt 72.6 kg (160 lb)   SpO2 98%   BMI 22.32 kg/m²     "

## 2022-04-29 NOTE — ED NOTES
"Pt getting out of bed and is requesting to \"walk around\".   Pt informed that he is on a legal hold at this time and is unable to leave.Pt asked to return to [facility]  "

## 2022-04-29 NOTE — ED NOTES
VSS, NAD, sitter at bedside. Pt still rambling and appears to be responding to external stimuli. ERP aware.   Pt has no other requests at this time

## 2022-04-29 NOTE — ED NOTES
Pt resting in gurney and still unable to answer questions. Pt continues to fidget with clothing  UA/UDS collected and sent to the lab

## 2022-04-29 NOTE — ED PROVIDER NOTES
"ED Provider Note    CHIEF COMPLAINT  Chief Complaint   Patient presents with   • Medical Clearance   • Tachycardia   • Drug Ingestion       HPI  Thomas James Dancer V is a 21 y.o. male who presents for medical clearance for incarceration.  Patient was found by PD dancing in traffic and taken into custody.  There was a meth pipe and drugs in his possession.  Police brought him for evaluation given tachycardia in the 170s to 190s.  Patient received normal saline bolus of 500 mL prior to arrival.    Patient offers no complaints or history.      ALLERGIES  No Known Allergies    CURRENT MEDICATIONS  Unable to obtain given patient's altered mental state    PAST MEDICAL HISTORY     Unknown    SURGICAL HISTORY  patient denies any surgical history    SOCIAL HISTORY  Social History     Tobacco Use   • Smoking status: Unknown If Ever Smoked   • Smokeless tobacco: Not on file   Substance and Sexual Activity   • Alcohol use: Not on file   • Drug use: Yes     Comment: Pt came in for meth use    • Sexual activity: Not on file       Family Hx:  Unable to obtain given patient's altered mental state      REVIEW OF SYSTEMS  Unable to obtain given patient's altered mental state    PHYSICAL EXAM  VITAL SIGNS: BP (P) 101/53   Pulse (P) 92   Temp 36.9 °C (98.5 °F) (Temporal)   Resp 18   Ht 1.803 m (5' 11\")   Wt 72.6 kg (160 lb)   SpO2 (P) 97%   BMI 22.32 kg/m²     General:  WDWN male, nontoxic but anxious appearing in NAD; alert; answers questions occasionally, stating \"I am fixing my pants.\"  V/S as above; tachycardic, afebrile, not hypoxic or hypotensive  Skin: warm and dry; good color; no rash  HEENT: NCAT; EOMs intact; PERRL; no scleral icterus   Neck: FROM; soft  Cardiovascular: Tachycardic heart rate and regular rhythm.  No murmurs, rubs, or gallops; pulses 2+ bilaterally radially and DP areas  Lungs: Clear to auscultation with good air movement bilaterally.  No wheezes, rhonchi, or rales.   Abdomen: BS present; soft; NTND; " no rebound, guarding, or rigidity.  No organomegaly or pulsatile mass; no CVAT   Extremities: STERLING x 4; no e/o trauma; no pedal edema; neg Gina's  Neurologic: CNs III-XII grossly intact; speech clear; distal sensation intact; strength 5/5 UE/LEs  Psychiatric: Appropriate affect, normal mood    LABS  Results for orders placed or performed during the hospital encounter of 04/29/22   URINE DRUG SCREEN   Result Value Ref Range    Amphetamines Urine Positive (A) Negative    Barbiturates Negative Negative    Benzodiazepines Negative Negative    Cocaine Metabolite Negative Negative    Methadone Negative Negative    Opiates Negative Negative    Oxycodone Negative Negative    Phencyclidine -Pcp Negative Negative    Propoxyphene Negative Negative    Cannabinoid Metab Positive (A) Negative   DIAGNOSTIC ALCOHOL   Result Value Ref Range    Diagnostic Alcohol 15.0 (H) 0.0 - 10.0 mg/dL   CBC WITH DIFFERENTIAL   Result Value Ref Range    WBC 10.1 4.8 - 10.8 K/uL    RBC 4.79 4.70 - 6.10 M/uL    Hemoglobin 15.3 14.0 - 18.0 g/dL    Hematocrit 43.0 42.0 - 52.0 %    MCV 89.8 81.4 - 97.8 fL    MCH 31.9 27.0 - 33.0 pg    MCHC 35.6 (H) 33.7 - 35.3 g/dL    RDW 42.5 35.9 - 50.0 fL    Platelet Count 262 164 - 446 K/uL    MPV 9.3 9.0 - 12.9 fL    Neutrophils-Polys 69.00 44.00 - 72.00 %    Lymphocytes 22.20 22.00 - 41.00 %    Monocytes 8.30 0.00 - 13.40 %    Eosinophils 0.00 0.00 - 6.90 %    Basophils 0.20 0.00 - 1.80 %    Immature Granulocytes 0.30 0.00 - 0.90 %    Nucleated RBC 0.00 /100 WBC    Neutrophils (Absolute) 7.00 1.82 - 7.42 K/uL    Lymphs (Absolute) 2.25 1.00 - 4.80 K/uL    Monos (Absolute) 0.84 0.00 - 0.85 K/uL    Eos (Absolute) 0.00 0.00 - 0.51 K/uL    Baso (Absolute) 0.02 0.00 - 0.12 K/uL    Immature Granulocytes (abs) 0.03 0.00 - 0.11 K/uL    NRBC (Absolute) 0.00 K/uL   Basic Metabolic Panel   Result Value Ref Range    Sodium 139 135 - 145 mmol/L    Potassium 3.5 (L) 3.6 - 5.5 mmol/L    Chloride 105 96 - 112 mmol/L    Co2 20 20  - 33 mmol/L    Glucose 83 65 - 99 mg/dL    Bun 16 8 - 22 mg/dL    Creatinine 0.78 0.50 - 1.40 mg/dL    Calcium 9.4 8.5 - 10.5 mg/dL    Anion Gap 14.0 7.0 - 16.0   TSH   Result Value Ref Range    TSH 2.000 0.380 - 5.330 uIU/mL   FREE THYROXINE   Result Value Ref Range    Free T-4 1.88 (H) 0.93 - 1.70 ng/dL   MAGNESIUM   Result Value Ref Range    Magnesium 1.9 1.5 - 2.5 mg/dL   ESTIMATED GFR   Result Value Ref Range    GFR (CKD-EPI) 130 >60 mL/min/1.73 m 2   EKG   Result Value Ref Range    Report       [facility] Emergency Dept.    Test Date:  2022  Pt Name:    THOMAS DANCER                Department: ER  MRN:        [id number]                      Room:        10  Gender:     Male                         Technician: [id number]  :        2000                   Requested By:ER TRIAGE PROTOCOL  Order #:    [id number]                    Reading MD: LEFTY ALVAREZ MD    Measurements  Intervals                                Axis  Rate:       154                          P:  AZ:                                      QRS:        102  QRSD:       92                           T:          41  QT:         300  QTc:        480    Interpretive Statements  INCOMPLETE ANALYSIS DUE TO MISSING DATA IN PRECORDIAL LEAD(S)  ATRIAL FIBRILLATION, V-RATE 119-155  PAIRED VENTRICULAR PREMATURE COMPLEXES  BORDERLINE RIGHT AXIS DEVIATION  BORDERLINE T ABNORMALITIES, ANT-LAT LEADS  BORDERLINE PROLONGED QT INTERVAL  ARTIFACT IN LEAD(S) II,aVR,aVF,V1,V2,V3,V5,V6  MISSING LEAD(S): V4   No previous ECG available for comparison  Electronically Signed On 2022 14:46:54 PDT by LEFTY ALVAREZ MD         MEDICAL RECORD  I have reviewed patient's medical record and pertinent results are listed below.      COURSE & MEDICAL DECISION MAKING  I have reviewed any medical record information, laboratory studies and radiographic results as noted.    Thomas James Dancer V is a 21 y.o. male who presents for evaluation of  tachycardia.    Appropriate PPE was worn at all times while interacting with the patient.    NS bolus was ordered for possible dehydration given patient's tachycardia.    Patient was given Ativan 0.5 mg IV.      EKG demonstrates no acute ST changes.  Sinus tachycardia noted.    Heart rate continued to be in the 120s.  Ativan 1 mg IV given.    Pt's HR normalized after fluid bolus    11:29 AM  Pt placed on ED OBS    Patient appears to be responding to internal stimuli.  Haldol and Benadryl ordered.    Patient continues to be tachycardic in the 130s.  Labs ordered.    4:11 PM  Labs reviewed.  Mild hypokalemia and an alcohol level of 15 with free T4 of 1.8.  I do not feel this patient is in thyroid storm.  He does not appear significantly dehydrated or anemic to explain his tachycardia.  This is all likely due to his methamphetamine use which was suspected and confirmed with UDS.  Patient will sober and reevaluated for ongoing psychosis.  I will sign patient out to Dr. Palmer.    IMPRESSION  1. Methamphetamine use (HCC)     2. Tachycardia     3. Psychosis, unspecified psychosis type (HCC)         Electronically signed by: Jamia Larsen M.D., 4/29/2022 10:58 AM

## 2022-04-29 NOTE — ED NOTES
Pt appears to be sleeping in the gurney. Pt VSS, NAD and sitter remains at bedside. Pt has no further requests at this time

## 2022-04-29 NOTE — ED NOTES
Pt medicated per MAR. Pt unable to provide UA at this time.   [facility] deputy states that she is dropping charges and pt is no longer is custody. Pratt no longer at bedside.

## 2022-04-29 NOTE — ED NOTES
Pt appears to be sleeping in mya, FRANNIE, NAD. Sitter at bedside.   Pt has no further requests at this time.

## 2022-04-30 NOTE — ED NOTES
Patient provided with discharge instructions. Education complete, all questions answered.   Lines removed, vitals stable. All personal belongings accounted for.   Patient ambulated out of the ER.

## 2022-04-30 NOTE — ED NOTES
Pt appears to be sleeping in [facility]. VSS, NAD, 1:1 PSA sitter remains at bedside.

## 2022-04-30 NOTE — ED NOTES
Med rec completed per patient at bedside.  Allergies reviewed with patient.  Patient denies using any prescription medications.  No vitamins/supplements.  No recent over-the-counter medications.  No outpatient antibiotics in the last 30 days.  Patient's preferred pharmacy: [facility].

## 2022-04-30 NOTE — DISCHARGE SUMMARY
"  ED Observation Discharge Summary    Patient:Thomas James Dancer V  Patient : 2000  Patient MRN: [id number]  Patient PCP: No primary care provider on file.    Admit Date: 2022  Discharge Date and Time: 22 10:32 PM  Discharge Diagnosis:   1. Methamphetamine use (HCC)    2. Tachycardia    3. Psychosis, unspecified psychosis type (HCC)      Discharge Attending: Moshe Palmer M.D.  Discharge Service: ED Observation    ED Course  Parminder is a 21 y.o. male who was evaluated at [facility] for altered mental status and psychosis.  Patient admits to using methamphetamine.  Patient mental status cleared after multiple hours in the emergency department.  She denies any suicidal homicidal ideations.  Patient does not display any evidence of ongoing psychosis.  He does not have any ongoing delusions.  He reports he feels safe if discharged.  I discussed drug cessation.    Discharge Exam:  /70   Pulse 80   Temp 37 °C (98.6 °F)   Resp 18   Ht 1.803 m (5' 11\")   Wt 72.6 kg (160 lb)   SpO2 99%   BMI 22.32 kg/m² .    Constitutional: Awake and alert. Nontoxic  HENT:  Grossly normal  Eyes: Grossly normal  Neck: Normal range of motion  Cardiovascular: Normal heart rate   Thorax & Lungs: No respiratory distress  Abdomen: Nontender  Skin:  No pathologic rash.   Extremities: Well perfused  Psychiatric: Affect normal    Labs  Results for orders placed or performed during the hospital encounter of 22   URINE DRUG SCREEN   Result Value Ref Range    Amphetamines Urine Positive (A) Negative    Barbiturates Negative Negative    Benzodiazepines Negative Negative    Cocaine Metabolite Negative Negative    Methadone Negative Negative    Opiates Negative Negative    Oxycodone Negative Negative    Phencyclidine -Pcp Negative Negative    Propoxyphene Negative Negative    Cannabinoid Metab Positive (A) Negative   DIAGNOSTIC ALCOHOL   Result Value Ref Range    Diagnostic Alcohol 15.0 (H) 0.0 - 10.0 mg/dL   CBC WITH " DIFFERENTIAL   Result Value Ref Range    WBC 10.1 4.8 - 10.8 K/uL    RBC 4.79 4.70 - 6.10 M/uL    Hemoglobin 15.3 14.0 - 18.0 g/dL    Hematocrit 43.0 42.0 - 52.0 %    MCV 89.8 81.4 - 97.8 fL    MCH 31.9 27.0 - 33.0 pg    MCHC 35.6 (H) 33.7 - 35.3 g/dL    RDW 42.5 35.9 - 50.0 fL    Platelet Count 262 164 - 446 K/uL    MPV 9.3 9.0 - 12.9 fL    Neutrophils-Polys 69.00 44.00 - 72.00 %    Lymphocytes 22.20 22.00 - 41.00 %    Monocytes 8.30 0.00 - 13.40 %    Eosinophils 0.00 0.00 - 6.90 %    Basophils 0.20 0.00 - 1.80 %    Immature Granulocytes 0.30 0.00 - 0.90 %    Nucleated RBC 0.00 /100 WBC    Neutrophils (Absolute) 7.00 1.82 - 7.42 K/uL    Lymphs (Absolute) 2.25 1.00 - 4.80 K/uL    Monos (Absolute) 0.84 0.00 - 0.85 K/uL    Eos (Absolute) 0.00 0.00 - 0.51 K/uL    Baso (Absolute) 0.02 0.00 - 0.12 K/uL    Immature Granulocytes (abs) 0.03 0.00 - 0.11 K/uL    NRBC (Absolute) 0.00 K/uL   Basic Metabolic Panel   Result Value Ref Range    Sodium 139 135 - 145 mmol/L    Potassium 3.5 (L) 3.6 - 5.5 mmol/L    Chloride 105 96 - 112 mmol/L    Co2 20 20 - 33 mmol/L    Glucose 83 65 - 99 mg/dL    Bun 16 8 - 22 mg/dL    Creatinine 0.78 0.50 - 1.40 mg/dL    Calcium 9.4 8.5 - 10.5 mg/dL    Anion Gap 14.0 7.0 - 16.0   TSH   Result Value Ref Range    TSH 2.000 0.380 - 5.330 uIU/mL   FREE THYROXINE   Result Value Ref Range    Free T-4 1.88 (H) 0.93 - 1.70 ng/dL   MAGNESIUM   Result Value Ref Range    Magnesium 1.9 1.5 - 2.5 mg/dL   ESTIMATED GFR   Result Value Ref Range    GFR (CKD-EPI) 130 >60 mL/min/1.73 m 2   EKG   Result Value Ref Range    Report       [facility] Emergency Dept.    Test Date:  2022  Pt Name:    THOMAS DANCER                Department: ER  MRN:        [id number]                      Room:        10  Gender:     Male                         Technician: [id number]  :        2000                   Requested By:ER TRIAGE PROTOCOL  Order #:    [id number]                    Reading MD: LEFTY DINERO  MD ANTONIO    Measurements  Intervals                                Axis  Rate:       154                          P:  AK:                                      QRS:        102  QRSD:       92                           T:          41  QT:         300  QTc:        480    Interpretive Statements  INCOMPLETE ANALYSIS DUE TO MISSING DATA IN PRECORDIAL LEAD(S)  ATRIAL FIBRILLATION, V-RATE 119-155  PAIRED VENTRICULAR PREMATURE COMPLEXES  BORDERLINE RIGHT AXIS DEVIATION  BORDERLINE T ABNORMALITIES, ANT-LAT LEADS  BORDERLINE PROLONGED QT INTERVAL  ARTIFACT IN LEAD(S) II,aVR,aVF,V1,V2,V3,V5,V6  MISSING LEAD(S): V4   No previous ECG available for comparison  Electronically Signed On 4- 14:46:54 PDT by LEFTY ALVAREZ MD         Radiology  No orders to display         Medications:   New Prescriptions    No medications on file       Discharge Condition: Stable    Electronically signed by: Moshe Palmer M.D., 4/29/2022 10:32 PM

## 2022-04-30 NOTE — ED NOTES
Received report from LANCE Menard. Assumed care of patient. Pt moved to [place] [id number] with tele sitter.

## 2022-04-30 NOTE — ED NOTES
Assumed care for this PT tele sitter at bedside and PT asleep VSS and in no distress at this time.

## 2022-06-10 ENCOUNTER — APPOINTMENT (OUTPATIENT)
Dept: RADIOLOGY | Facility: MEDICAL CENTER | Age: 22
DRG: 871 | End: 2022-06-10
Attending: EMERGENCY MEDICINE
Payer: COMMERCIAL

## 2022-06-10 ENCOUNTER — HOSPITAL ENCOUNTER (INPATIENT)
Facility: MEDICAL CENTER | Age: 22
LOS: 2 days | DRG: 871 | End: 2022-06-12
Attending: EMERGENCY MEDICINE | Admitting: STUDENT IN AN ORGANIZED HEALTH CARE EDUCATION/TRAINING PROGRAM
Payer: COMMERCIAL

## 2022-06-10 DIAGNOSIS — R41.82 ALTERED MENTAL STATUS, UNSPECIFIED ALTERED MENTAL STATUS TYPE: ICD-10-CM

## 2022-06-10 DIAGNOSIS — A41.9 SEPSIS, DUE TO UNSPECIFIED ORGANISM, UNSPECIFIED WHETHER ACUTE ORGAN DYSFUNCTION PRESENT (HCC): ICD-10-CM

## 2022-06-10 DIAGNOSIS — L03.116 CELLULITIS OF LEFT LOWER EXTREMITY: ICD-10-CM

## 2022-06-10 LAB
ALBUMIN SERPL BCP-MCNC: 4.8 G/DL (ref 3.2–4.9)
ALBUMIN/GLOB SERPL: 1.8 G/DL
ALP SERPL-CCNC: 57 U/L (ref 30–99)
ALT SERPL-CCNC: 11 U/L (ref 2–50)
AMPHET UR QL SCN: POSITIVE
ANION GAP SERPL CALC-SCNC: 19 MMOL/L (ref 7–16)
APPEARANCE UR: CLEAR
AST SERPL-CCNC: 26 U/L (ref 12–45)
BACTERIA #/AREA URNS HPF: NEGATIVE /HPF
BARBITURATES UR QL SCN: NEGATIVE
BASOPHILS # BLD AUTO: 0.2 % (ref 0–1.8)
BASOPHILS # BLD: 0.03 K/UL (ref 0–0.12)
BENZODIAZ UR QL SCN: NEGATIVE
BILIRUB SERPL-MCNC: 2.8 MG/DL (ref 0.1–1.5)
BILIRUB UR QL STRIP.AUTO: ABNORMAL
BUN SERPL-MCNC: 22 MG/DL (ref 8–22)
BZE UR QL SCN: NEGATIVE
CALCIUM SERPL-MCNC: 10.3 MG/DL (ref 8.5–10.5)
CANNABINOIDS UR QL SCN: NEGATIVE
CHLORIDE SERPL-SCNC: 103 MMOL/L (ref 96–112)
CK SERPL-CCNC: 205 U/L (ref 0–154)
CO2 SERPL-SCNC: 18 MMOL/L (ref 20–33)
COLOR UR: ABNORMAL
CREAT SERPL-MCNC: 1.35 MG/DL (ref 0.5–1.4)
EKG IMPRESSION: NORMAL
EOSINOPHIL # BLD AUTO: 0 K/UL (ref 0–0.51)
EOSINOPHIL NFR BLD: 0 % (ref 0–6.9)
EPI CELLS #/AREA URNS HPF: ABNORMAL /HPF
ERYTHROCYTE [DISTWIDTH] IN BLOOD BY AUTOMATED COUNT: 40.6 FL (ref 35.9–50)
ETHANOL BLD-MCNC: <10.1 MG/DL
GFR SERPLBLD CREATININE-BSD FMLA CKD-EPI: 76 ML/MIN/1.73 M 2
GLOBULIN SER CALC-MCNC: 2.6 G/DL (ref 1.9–3.5)
GLUCOSE SERPL-MCNC: 106 MG/DL (ref 65–99)
GLUCOSE UR STRIP.AUTO-MCNC: NEGATIVE MG/DL
HCT VFR BLD AUTO: 43 % (ref 42–52)
HGB BLD-MCNC: 15.5 G/DL (ref 14–18)
HYALINE CASTS #/AREA URNS LPF: ABNORMAL /LPF
IMM GRANULOCYTES # BLD AUTO: 0.09 K/UL (ref 0–0.11)
IMM GRANULOCYTES NFR BLD AUTO: 0.5 % (ref 0–0.9)
KETONES UR STRIP.AUTO-MCNC: 15 MG/DL
LACTATE BLD-SCNC: 1.7 MMOL/L (ref 0.5–2)
LEUKOCYTE ESTERASE UR QL STRIP.AUTO: NEGATIVE
LYMPHOCYTES # BLD AUTO: 1.67 K/UL (ref 1–4.8)
LYMPHOCYTES NFR BLD: 8.6 % (ref 22–41)
MCH RBC QN AUTO: 31.3 PG (ref 27–33)
MCHC RBC AUTO-ENTMCNC: 36 G/DL (ref 33.7–35.3)
MCV RBC AUTO: 86.7 FL (ref 81.4–97.8)
METHADONE UR QL SCN: NEGATIVE
MICRO URNS: ABNORMAL
MONOCYTES # BLD AUTO: 1.71 K/UL (ref 0–0.85)
MONOCYTES NFR BLD AUTO: 8.8 % (ref 0–13.4)
NEUTROPHILS # BLD AUTO: 15.88 K/UL (ref 1.82–7.42)
NEUTROPHILS NFR BLD: 81.9 % (ref 44–72)
NITRITE UR QL STRIP.AUTO: NEGATIVE
NRBC # BLD AUTO: 0 K/UL
NRBC BLD-RTO: 0 /100 WBC
OPIATES UR QL SCN: NEGATIVE
OXYCODONE UR QL SCN: NEGATIVE
PCP UR QL SCN: NEGATIVE
PH UR STRIP.AUTO: 7 [PH] (ref 5–8)
PLATELET # BLD AUTO: 246 K/UL (ref 164–446)
PMV BLD AUTO: 9.8 FL (ref 9–12.9)
POTASSIUM SERPL-SCNC: 5.2 MMOL/L (ref 3.6–5.5)
PROPOXYPH UR QL SCN: NEGATIVE
PROT SERPL-MCNC: 7.4 G/DL (ref 6–8.2)
PROT UR QL STRIP: 30 MG/DL
RBC # BLD AUTO: 4.96 M/UL (ref 4.7–6.1)
RBC # URNS HPF: ABNORMAL /HPF
RBC UR QL AUTO: NEGATIVE
SODIUM SERPL-SCNC: 140 MMOL/L (ref 135–145)
SP GR UR STRIP.AUTO: 1.03
UROBILINOGEN UR STRIP.AUTO-MCNC: 1 MG/DL
WBC # BLD AUTO: 19.4 K/UL (ref 4.8–10.8)
WBC #/AREA URNS HPF: ABNORMAL /HPF

## 2022-06-10 PROCEDURE — 770001 HCHG ROOM/CARE - MED/SURG/GYN PRIV*

## 2022-06-10 PROCEDURE — 96365 THER/PROPH/DIAG IV INF INIT: CPT

## 2022-06-10 PROCEDURE — 87040 BLOOD CULTURE FOR BACTERIA: CPT | Mod: 91

## 2022-06-10 PROCEDURE — 82550 ASSAY OF CK (CPK): CPT

## 2022-06-10 PROCEDURE — 99221 1ST HOSP IP/OBS SF/LOW 40: CPT | Performed by: STUDENT IN AN ORGANIZED HEALTH CARE EDUCATION/TRAINING PROGRAM

## 2022-06-10 PROCEDURE — 83605 ASSAY OF LACTIC ACID: CPT

## 2022-06-10 PROCEDURE — 81001 URINALYSIS AUTO W/SCOPE: CPT

## 2022-06-10 PROCEDURE — 700105 HCHG RX REV CODE 258: Performed by: EMERGENCY MEDICINE

## 2022-06-10 PROCEDURE — 700111 HCHG RX REV CODE 636 W/ 250 OVERRIDE (IP): Performed by: EMERGENCY MEDICINE

## 2022-06-10 PROCEDURE — 93005 ELECTROCARDIOGRAM TRACING: CPT | Performed by: EMERGENCY MEDICINE

## 2022-06-10 PROCEDURE — 93005 ELECTROCARDIOGRAM TRACING: CPT

## 2022-06-10 PROCEDURE — 82077 ASSAY SPEC XCP UR&BREATH IA: CPT

## 2022-06-10 PROCEDURE — 70450 CT HEAD/BRAIN W/O DYE: CPT

## 2022-06-10 PROCEDURE — 80053 COMPREHEN METABOLIC PANEL: CPT

## 2022-06-10 PROCEDURE — 96375 TX/PRO/DX INJ NEW DRUG ADDON: CPT

## 2022-06-10 PROCEDURE — 85025 COMPLETE CBC W/AUTO DIFF WBC: CPT

## 2022-06-10 PROCEDURE — 36415 COLL VENOUS BLD VENIPUNCTURE: CPT

## 2022-06-10 PROCEDURE — 99285 EMERGENCY DEPT VISIT HI MDM: CPT

## 2022-06-10 PROCEDURE — 700111 HCHG RX REV CODE 636 W/ 250 OVERRIDE (IP)

## 2022-06-10 PROCEDURE — 80307 DRUG TEST PRSMV CHEM ANLYZR: CPT

## 2022-06-10 RX ORDER — SODIUM CHLORIDE 9 MG/ML
INJECTION, SOLUTION INTRAVENOUS CONTINUOUS
Status: DISCONTINUED | OUTPATIENT
Start: 2022-06-11 | End: 2022-06-12 | Stop reason: HOSPADM

## 2022-06-10 RX ORDER — ACETAMINOPHEN 325 MG/1
650 TABLET ORAL EVERY 6 HOURS PRN
Status: DISCONTINUED | OUTPATIENT
Start: 2022-06-10 | End: 2022-06-12 | Stop reason: HOSPADM

## 2022-06-10 RX ORDER — CEFAZOLIN SODIUM 2 G/100ML
2 INJECTION, SOLUTION INTRAVENOUS EVERY 8 HOURS
Status: DISCONTINUED | OUTPATIENT
Start: 2022-06-11 | End: 2022-06-12

## 2022-06-10 RX ORDER — SODIUM CHLORIDE 9 MG/ML
1000 INJECTION, SOLUTION INTRAVENOUS ONCE
Status: COMPLETED | OUTPATIENT
Start: 2022-06-11 | End: 2022-06-11

## 2022-06-10 RX ORDER — AMOXICILLIN 250 MG
2 CAPSULE ORAL 2 TIMES DAILY
Status: DISCONTINUED | OUTPATIENT
Start: 2022-06-11 | End: 2022-06-12 | Stop reason: HOSPADM

## 2022-06-10 RX ORDER — POLYETHYLENE GLYCOL 3350 17 G/17G
1 POWDER, FOR SOLUTION ORAL
Status: DISCONTINUED | OUTPATIENT
Start: 2022-06-10 | End: 2022-06-12 | Stop reason: HOSPADM

## 2022-06-10 RX ORDER — CEFAZOLIN SODIUM 2 G/100ML
2000 INJECTION, SOLUTION INTRAVENOUS ONCE
Status: COMPLETED | OUTPATIENT
Start: 2022-06-10 | End: 2022-06-11

## 2022-06-10 RX ORDER — SODIUM CHLORIDE, SODIUM LACTATE, POTASSIUM CHLORIDE, CALCIUM CHLORIDE 600; 310; 30; 20 MG/100ML; MG/100ML; MG/100ML; MG/100ML
1000 INJECTION, SOLUTION INTRAVENOUS ONCE
Status: COMPLETED | OUTPATIENT
Start: 2022-06-10 | End: 2022-06-10

## 2022-06-10 RX ORDER — LORAZEPAM 2 MG/ML
2 INJECTION INTRAMUSCULAR ONCE
Status: COMPLETED | OUTPATIENT
Start: 2022-06-10 | End: 2022-06-10

## 2022-06-10 RX ORDER — LORAZEPAM 2 MG/ML
1 INJECTION INTRAMUSCULAR ONCE
Status: DISCONTINUED | OUTPATIENT
Start: 2022-06-10 | End: 2022-06-10

## 2022-06-10 RX ORDER — ENOXAPARIN SODIUM 100 MG/ML
40 INJECTION SUBCUTANEOUS DAILY
Status: DISCONTINUED | OUTPATIENT
Start: 2022-06-11 | End: 2022-06-12 | Stop reason: HOSPADM

## 2022-06-10 RX ORDER — HYDRALAZINE HYDROCHLORIDE 20 MG/ML
10 INJECTION INTRAMUSCULAR; INTRAVENOUS EVERY 4 HOURS PRN
Status: DISCONTINUED | OUTPATIENT
Start: 2022-06-10 | End: 2022-06-12 | Stop reason: HOSPADM

## 2022-06-10 RX ORDER — BISACODYL 10 MG
10 SUPPOSITORY, RECTAL RECTAL
Status: DISCONTINUED | OUTPATIENT
Start: 2022-06-10 | End: 2022-06-12 | Stop reason: HOSPADM

## 2022-06-10 RX ADMIN — LORAZEPAM 2 MG: 2 INJECTION INTRAMUSCULAR; INTRAVENOUS at 18:45

## 2022-06-10 RX ADMIN — CEFAZOLIN SODIUM 2000 MG: 2 INJECTION, SOLUTION INTRAVENOUS at 23:37

## 2022-06-10 RX ADMIN — SODIUM CHLORIDE, POTASSIUM CHLORIDE, SODIUM LACTATE AND CALCIUM CHLORIDE 1000 ML: 600; 310; 30; 20 INJECTION, SOLUTION INTRAVENOUS at 19:23

## 2022-06-11 ENCOUNTER — APPOINTMENT (OUTPATIENT)
Dept: RADIOLOGY | Facility: MEDICAL CENTER | Age: 22
DRG: 871 | End: 2022-06-11
Attending: EMERGENCY MEDICINE
Payer: COMMERCIAL

## 2022-06-11 PROBLEM — G92.8 TOXIC METABOLIC ENCEPHALOPATHY: Status: ACTIVE | Noted: 2022-06-11

## 2022-06-11 PROBLEM — F15.10 METHAMPHETAMINE USE (HCC): Status: ACTIVE | Noted: 2022-06-11

## 2022-06-11 PROBLEM — L03.116 CELLULITIS OF LEFT LOWER EXTREMITY: Status: ACTIVE | Noted: 2022-06-11

## 2022-06-11 LAB
ANION GAP SERPL CALC-SCNC: 10 MMOL/L (ref 7–16)
BUN SERPL-MCNC: 17 MG/DL (ref 8–22)
CALCIUM SERPL-MCNC: 8.6 MG/DL (ref 8.5–10.5)
CHLORIDE SERPL-SCNC: 103 MMOL/L (ref 96–112)
CO2 SERPL-SCNC: 24 MMOL/L (ref 20–33)
CREAT SERPL-MCNC: 1.02 MG/DL (ref 0.5–1.4)
ERYTHROCYTE [DISTWIDTH] IN BLOOD BY AUTOMATED COUNT: 43.6 FL (ref 35.9–50)
GFR SERPLBLD CREATININE-BSD FMLA CKD-EPI: 107 ML/MIN/1.73 M 2
GLUCOSE SERPL-MCNC: 100 MG/DL (ref 65–99)
HCT VFR BLD AUTO: 40 % (ref 42–52)
HGB BLD-MCNC: 14.1 G/DL (ref 14–18)
MCH RBC QN AUTO: 32.3 PG (ref 27–33)
MCHC RBC AUTO-ENTMCNC: 35.3 G/DL (ref 33.7–35.3)
MCV RBC AUTO: 91.7 FL (ref 81.4–97.8)
PLATELET # BLD AUTO: 207 K/UL (ref 164–446)
PMV BLD AUTO: 9.5 FL (ref 9–12.9)
POTASSIUM SERPL-SCNC: 3.9 MMOL/L (ref 3.6–5.5)
RBC # BLD AUTO: 4.36 M/UL (ref 4.7–6.1)
SODIUM SERPL-SCNC: 137 MMOL/L (ref 135–145)
WBC # BLD AUTO: 13.8 K/UL (ref 4.8–10.8)

## 2022-06-11 PROCEDURE — 700102 HCHG RX REV CODE 250 W/ 637 OVERRIDE(OP): Performed by: STUDENT IN AN ORGANIZED HEALTH CARE EDUCATION/TRAINING PROGRAM

## 2022-06-11 PROCEDURE — 99232 SBSQ HOSP IP/OBS MODERATE 35: CPT | Performed by: STUDENT IN AN ORGANIZED HEALTH CARE EDUCATION/TRAINING PROGRAM

## 2022-06-11 PROCEDURE — 700105 HCHG RX REV CODE 258: Performed by: STUDENT IN AN ORGANIZED HEALTH CARE EDUCATION/TRAINING PROGRAM

## 2022-06-11 PROCEDURE — 770001 HCHG ROOM/CARE - MED/SURG/GYN PRIV*

## 2022-06-11 PROCEDURE — A9270 NON-COVERED ITEM OR SERVICE: HCPCS | Performed by: STUDENT IN AN ORGANIZED HEALTH CARE EDUCATION/TRAINING PROGRAM

## 2022-06-11 PROCEDURE — 80048 BASIC METABOLIC PNL TOTAL CA: CPT

## 2022-06-11 PROCEDURE — 36415 COLL VENOUS BLD VENIPUNCTURE: CPT

## 2022-06-11 PROCEDURE — 93971 EXTREMITY STUDY: CPT | Mod: LT

## 2022-06-11 PROCEDURE — 85027 COMPLETE CBC AUTOMATED: CPT

## 2022-06-11 PROCEDURE — 700111 HCHG RX REV CODE 636 W/ 250 OVERRIDE (IP): Performed by: STUDENT IN AN ORGANIZED HEALTH CARE EDUCATION/TRAINING PROGRAM

## 2022-06-11 RX ORDER — OXYCODONE HYDROCHLORIDE 5 MG/1
5 TABLET ORAL EVERY 4 HOURS PRN
Status: DISCONTINUED | OUTPATIENT
Start: 2022-06-11 | End: 2022-06-11

## 2022-06-11 RX ORDER — OXYCODONE HYDROCHLORIDE 5 MG/1
5 TABLET ORAL EVERY 4 HOURS PRN
Status: DISCONTINUED | OUTPATIENT
Start: 2022-06-11 | End: 2022-06-12 | Stop reason: HOSPADM

## 2022-06-11 RX ADMIN — CEFAZOLIN SODIUM 2 G: 2 INJECTION, SOLUTION INTRAVENOUS at 22:03

## 2022-06-11 RX ADMIN — CEFAZOLIN SODIUM 2 G: 2 INJECTION, SOLUTION INTRAVENOUS at 07:13

## 2022-06-11 RX ADMIN — DOCUSATE SODIUM 50 MG AND SENNOSIDES 8.6 MG 2 TABLET: 8.6; 5 TABLET, FILM COATED ORAL at 07:13

## 2022-06-11 RX ADMIN — CEFAZOLIN SODIUM 2 G: 2 INJECTION, SOLUTION INTRAVENOUS at 13:55

## 2022-06-11 RX ADMIN — SODIUM CHLORIDE: 9 INJECTION, SOLUTION INTRAVENOUS at 13:56

## 2022-06-11 RX ADMIN — SODIUM CHLORIDE 1000 ML: 9 INJECTION, SOLUTION INTRAVENOUS at 00:00

## 2022-06-11 RX ADMIN — SODIUM CHLORIDE: 9 INJECTION, SOLUTION INTRAVENOUS at 02:43

## 2022-06-11 RX ADMIN — OXYCODONE 5 MG: 5 TABLET ORAL at 18:24

## 2022-06-11 RX ADMIN — OXYCODONE 5 MG: 5 TABLET ORAL at 08:42

## 2022-06-11 ASSESSMENT — ENCOUNTER SYMPTOMS
VOMITING: 0
SHORTNESS OF BREATH: 0
FALLS: 0
EYE PAIN: 0
CHILLS: 0
INSOMNIA: 0
DIARRHEA: 0
NAUSEA: 0
FOCAL WEAKNESS: 0
FEVER: 0
COUGH: 0
BLURRED VISION: 0
PALPITATIONS: 0
BACK PAIN: 0
HEADACHES: 0
SENSORY CHANGE: 0
MYALGIAS: 1
DIZZINESS: 0
SORE THROAT: 0
ABDOMINAL PAIN: 0

## 2022-06-11 ASSESSMENT — LIFESTYLE VARIABLES
EVER FELT BAD OR GUILTY ABOUT YOUR DRINKING: YES
SUBSTANCE_ABUSE: 1
HAVE PEOPLE ANNOYED YOU BY CRITICIZING YOUR DRINKING: NO
DOES PATIENT WANT TO STOP DRINKING: NO
AVERAGE NUMBER OF DAYS PER WEEK YOU HAVE A DRINK CONTAINING ALCOHOL: 0
EVER HAD A DRINK FIRST THING IN THE MORNING TO STEADY YOUR NERVES TO GET RID OF A HANGOVER: YES
ALCOHOL_USE: YES
HOW MANY TIMES IN THE PAST YEAR HAVE YOU HAD 5 OR MORE DRINKS IN A DAY: 2
TOTAL SCORE: 2
HAVE YOU EVER FELT YOU SHOULD CUT DOWN ON YOUR DRINKING: NO
ON A TYPICAL DAY WHEN YOU DRINK ALCOHOL HOW MANY DRINKS DO YOU HAVE: 4
CONSUMPTION TOTAL: POSITIVE

## 2022-06-11 ASSESSMENT — COGNITIVE AND FUNCTIONAL STATUS - GENERAL
MOBILITY SCORE: 21
WALKING IN HOSPITAL ROOM: A LITTLE
TOILETING: A LITTLE
CLIMB 3 TO 5 STEPS WITH RAILING: A LITTLE
SUGGESTED CMS G CODE MODIFIER MOBILITY: CJ
SUGGESTED CMS G CODE MODIFIER DAILY ACTIVITY: CI
DAILY ACTIVITIY SCORE: 23
STANDING UP FROM CHAIR USING ARMS: A LITTLE

## 2022-06-11 ASSESSMENT — PAIN DESCRIPTION - PAIN TYPE
TYPE: ACUTE PAIN

## 2022-06-11 ASSESSMENT — FIBROSIS 4 INDEX
FIB4 SCORE: 0.8
FIB4 SCORE: 0.67

## 2022-06-11 ASSESSMENT — PATIENT HEALTH QUESTIONNAIRE - PHQ9
SUM OF ALL RESPONSES TO PHQ9 QUESTIONS 1 AND 2: 0
SUM OF ALL RESPONSES TO PHQ9 QUESTIONS 1 AND 2: 0
2. FEELING DOWN, DEPRESSED, IRRITABLE, OR HOPELESS: NOT AT ALL
1. LITTLE INTEREST OR PLEASURE IN DOING THINGS: NOT AT ALL
1. LITTLE INTEREST OR PLEASURE IN DOING THINGS: NOT AT ALL

## 2022-06-11 NOTE — PROGRESS NOTES
[facility] Medicine Daily Progress Note    Date of Service  6/11/2022    Chief Complaint  Thomas Dancer is a 21 y.o. male admitted 6/10/2022 with leg pain, AMS.    Hospital Course  Thomas Dancer is a 21 y.o. male who brought in by EMS 6/10/2022 with AMS.  PMH of drug use.  Bystanders reported the patient was having seizure-like activity so they called 911, patient initially nonverbal on presentation but was more awake when I saw him, however still somewhat altered.  Patient says he uses methamphetamine, denies alcohol use, last used today.  On evaluation he was found to have left calf redness, swelling, pain.  He says has been there for a few days, denies injection drug use.    Interval Problem Update  Admitted yesterday for AMS, cellulitis.  Patient alert and oriented this morning, does not remember coming to ED. He notes to nursing that he was high on meth yesterday.  US LE negative for DVT.  Continue ancef for cellulitis.  Anticipate discharge to street tomorrow if cellulitis improves.    I have personally seen and examined the patient at bedside. I discussed the plan of care with patient.    Consultants/Specialty  none    Code Status  Full Code    Disposition  Patient is not medically cleared for discharge.   Anticipate discharge to to home with close outpatient follow-up.  I have placed the appropriate orders for post-discharge needs.    Review of Systems  Review of Systems   Constitutional: Negative for chills and fever.   Eyes: Negative for blurred vision and pain.   Respiratory: Negative for cough and shortness of breath.    Cardiovascular: Negative for chest pain, palpitations and leg swelling.   Gastrointestinal: Negative for abdominal pain, nausea and vomiting.   Genitourinary: Negative for dysuria and urgency.   Musculoskeletal: Positive for myalgias. Negative for back pain and falls.   Skin: Negative for itching and rash.   Neurological: Negative for dizziness, sensory change, focal weakness and  headaches.   Psychiatric/Behavioral: Positive for substance abuse. The patient does not have insomnia.         Physical Exam  Temp:  [36.4 °C (97.5 °F)-37.2 °C (99 °F)] 37.2 °C (99 °F)  Pulse:  [] 69  Resp:  [15-20] 20  BP: ()/(42-76) 111/58  SpO2:  [96 %-100 %] 100 %    Physical Exam  Constitutional:       General: He is not in acute distress.     Appearance: He is not ill-appearing.   HENT:      Head: Normocephalic and atraumatic.      Right Ear: External ear normal.      Left Ear: External ear normal.      Mouth/Throat:      Pharynx: No oropharyngeal exudate or posterior oropharyngeal erythema.   Eyes:      Extraocular Movements: Extraocular movements intact.      Pupils: Pupils are equal, round, and reactive to light.   Cardiovascular:      Rate and Rhythm: Normal rate and regular rhythm.      Pulses: Normal pulses.      Heart sounds: Normal heart sounds.   Pulmonary:      Effort: Pulmonary effort is normal. No respiratory distress.      Breath sounds: Normal breath sounds. No wheezing or rales.   Abdominal:      General: Bowel sounds are normal. There is no distension.      Palpations: Abdomen is soft.      Tenderness: There is no abdominal tenderness. There is no guarding.   Musculoskeletal:         General: Swelling and tenderness present.      Cervical back: Normal range of motion and neck supple.      Right lower leg: No edema.      Left lower leg: Edema present.   Skin:     General: Skin is warm and dry.   Neurological:      General: No focal deficit present.      Mental Status: He is oriented to person, place, and time.      Sensory: No sensory deficit.      Motor: No weakness.   Psychiatric:         Mood and Affect: Mood normal.         Behavior: Behavior normal.         Fluids    Intake/Output Summary (Last 24 hours) at 6/11/2022 1321  Last data filed at 6/11/2022 0952  Gross per 24 hour   Intake 320 ml   Output 1100 ml   Net -780 ml       Laboratory  Recent Labs     06/10/22  3485  06/11/22  0234   WBC 19.4* 13.8*   RBC 4.96 4.36*   HEMOGLOBIN 15.5 14.1   HEMATOCRIT 43.0 40.0*   MCV 86.7 91.7   MCH 31.3 32.3   MCHC 36.0* 35.3   RDW 40.6 43.6   PLATELETCT 246 207   MPV 9.8 9.5     Recent Labs     06/10/22  1842 06/11/22  0234   SODIUM 140 137   POTASSIUM 5.2 3.9   CHLORIDE 103 103   CO2 18* 24   GLUCOSE 106* 100*   BUN 22 17   CREATININE 1.35 1.02   CALCIUM 10.3 8.6                   Imaging  US-EXTREMITY VENOUS LOWER UNILAT LEFT   Final Result      CT-HEAD W/O   Final Result      Head CT without contrast within normal limits. No evidence of acute cerebral infarction, hemorrhage or mass lesion.              Assessment/Plan  * Sepsis without acute organ dysfunction (HCC)- (present on admission)  Assessment & Plan  This is Sepsis Present on admission  SIRS criteria identified on my evaluation include: Tachycardia, with heart rate greater than 90 BPM and Leukocytosis, with WBC greater than 12,000  Source is left lower extremity cellulitis  Sepsis protocol initiated  Fluid resuscitation ordered per protocol  Crystalloid Fluid Administration: Fluid resuscitation ordered per standard protocol - 30 mL/kg per current or ideal body weight  IV antibiotics as appropriate for source of sepsis  Reassessment: I have reassessed the patient's hemodynamic status    Toxic metabolic encephalopathy- (present on admission)  Assessment & Plan  Secondary to methamphetamine use, sepsis and dehydration due to left lower extremity cellulitis  Symptoms improving with steroids wearing off and treatment with IV fluids  Treat underlying cause    Methamphetamine use (HCC)- (present on admission)  Assessment & Plan  UDS positive, counseled on cessation    Cellulitis of left lower extremity- (present on admission)  Assessment & Plan  Left calf red, hot, swollen, tender.  No discharge  Will start on cefazolin, MRSA nares pending       VTE prophylaxis: enoxaparin ppx    I have performed a physical exam and reviewed and updated  ROS and Plan today (6/11/2022). In review of yesterday's note (6/10/2022), there are no changes except as documented above.

## 2022-06-11 NOTE — ED PROVIDER NOTES
"ED Provider Note    Scribed for Byron Rascon M.D. by Bishnu Vazquez. 6/10/2022  6:27 PM    Primary care provider: No primary care provider noted.  Means of arrival: EMS  History obtained from: EMS  History limited by: Patients altered level of consciousness    CHIEF COMPLAINT  Chief Complaint   Patient presents with   • ALOC     Per EMS report, pt was seen at bus stop from bystander that assumed seizure-like activity and called 911. Pt is nonverbal with EMS and staff. Pt continuously looks back and forth at hands. Appears anxious.        HPI  Tip Knowles is a 89 y.o. adult who presents to the Emergency Department for evaluation of a moderate altered level of consciousness onset prior to arrival. EMS states that a bystander reported the patient having seizure-like activity which prompted them to contact 911. Patient is nonverbal at the time of evaluation. Associated symptoms include left calf redness and left calf pain. He denies any fever. No alleviating or exacerbating factors noted. He denies any recent alcohol consumption or illicit drug use.     Further history of present illness cannot be obtained due to the patient's altered level of consciousness.    REVIEW OF SYSTEMS  Pertinent positives include: altered level of consciousness, left calf redness and left calf pain. Pertinent negatives include: fever. See history of present illness.  Further ROS cannot be obtained due to the patient's altered level of consciousness     PAST MEDICAL HISTORY       SURGICAL HISTORY  patient denies any surgical history    SOCIAL HISTORY      Social History     Substance and Sexual Activity   Drug Use None noted       FAMILY HISTORY  No family history noted.    CURRENT MEDICATIONS  Home Medications    **Home medications have not yet been reviewed for this encounter**         ALLERGIES  None noted    PHYSICAL EXAM  VITAL SIGNS: /69   Pulse 128   Temp 36.9 °C (98.5 °F) (Temporal)   Resp 20   Ht 1.676 m (5' 6\")   " Wt 68 kg (150 lb)   SpO2 97%   BMI 24.21 kg/m²     Constitutional: Alert in no apparent distress.  HENT: No signs of trauma, Bilateral external ears normal, Nose normal. Uvula midline.   Eyes: Pupils are equal and reactive, Conjunctiva normal, Non-icteric.   Neck: Normal range of motion, No tenderness, Supple, No stridor.   Lymphatic: No lymphadenopathy noted.   Cardiovascular: Regular rate and rhythm, no murmurs.   Thorax & Lungs: Normal breath sounds, No respiratory distress, No wheezing, No chest tenderness.   Abdomen:  Soft, No tenderness, No peritoneal signs, No masses, No pulsatile masses.   Skin: Warm, Dry, No erythema, No rash.   Back: No bony tenderness, No CVA tenderness.   Extremities: Intact distal pulses, No edema, Erythema and tenderness to the left calf, No cyanosis.  Musculoskeletal: Good range of motion in all major joints. No tenderness to palpation or major deformities noted.   Neurologic: Alert , Normal motor function, moving all 4 extremities spontaneously, Normal sensory function, No focal deficits noted.   Psychiatric: Affect normal, Judgment normal, Mood normal.     DIAGNOSTIC STUDIES / PROCEDURES    LABS  Labs Reviewed   CBC WITH DIFFERENTIAL - Abnormal; Notable for the following components:       Result Value    WBC 19.4 (*)     MCHC 36.0 (*)     Neutrophils-Polys 81.90 (*)     Lymphocytes 8.60 (*)     Neutrophils (Absolute) 15.88 (*)     Monos (Absolute) 1.71 (*)     All other components within normal limits   COMP METABOLIC PANEL - Abnormal; Notable for the following components:    Co2 18 (*)     Anion Gap 19.0 (*)     Glucose 106 (*)     Total Bilirubin 2.8 (*)     All other components within normal limits   URINE DRUG SCREEN - Abnormal; Notable for the following components:    Amphetamines Urine Positive (*)     All other components within normal limits   URINALYSIS - Abnormal; Notable for the following components:    Ketones 15 (*)     Protein 30 (*)     Bilirubin Small (*)     All  "other components within normal limits   CREATINE KINASE - Abnormal; Notable for the following components:    CPK Total 205 (*)     All other components within normal limits   URINE MICROSCOPIC (W/UA) - Abnormal; Notable for the following components:    WBC Rare (*)     All other components within normal limits   DIAGNOSTIC ALCOHOL   ESTIMATED GFR   LACTIC ACID   BLOOD CULTURE    Narrative:     1 of 2 for Blood Culture x 2 sites order. Per Hospital  Policy: Only change Specimen Src: to \"Line\" if specified by  physician order.   BLOOD CULTURE    Narrative:     2 of 2 blood culture x2  Sites order. Per Hospital Policy:  Only change Specimen Src: to \"Line\" if specified by physician  order.   LACTIC ACID   LACTIC ACID      All labs reviewed by me.    EKG   Report   Date Value Ref Range Status   06/10/2022       [facility] Emergency Dept.    Test Date:  2022-06-10  Pt Name:    LOLIS LOCO            Department: ER  MRN:        [id number]                      Room:       [facility]  Gender:                                  Technician: [id number]  :        1900                   Requested By:ER TRIAGE PROTOCOL  Order #:    [id number]                    Reading MD:    Measurements  Intervals                                Axis  Rate:       122                          P:          81  VT:         132                          QRS:        96  QRSD:       86                           T:          38  QT:         296  QTc:        422    Interpretive Statements  SINUS TACHYCARDIA  CONSIDER RIGHT ATRIAL ABNORMALITY  RIGHT AXIS DEVIATION  BORDERLINE ST ELEVATION, ANTERIOR LEADS  BASELINE WANDER IN LEAD(S) V2  No previous ECG available for comparison                  RADIOLOGY  CT-HEAD W/O   Final Result      Head CT without contrast within normal limits. No evidence of acute cerebral infarction, hemorrhage or mass lesion.         US-EXTREMITY VENOUS LOWER UNILAT LEFT    (Results Pending)     The radiologist's " interpretation of all radiological studies have been reviewed by me.    COURSE & MEDICAL DECISION MAKING  Nursing notes, VS, PMSFHx reviewed in chart.    89 y.o. adult p/w chief complaint of altered level of consciousness onset prior to arrival.    The differential diagnoses include but are not limited to:   #altered level of consciousness, left calf redness, left calf pain    Patient arrived with physical exam concerning for sympathomimetic toxidrome  Patient was altered and moved his hands however given patient nonverbal upon arrival and newly altered CT scan of head obtained to rule out ICH.  No evidence of ICH noted.    Patient given fluids to ensure no evidence of rhabdomyolysis  Given patient's significant elevated white blood cell count and left calf erythema plan for antibiotics and blood cultures      6:27 PM Patient seen and examined at bedside. Ordered EKG, CMP, CBC with diff, UA, Urine Drug Screen, Diagnostic Alcohol, Creatine Kinase, and CT-Head W/O to further evaluate. Patient will be treated with Ativan 2 mg and LR Bolus. Discussed utilizing labs and imaging to further evaluate, they are amenable to the plan of care.    6:55 PM - Patient was reevaluated at bedside, he is alert.     10:36 PM - Ordered Urine Microscopic (W/UA) to further evaluate the patient    10:38 PM - Patient was reevaluated at bedside, he report left calf pain and swelling. On physical exam there is erythema and tenderness to the left calf.    10:52 PM - Ordered Ancef 2000 mg IVPB premix to treat the patient. Also ordered Lactic Acid, Blood Culture, and US-Extremity Venous Lower Unilat Left to further evaluate the patient     11:17 PM - Paged Hospitalist    11:22 PM I discussed the patient's case and the above findings with Dr. Perla (Hospitalist) who will assess the patient for hospitalization.     12:53 AM - Ordered Lactic Acid to further evaluate the patient     2:53 AM - Ordered Lactic Acid to further evaluate the  patient    Intravenous fluids administered for dehydration and tachycardia.  Patient not appropriate for oral rehydration, as surgical process needs to be ruled out before trial of oral rehydration.   On repeat evaluation,  Pt w/ positive fluid response.      I verified that the patient was wearing a mask and I was wearing appropriate PPE every time I entered the room. The patient's mask was on the patient at all times during my encounter except for a brief view of the oropharynx.       The total critical care time on this patient is 40 minutes, resuscitating patient, speaking with admitting physician, and deciphering test results. This 40 minutes is exclusive of separately billable procedures.     DISPOSITION:  Patient will be hospitalized by Dr. Perla in guarded condition.      FINAL IMPRESSION  1. Altered mental status, unspecified altered mental status type    2. Sepsis, due to unspecified organism, unspecified whether acute organ dysfunction present (HCC)    3. Cellulitis of left lower extremity      CCT:40min     Bishnu MELISSA (Scribe), am scribing for, and in the presence of, Byron Rascon M.D..    Electronically signed by: Bishnu Vazquez (Scribe), 6/10/2022    Byron MELISSA M.D. personally performed the services described in this documentation, as scribed by Bishnu Vazquez in my presence, and it is both accurate and complete.    The note accurately reflects work and decisions made by me.  Byron Rascon M.D.  6/11/2022  1:15 AM

## 2022-06-11 NOTE — PROGRESS NOTES
Received bedside report from night shift RN.   Assumed care of patient at change of shift.   Assessment complete and POC discussed.   STATUS: Patient is A&Ox4, VSS, on RA.   PAIN: Patient reports 8/10 leg pain. Medicated with oxycodone.   Patient is sitting up in bed for breakfast.   Bed alarm set, call light in reach.  Bed is in lowest/locked position.   Call light and belongings are within reach.   No further needs at this time.

## 2022-06-11 NOTE — CARE PLAN
The patient is Stable - Low risk of patient condition declining or worsening    Shift Goals  Clinical Goals: abx  Patient Goals: pain management    Progress made toward(s) clinical / shift goals:  IV abx received today. Pain controlled with po oxycodone.     Patient is not progressing towards the following goals:      Problem: Knowledge Deficit - Standard  Goal: Patient and family/care givers will demonstrate understanding of plan of care, disease process/condition, diagnostic tests and medications  Outcome: Progressing     Problem: Pain - Standard  Goal: Alleviation of pain or a reduction in pain to the patient’s comfort goal  Outcome: Progressing

## 2022-06-11 NOTE — PROGRESS NOTES
4 Eyes Skin Assessment Completed by LANCE Lomax and LANCE Parada.    Head WDL  Ears WDL  Nose WDL  Mouth dry cracked lips  Neck WDL  Breast/Chest WDL  Shoulder Blades WDL  Spine WDL  (R) Arm/Elbow/Hand scars to wrist  (L) Arm/Elbow/Hand WDL  Abdomen WDL  Groin MILA  Scrotum/Coccyx/Buttocks MILA (refused )  (R) Leg WDL  (L) Leg swelling  (R) Heel/Foot/Toe dry, scaly  (L) Heel/Foot/Toe dry, scaly          Devices In Places PIV      Interventions In Place Pressure Redistribution Mattress    Possible Skin Injury No    Pictures Uploaded Into Epic N/A  Wound Consult Placed N/A  RN Wound Prevention Protocol Ordered No

## 2022-06-11 NOTE — H&P
Hospital Medicine History & Physical Note    Date of Service  6/10/2022    Primary Care Physician  No primary care provider on file.  Code Status  Full Code    Chief Complaint  Chief Complaint   Patient presents with   • ALOC     Per EMS report, pt was seen at bus stop from bystander that assumed seizure-like activity and called 911. Pt is nonverbal with EMS and staff. Pt continuously looks back and forth at hands. Appears anxious.        History of Presenting Illness  Thomas Dancer is a 21 y.o. male who brought in by EMS 6/10/2022 with AMS.  PMH of drug use.  Bystanders reported the patient was having seizure-like activity so they called 911, patient initially nonverbal on presentation but was more awake when I saw him, however still somewhat altered.  Patient says he uses methamphetamine, denies alcohol use, last used today.  On evaluation he was found to have left calf redness, swelling, pain.  He says has been there for a few days, denies injection drug use.    I discussed the plan of care with patient, bedside RN and edp.    Review of Systems  Review of Systems   Constitutional: Negative for chills and fever.   HENT: Negative for sore throat.    Respiratory: Negative for cough and shortness of breath.    Cardiovascular: Negative for chest pain.   Gastrointestinal: Negative for abdominal pain, diarrhea, nausea and vomiting.   Genitourinary: Negative for dysuria and urgency.   Neurological: Negative for dizziness and headaches.   Psychiatric/Behavioral: Positive for substance abuse.   All other systems reviewed and are negative.      Past Medical History   has no past medical history on file.    Surgical History   has no past surgical history on file.     Family History  family history is not on file.   Family history reviewed with patient. There is no family history that is pertinent to the chief complaint.     Social History       Allergies  Not on File    Medications  None       Physical Exam  Temp:  [36.4 °C  (97.5 °F)-36.9 °C (98.5 °F)] 36.4 °C (97.5 °F)  Pulse:  [] 67  Resp:  [15-20] 18  BP: ()/(42-76) 99/42  SpO2:  [96 %-98 %] 98 %  Blood Pressure: 128/76   Temperature: 36.9 °C (98.5 °F)   Pulse: 91   Respiration: 15   Pulse Oximetry: 97 %       Physical Exam  Constitutional:       Appearance: Normal appearance.   HENT:      Head: Normocephalic and atraumatic.      Mouth/Throat:      Mouth: Mucous membranes are dry.      Pharynx: Oropharynx is clear. No oropharyngeal exudate or posterior oropharyngeal erythema.   Eyes:      General: No scleral icterus.  Cardiovascular:      Rate and Rhythm: Normal rate and regular rhythm.      Pulses: Normal pulses.      Heart sounds: Normal heart sounds. No murmur heard.  Pulmonary:      Effort: Pulmonary effort is normal. No respiratory distress.      Breath sounds: Normal breath sounds. No wheezing.   Abdominal:      Palpations: Abdomen is soft.      Tenderness: There is no abdominal tenderness.   Musculoskeletal:         General: No swelling or tenderness. Normal range of motion.      Cervical back: Normal range of motion.      Comments: Erythema, warmth, swelling of the left lower extremity in the calf, no discharge   Skin:     General: Skin is warm and dry.   Neurological:      General: No focal deficit present.      Mental Status: He is alert. Mental status is at baseline.      Comments: Not oriented to time, otherwise oriented to self, place, situation   Psychiatric:         Mood and Affect: Mood normal.         Laboratory:  Recent Labs     06/10/22  1842 06/11/22  0234   WBC 19.4* 13.8*   RBC 4.96 4.36*   HEMOGLOBIN 15.5 14.1   HEMATOCRIT 43.0 40.0*   MCV 86.7 91.7   MCH 31.3 32.3   MCHC 36.0* 35.3   RDW 40.6 43.6   PLATELETCT 246 207   MPV 9.8 9.5     Recent Labs     06/10/22  1842 06/11/22  0234   SODIUM 140 137   POTASSIUM 5.2 3.9   CHLORIDE 103 103   CO2 18* 24   GLUCOSE 106* 100*   BUN 22 17   CREATININE 1.35 1.02   CALCIUM 10.3 8.6     Recent Labs      06/10/22  1842 06/11/22  0234   ALTSGPT 11  --    ASTSGOT 26  --    ALKPHOSPHAT 57  --    TBILIRUBIN 2.8*  --    GLUCOSE 106* 100*         No results for input(s): NTPROBNP in the last 72 hours.      No results for input(s): TROPONINT in the last 72 hours.    Imaging:  US-EXTREMITY VENOUS LOWER UNILAT LEFT   Final Result      CT-HEAD W/O   Final Result      Head CT without contrast within normal limits. No evidence of acute cerebral infarction, hemorrhage or mass lesion.             EKG:  I have personally reviewed the images and compared with prior images. and My impression is: Sinus tachycardia, RAD    Assessment/Plan:  Justification for Admission Status  I anticipate this patient will require at least two midnights for appropriate medical management, necessitating inpatient admission because Patient with sepsis, positive SIRS criteria  and significant leukocytosis with left lower extremity cellulitis.  Needs IV fluids and IV antibiotics.  He is also quite dry and needs monitoring of his labs.    * Sepsis without acute organ dysfunction (HCC)- (present on admission)  Assessment & Plan  This is Sepsis Present on admission  SIRS criteria identified on my evaluation include: Tachycardia, with heart rate greater than 90 BPM and Leukocytosis, with WBC greater than 12,000  Source is left lower extremity cellulitis  Sepsis protocol initiated  Fluid resuscitation ordered per protocol  Crystalloid Fluid Administration: Fluid resuscitation ordered per standard protocol - 30 mL/kg per current or ideal body weight  IV antibiotics as appropriate for source of sepsis  Reassessment: I have reassessed the patient's hemodynamic status    Cellulitis of left lower extremity- (present on admission)  Assessment & Plan  Left calf red, hot, swollen, tender.  No discharge  Will start on cefazolin, MRSA nares pending    Toxic metabolic encephalopathy- (present on admission)  Assessment & Plan  Secondary to methamphetamine use, sepsis and  dehydration due to left lower extremity cellulitis  Symptoms improving with steroids wearing off and treatment with IV fluids  Treat underlying cause    Methamphetamine use (HCC)- (present on admission)  Assessment & Plan  UDS positive, counseled on cessation      VTE prophylaxis: enoxaparin ppx

## 2022-06-11 NOTE — ASSESSMENT & PLAN NOTE
Secondary to methamphetamine use, sepsis and dehydration due to left lower extremity cellulitis  Symptoms improving with steroids wearing off and treatment with IV fluids  Treat underlying cause

## 2022-06-11 NOTE — PROGRESS NOTES
Patient received to unit, unable to stay awake long enough to answer questions and at time mumbles. No signs or symptoms of distress. 4 eyes skin check completed. Fall precautions in place. Bed locked and in lowest position.

## 2022-06-11 NOTE — ED NOTES
Pt complaining of left leg pain in calf area. Redness and swelling noted, unable to ambulate. ERP notified.

## 2022-06-11 NOTE — ED TRIAGE NOTES
"Chief Complaint   Patient presents with   • ALOC     Per EMS report, pt was seen at bus stop from bystander that assumed seizure-like activity and called 911. Pt is nonverbal with EMS and staff. Pt continuously looks back and forth at hands. Appears anxious.      /69   Pulse 128   Temp 36.9 °C (98.5 °F) (Temporal)   Resp 20   Ht 1.676 m (5' 6\")   Wt 68 kg (150 lb)   SpO2 97%   BMI 24.21 kg/m²     "

## 2022-06-12 ENCOUNTER — PHARMACY VISIT (OUTPATIENT)
Dept: PHARMACY | Facility: MEDICAL CENTER | Age: 22
End: 2022-06-12
Payer: COMMERCIAL

## 2022-06-12 VITALS
BODY MASS INDEX: 24.13 KG/M2 | TEMPERATURE: 98.7 F | DIASTOLIC BLOOD PRESSURE: 55 MMHG | OXYGEN SATURATION: 100 % | RESPIRATION RATE: 18 BRPM | WEIGHT: 150.13 LBS | SYSTOLIC BLOOD PRESSURE: 122 MMHG | HEIGHT: 66 IN | HEART RATE: 51 BPM

## 2022-06-12 LAB
EKG IMPRESSION: NORMAL
ERYTHROCYTE [DISTWIDTH] IN BLOOD BY AUTOMATED COUNT: 43.8 FL (ref 35.9–50)
HCT VFR BLD AUTO: 36.8 % (ref 42–52)
HGB BLD-MCNC: 12.7 G/DL (ref 14–18)
MCH RBC QN AUTO: 31.9 PG (ref 27–33)
MCHC RBC AUTO-ENTMCNC: 34.5 G/DL (ref 33.7–35.3)
MCV RBC AUTO: 92.5 FL (ref 81.4–97.8)
PLATELET # BLD AUTO: 184 K/UL (ref 164–446)
PMV BLD AUTO: 10.2 FL (ref 9–12.9)
RBC # BLD AUTO: 3.98 M/UL (ref 4.7–6.1)
WBC # BLD AUTO: 8.2 K/UL (ref 4.8–10.8)

## 2022-06-12 PROCEDURE — 85027 COMPLETE CBC AUTOMATED: CPT

## 2022-06-12 PROCEDURE — A9270 NON-COVERED ITEM OR SERVICE: HCPCS | Performed by: STUDENT IN AN ORGANIZED HEALTH CARE EDUCATION/TRAINING PROGRAM

## 2022-06-12 PROCEDURE — 97161 PT EVAL LOW COMPLEX 20 MIN: CPT

## 2022-06-12 PROCEDURE — 99239 HOSP IP/OBS DSCHRG MGMT >30: CPT | Performed by: STUDENT IN AN ORGANIZED HEALTH CARE EDUCATION/TRAINING PROGRAM

## 2022-06-12 PROCEDURE — 36415 COLL VENOUS BLD VENIPUNCTURE: CPT

## 2022-06-12 PROCEDURE — 700102 HCHG RX REV CODE 250 W/ 637 OVERRIDE(OP): Performed by: STUDENT IN AN ORGANIZED HEALTH CARE EDUCATION/TRAINING PROGRAM

## 2022-06-12 PROCEDURE — 700111 HCHG RX REV CODE 636 W/ 250 OVERRIDE (IP): Performed by: STUDENT IN AN ORGANIZED HEALTH CARE EDUCATION/TRAINING PROGRAM

## 2022-06-12 PROCEDURE — 97116 GAIT TRAINING THERAPY: CPT

## 2022-06-12 PROCEDURE — RXMED WILLOW AMBULATORY MEDICATION CHARGE: Performed by: STUDENT IN AN ORGANIZED HEALTH CARE EDUCATION/TRAINING PROGRAM

## 2022-06-12 PROCEDURE — 700105 HCHG RX REV CODE 258: Performed by: STUDENT IN AN ORGANIZED HEALTH CARE EDUCATION/TRAINING PROGRAM

## 2022-06-12 RX ORDER — CEPHALEXIN 500 MG/1
500 CAPSULE ORAL EVERY 6 HOURS
Status: DISCONTINUED | OUTPATIENT
Start: 2022-06-12 | End: 2022-06-12 | Stop reason: HOSPADM

## 2022-06-12 RX ORDER — OXYCODONE HYDROCHLORIDE 5 MG/1
5 TABLET ORAL EVERY 6 HOURS PRN
Qty: 9 TABLET | Refills: 0 | Status: SHIPPED | OUTPATIENT
Start: 2022-06-12 | End: 2022-06-12 | Stop reason: SDUPTHER

## 2022-06-12 RX ORDER — CEPHALEXIN 500 MG/1
500 CAPSULE ORAL EVERY 6 HOURS
Qty: 12 CAPSULE | Refills: 0 | Status: SHIPPED | OUTPATIENT
Start: 2022-06-12 | End: 2022-06-15

## 2022-06-12 RX ORDER — OXYCODONE HYDROCHLORIDE 5 MG/1
5 TABLET ORAL EVERY 6 HOURS PRN
Qty: 9 TABLET | Refills: 0 | Status: SHIPPED | OUTPATIENT
Start: 2022-06-12 | End: 2022-06-15

## 2022-06-12 RX ADMIN — CEPHALEXIN 500 MG: 500 CAPSULE ORAL at 12:16

## 2022-06-12 RX ADMIN — SODIUM CHLORIDE: 9 INJECTION, SOLUTION INTRAVENOUS at 05:25

## 2022-06-12 RX ADMIN — CEPHALEXIN 500 MG: 500 CAPSULE ORAL at 18:00

## 2022-06-12 RX ADMIN — OXYCODONE 5 MG: 5 TABLET ORAL at 15:44

## 2022-06-12 RX ADMIN — CEFAZOLIN SODIUM 2 G: 2 INJECTION, SOLUTION INTRAVENOUS at 05:25

## 2022-06-12 RX ADMIN — DOCUSATE SODIUM 50 MG AND SENNOSIDES 8.6 MG 2 TABLET: 8.6; 5 TABLET, FILM COATED ORAL at 05:25

## 2022-06-12 ASSESSMENT — COGNITIVE AND FUNCTIONAL STATUS - GENERAL
SUGGESTED CMS G CODE MODIFIER MOBILITY: CJ
CLIMB 3 TO 5 STEPS WITH RAILING: A LITTLE
WALKING IN HOSPITAL ROOM: A LITTLE
STANDING UP FROM CHAIR USING ARMS: A LITTLE
MOBILITY SCORE: 21

## 2022-06-12 ASSESSMENT — ENCOUNTER SYMPTOMS
FEVER: 0
MYALGIAS: 1
ABDOMINAL PAIN: 0
PALPITATIONS: 0
SHORTNESS OF BREATH: 0
NAUSEA: 0
FOCAL WEAKNESS: 0
VOMITING: 0
BLURRED VISION: 0
COUGH: 0
FALLS: 0
HEADACHES: 0
DIZZINESS: 0
BACK PAIN: 0
INSOMNIA: 0
EYE PAIN: 0
CHILLS: 0
SENSORY CHANGE: 0

## 2022-06-12 ASSESSMENT — LIFESTYLE VARIABLES: SUBSTANCE_ABUSE: 1

## 2022-06-12 ASSESSMENT — GAIT ASSESSMENTS
DEVIATION: ANTALGIC
DISTANCE (FEET): 15
GAIT LEVEL OF ASSIST: SUPERVISED

## 2022-06-12 ASSESSMENT — PAIN DESCRIPTION - PAIN TYPE: TYPE: ACUTE PAIN

## 2022-06-12 NOTE — DISCHARGE PLANNING
Meds-to-Beds: Discharge prescription orders listed below delivered to patient's bedside. LANCE Leiva notified. Patient counseled.      Patient elected to have co-payment billed to patient account.     Current Outpatient Medications   Medication Sig Dispense Refill   • cephALEXin (KEFLEX) 500 MG Cap Take 1 Capsule by mouth every 6 hours for 3 days. 12 Capsule 0      Emilio Villarreal

## 2022-06-12 NOTE — THERAPY
Physical Therapy   Initial Evaluation     Patient Name: Thomas Dancer  Age:  21 y.o., Sex:  male  Medical Record #: [id number]  Today's Date: 6/12/2022          Assessment  Patient is 21 y.o. male with sepsis with cellulitis LLE, with meth use. Pt reports he is homeless, but avoided answering questions of what he does for bathing and sleeping. He reports he is independent with mobility at baseline sans AD. Today, he demonstrated antalgic gait  sans AD, with improved gait with use of crutches. PT recommends use of crutches for pain management upon D/C. Pt with no further need for skilled PT in the acute setting at this time.     Plan    Recommend Physical Therapy for Evaluation only     DC Equipment Recommendations: (P) Crutches  Discharge Recommendations: (P) Anticipate that the patient will have no further physical therapy needs after discharge from the hospital       Subjective    Pt reporting pain in LLE with walking.     Objective       06/12/22 1223   Pain 0 - 10 Group   Location Leg   Location Orientation Left   Therapist Pain Assessment 8;Post Activity Pain Same as Prior to Activity   Prior Living Situation   Prior Services Home-Independent   Housing / Facility Homeless   Steps Into Home 0   Steps In Home 0   Bathroom Set up   (spot bathing as able)   Equipment Owned None   Lives with - Patient's Self Care Capacity Alone and Able to Care For Self   Prior Level of Functional Mobility   Bed Mobility Independent   Transfer Status Independent   Ambulation Independent   Distance Ambulation (Feet)   (community)   Assistive Devices Used None   Stairs Independent   History of Falls   History of Falls   (unknown)   Cognition    Comments delayed responses, some decreased ability to shift attention   Strength Lower Body   Comments WFL for mobility   Balance Assessment   Sitting Balance (Static) Good   Sitting Balance (Dynamic) Good   Standing Balance (Static) Fair   Standing Balance (Dynamic) Fair   Weight Shift Sitting  Fair   Weight Shift Standing   (limited due to pain)   Comments with crutches, FWW, no AD   Gait Analysis   Gait Level Of Assist Supervised   Assistive Device Crutches;Front Wheel Walker;None   Distance (Feet) 15   # of Times Distance was Traveled 6   Deviation Antalgic   # of Stairs Climbed 0   Weight Bearing Status no restriction   Comments attempted gait with FWW, crutches, and no AD with antalgic performance with all attempts. Use of AD for pain management over balance   Bed Mobility    Supine to Sit Supervised   Sit to Supine Supervised   Functional Mobility   Sit to Stand Standby Assist   Transfer Method Stand Step   Mobility supine<->sit EOB<->stand<->amb   How much difficulty does the patient currently have...   Turning over in bed (including adjusting bedclothes, sheets and blankets)? 4   Sitting down on and standing up from a chair with arms (e.g., wheelchair, bedside commode, etc.) 4   Moving from lying on back to sitting on the side of the bed? 4   How much help from another person does the patient currently need...   Moving to and from a bed to a chair (including a wheelchair)? 3   Need to walk in a hospital room? 3   Climbing 3-5 steps with a railing? 3   6 clicks Mobility Score 21   Activity Tolerance   Sitting in Chair declined   Sitting Edge of Bed 3x1 min approx   Standing 3x5 min approx   Comments limited by pain   Education Group   Education Provided Role of Physical Therapist;Gait Training;Use of Assistive Device   Role of Physical Therapist Patient Response Patient;Acceptance;Explanation;Verbal Demonstration   Gait Training Patient Response Patient;Acceptance;Explanation;Verbal Demonstration   Use of Assistive Device Patient Response Patient;Acceptance;Explanation;Demonstration;Verbal Demonstration;Action Demonstration   Problem List    Problems Pain   Anticipated Discharge Equipment and Recommendations   DC Equipment Recommendations Crutches   Discharge Recommendations Anticipate that the  patient will have no further physical therapy needs after discharge from the hospital

## 2022-06-12 NOTE — DISCHARGE PLANNING
Meds-to-Beds: Discharge prescription orders listed below delivered to patient's bedside. LANCE Leiva notified. Patient counseled.      Patient elected to have co-payment billed to patient account.     Current Outpatient Medications   Medication Sig Dispense Refill   • cephALEXin (KEFLEX) 500 MG Cap Take 1 Capsule by mouth every 6 hours for 3 days. 12 Capsule 0   • oxyCODONE immediate-release (ROXICODONE) 5 MG Tab Take 1 Tablet by mouth every 6 hours as needed for Severe Pain for up to 3 days. 9 Tablet 0      Emilio Villarreal

## 2022-06-12 NOTE — PROGRESS NOTES
Received report and assumed care at shift change. A&O x 2-3, alert to self and place , compliant with cares. Fall precautions in place, bed locked and in lowest position. Needs attended to. No complains of pain

## 2022-06-12 NOTE — DISCHARGE INSTRUCTIONS
Discharge Instructions    Discharged to other by car with friend. Discharged via wheelchair, hospital escort: Yes.  Special equipment needed: Crutches    Be sure to schedule a follow-up appointment with your primary care doctor or any specialists as instructed.     Discharge Plan:        I understand that a diet low in cholesterol, fat, and sodium is recommended for good health. Unless I have been given specific instructions below for another diet, I accept this instruction as my diet prescription    Special Instructions: None    Is patient discharged on Warfarin / Coumadin?   No     Depression / Suicide Risk    As you are discharged from this [facility] facility, it is important to learn how to keep safe from harming yourself.    Recognize the warning signs:  Abrupt changes in personality, positive or negative- including increase in energy   Giving away possessions  Change in eating patterns- significant weight changes-  positive or negative  Change in sleeping patterns- unable to sleep or sleeping all the time   Unwillingness or inability to communicate  Depression  Unusual sadness, discouragement and loneliness  Talk of wanting to die  Neglect of personal appearance   Rebelliousness- reckless behavior  Withdrawal from people/activities they love  Confusion- inability to concentrate     If you or a loved one observes any of these behaviors or has concerns about self-harm, here's what you can do:  Talk about it- your feelings and reasons for harming yourself  Remove any means that you might use to hurt yourself (examples: pills, rope, extension cords, firearm)  Get professional help from the community (Mental Health, Substance Abuse, psychological counseling)  Do not be alone:Call your Safe Contact- someone whom you trust who will be there for you.  Call your local CRISIS HOTLINE 827-5318 or 652-137-0238  Call your local Children's Mobile Crisis Response Team [place] (502) 874-0503 or  www.Plurchase.Crowdtap  Call the toll free National Suicide Prevention Hotlines   National Suicide Prevention Lifeline 573-404-WEEC (6112)  National MyActivity[employer] Line Network 800-SUICIDE (354-6606)

## 2022-06-12 NOTE — DISCHARGE SUMMARY
Discharge Summary    CHIEF COMPLAINT ON ADMISSION  Chief Complaint   Patient presents with   • ALOC     Per EMS report, pt was seen at bus stop from bystander that assumed seizure-like activity and called 911. Pt is nonverbal with EMS and staff. Pt continuously looks back and forth at hands. Appears anxious.        Reason for Admission  Sent By EMS     Admission Date  6/10/2022    CODE STATUS  Full Code    HPI & HOSPITAL COURSE  This is a 21 y.o. male here with altered mental status and leg pain. Patient with history of methamphetamine abuse, homelessness, who presented for AMS and leg pain. Patient's urine drug screen positive for meth, he was treated with IV antibiotics for left leg cellulitis. His altered mental status resolved, patient reported being super high on meth and hurt his leg prior to presentation. His left lower leg showed signs of cellulitis, improved with antibiotics. Patient is alert and oriented at time of discharge. He is to finish oral antibiotic course of keflex. Physical therapy evaluated patient, recommend crutches for ambulation. Patient provided crutches, antibiotics, and paper prescription for short course oxycodone. Patient feeling well, he is discharged to street.    Therefore, he is discharged in fair and stable condition to home with close outpatient follow-up.    The patient met 2-midnight criteria for an inpatient stay at the time of discharge.    Discharge Date  6/12/2022    FOLLOW UP ITEMS POST DISCHARGE  Take medications as prescribed.  Follow up with PCP.    DISCHARGE DIAGNOSES  Principal Problem:    Sepsis without acute organ dysfunction (HCC) POA: Yes  Active Problems:    Cellulitis of left lower extremity POA: Yes    Methamphetamine use (HCC) POA: Yes    Toxic metabolic encephalopathy POA: Yes  Resolved Problems:    * No resolved hospital problems. *      FOLLOW UP  No future appointments.  No follow-up provider specified.    MEDICATIONS ON DISCHARGE     Medication List       START taking these medications      Instructions   cephALEXin 500 MG Caps  Commonly known as: KEFLEX   Take 1 Capsule by mouth every 6 hours for 3 days.  Dose: 500 mg     oxyCODONE immediate-release 5 MG Tabs  Commonly known as: ROXICODONE   Take 1 Tablet by mouth every 6 hours as needed for Severe Pain for up to 3 days.  Dose: 5 mg            Allergies  No Known Allergies    DIET  Orders Placed This Encounter   Procedures   • Diet Order Diet: Regular     Standing Status:   Standing     Number of Occurrences:   1     Order Specific Question:   Diet:     Answer:   Regular [1]       ACTIVITY  As tolerated.  Weight bearing as tolerated    CONSULTATIONS  none    PROCEDURES  none    LABORATORY  Lab Results   Component Value Date    SODIUM 137 06/11/2022    POTASSIUM 3.9 06/11/2022    CHLORIDE 103 06/11/2022    CO2 24 06/11/2022    GLUCOSE 100 (H) 06/11/2022    BUN 17 06/11/2022    CREATININE 1.02 06/11/2022        Lab Results   Component Value Date    WBC 8.2 06/12/2022    HEMOGLOBIN 12.7 (L) 06/12/2022    HEMATOCRIT 36.8 (L) 06/12/2022    PLATELETCT 184 06/12/2022        Total time of the discharge process exceeds 32 minutes.

## 2022-06-12 NOTE — PROGRESS NOTES
[facility] Medicine Daily Progress Note    Date of Service  6/12/2022    Chief Complaint  Thomas Dancer is a 21 y.o. male admitted 6/10/2022 with leg pain, AMS.    Hospital Course  Thomas Dancer is a 21 y.o. male who brought in by EMS 6/10/2022 with AMS.  PMH of drug use.  Bystanders reported the patient was having seizure-like activity so they called 911, patient initially nonverbal on presentation but was more awake when I saw him, however still somewhat altered.  Patient says he uses methamphetamine, denies alcohol use, last used today.  On evaluation he was found to have left calf redness, swelling, pain.  He says has been there for a few days, denies injection drug use.    Interval Problem Update  No acute events overnight.  LE cellulitis appears less red and inflamed, however patient reports more pain from leg site.  Stop ancef, start keflex for cellulitis.  Pain control.  PT evaluation ordered given patient's leg pain and mobility concerns. His 6 click scores are high though.  Anticipate discharge to street today if PT recommend home health or less needs.  Patient is medically cleared for discharge.    I have personally seen and examined the patient at bedside. I discussed the plan of care with patient.    Consultants/Specialty  none    Code Status  Full Code    Disposition  Patient is medically cleared for discharge.   Anticipate discharge to to home with close outpatient follow-up.  I have placed the appropriate orders for post-discharge needs.    Review of Systems  Review of Systems   Constitutional: Negative for chills and fever.   Eyes: Negative for blurred vision and pain.   Respiratory: Negative for cough and shortness of breath.    Cardiovascular: Negative for chest pain, palpitations and leg swelling.   Gastrointestinal: Negative for abdominal pain, nausea and vomiting.   Genitourinary: Negative for dysuria and urgency.   Musculoskeletal: Positive for myalgias. Negative for back pain and falls.   Skin:  Negative for itching and rash.   Neurological: Negative for dizziness, sensory change, focal weakness and headaches.   Psychiatric/Behavioral: Positive for substance abuse. The patient does not have insomnia.         Physical Exam  Temp:  [36.4 °C (97.5 °F)-37.2 °C (99 °F)] 36.5 °C (97.7 °F)  Pulse:  [66-87] 87  Resp:  [18-20] 18  BP: ()/(49-71) 111/71  SpO2:  [92 %-100 %] 100 %    Physical Exam  Constitutional:       General: He is not in acute distress.     Appearance: He is not ill-appearing.   HENT:      Head: Normocephalic and atraumatic.      Right Ear: External ear normal.      Left Ear: External ear normal.      Mouth/Throat:      Pharynx: No oropharyngeal exudate or posterior oropharyngeal erythema.   Eyes:      Extraocular Movements: Extraocular movements intact.      Pupils: Pupils are equal, round, and reactive to light.   Cardiovascular:      Rate and Rhythm: Normal rate and regular rhythm.      Pulses: Normal pulses.      Heart sounds: Normal heart sounds.   Pulmonary:      Effort: Pulmonary effort is normal. No respiratory distress.      Breath sounds: Normal breath sounds. No wheezing or rales.   Abdominal:      General: Bowel sounds are normal. There is no distension.      Palpations: Abdomen is soft.      Tenderness: There is no abdominal tenderness. There is no guarding.   Musculoskeletal:         General: Swelling and tenderness present.      Cervical back: Normal range of motion and neck supple.      Right lower leg: No edema.      Left lower leg: Edema present.   Skin:     General: Skin is warm and dry.   Neurological:      General: No focal deficit present.      Mental Status: He is oriented to person, place, and time.      Sensory: No sensory deficit.      Motor: No weakness.   Psychiatric:         Mood and Affect: Mood normal.         Behavior: Behavior normal.         Fluids    Intake/Output Summary (Last 24 hours) at 6/12/2022 1125  Last data filed at 6/12/2022 0930  Gross per 24  hour   Intake 1120 ml   Output 450 ml   Net 670 ml       Laboratory  Recent Labs     06/10/22  1842 06/11/22  0234 06/12/22  0242   WBC 19.4* 13.8* 8.2   RBC 4.96 4.36* 3.98*   HEMOGLOBIN 15.5 14.1 12.7*   HEMATOCRIT 43.0 40.0* 36.8*   MCV 86.7 91.7 92.5   MCH 31.3 32.3 31.9   MCHC 36.0* 35.3 34.5   RDW 40.6 43.6 43.8   PLATELETCT 246 207 184   MPV 9.8 9.5 10.2     Recent Labs     06/10/22  1842 06/11/22  0234   SODIUM 140 137   POTASSIUM 5.2 3.9   CHLORIDE 103 103   CO2 18* 24   GLUCOSE 106* 100*   BUN 22 17   CREATININE 1.35 1.02   CALCIUM 10.3 8.6                   Imaging  US-EXTREMITY VENOUS LOWER UNILAT LEFT   Final Result      CT-HEAD W/O   Final Result      Head CT without contrast within normal limits. No evidence of acute cerebral infarction, hemorrhage or mass lesion.              Assessment/Plan  * Sepsis without acute organ dysfunction (HCC)- (present on admission)  Assessment & Plan  This is Sepsis Present on admission  SIRS criteria identified on my evaluation include: Tachycardia, with heart rate greater than 90 BPM and Leukocytosis, with WBC greater than 12,000  Source is left lower extremity cellulitis  Sepsis protocol initiated  Fluid resuscitation ordered per protocol  Crystalloid Fluid Administration: Fluid resuscitation ordered per standard protocol - 30 mL/kg per current or ideal body weight  IV antibiotics as appropriate for source of sepsis  Reassessment: I have reassessed the patient's hemodynamic status    Toxic metabolic encephalopathy- (present on admission)  Assessment & Plan  Secondary to methamphetamine use, sepsis and dehydration due to left lower extremity cellulitis  Symptoms improving with steroids wearing off and treatment with IV fluids  Treat underlying cause    Methamphetamine use (HCC)- (present on admission)  Assessment & Plan  UDS positive, counseled on cessation    Cellulitis of left lower extremity- (present on admission)  Assessment & Plan  Left calf red, hot, swollen,  tender.  No discharge  Will start on cefazolin, MRSA nares pending       VTE prophylaxis: enoxaparin ppx    I have performed a physical exam and reviewed and updated ROS and Plan today (6/12/2022). In review of yesterday's note (6/11/2022), there are no changes except as documented above.

## 2022-06-12 NOTE — CARE PLAN
The patient is Stable - Low risk of patient condition declining or worsening    Shift Goals  Clinical Goals: pain management  Patient Goals: pain management    Progress made toward(s) clinical / shift goals: PRN oxycodone ordered. No complain of pain at this time, medication given at AM shift.     Patient is not progressing towards the following goals:

## 2022-06-12 NOTE — DISCHARGE PLANNING
Anticipated Discharge Disposition: dc home    Action: Per KERA Leiva, pt requested homeless information and applying for insurance information. Pt provided with community resources packet for low cost housing, applying for health insurance at healthcare.gov, Medicaid.gov, [facility], and general community resource list, etc.     Barriers to Discharge:     Plan: dc to home with community resources.

## 2022-06-13 ENCOUNTER — APPOINTMENT (OUTPATIENT)
Dept: RADIOLOGY | Facility: MEDICAL CENTER | Age: 22
End: 2022-06-13
Attending: EMERGENCY MEDICINE
Payer: COMMERCIAL

## 2022-06-13 ENCOUNTER — HOSPITAL ENCOUNTER (EMERGENCY)
Facility: MEDICAL CENTER | Age: 22
End: 2022-06-13
Attending: EMERGENCY MEDICINE
Payer: COMMERCIAL

## 2022-06-13 VITALS
HEART RATE: 60 BPM | OXYGEN SATURATION: 96 % | RESPIRATION RATE: 16 BRPM | WEIGHT: 144.84 LBS | BODY MASS INDEX: 19.62 KG/M2 | TEMPERATURE: 97.6 F | HEIGHT: 72 IN | DIASTOLIC BLOOD PRESSURE: 60 MMHG | SYSTOLIC BLOOD PRESSURE: 102 MMHG

## 2022-06-13 DIAGNOSIS — F15.11 HISTORY OF METHAMPHETAMINE ABUSE (HCC): ICD-10-CM

## 2022-06-13 DIAGNOSIS — M25.472 LEFT ANKLE SWELLING: ICD-10-CM

## 2022-06-13 PROCEDURE — 99283 EMERGENCY DEPT VISIT LOW MDM: CPT

## 2022-06-13 PROCEDURE — 73610 X-RAY EXAM OF ANKLE: CPT | Mod: LT

## 2022-06-13 ASSESSMENT — FIBROSIS 4 INDEX: FIB4 SCORE: 0.89

## 2022-06-13 NOTE — PROGRESS NOTES
Discharge instructions reviewed with patient. IV removed on previous shift. Copy of discharge instructions sent with patient. Patient verbalized understanding. Escorted patient to [facility] to wait for ride. All questions answered. Belongings with patient at time of discharge.

## 2022-06-13 NOTE — ED PROVIDER NOTES
"ED Provider Note    CHIEF COMPLAINT  Chief Complaint   Patient presents with   • Other     Pt d/c'd yesterday from hospital. Pt asking for cab voucher to \"get out of here\"       HPI    Primary care provider: None  Means of arrival: Walk-in  History obtained from: Patient  History limited by: Nothing    Thomas Dancer is a 21 y.o. male who presents with request for cab voucher.  Apparently the patient was just hospitalized, was discharged last night, but did not have a ride to get to his home and so he has been wandering around the hospital for hours.  He finally decided to check into the ER asking for help with transportation.  He was recently admitted with concerns for cellulitis, discharged with Keflex and oxycodone but he has not filled these medications.  No new falls or injuries or trauma.  He states still has some mild swelling and achy pain to his left lower extremity but is able to bear weight.  Walking does slightly increase the pain to his left leg.  Denies any fevers or other recent illness or medical complaints.    REVIEW OF SYSTEMS  Constitutional: Negative for fever or chills.   HENT: Negative for headache or sore throat.  Respiratory: Negative for cough or shortness of breath.    Cardiovascular: Negative for chest pain or syncope.   Gastrointestinal: Negative for nausea, vomiting, or abdominal pain.   Musculoskeletal: Negative for back pain positive for left lower extremity pain and swelling.  Skin: Negative for open wounds or rash.   Neurological: Negative for sensory or motor changes.   Psychiatric/Behavioral: Negative for depression or suicidal ideas.       PAST MEDICAL HISTORY  Patient denies chronic past medical history.    PAST FAMILY HISTORY  History reviewed. No pertinent family history.    SOCIAL HISTORY  Social History     Tobacco Use   • Smoking status: Not on file   • Smokeless tobacco: Not on file   Substance and Sexual Activity   • Alcohol use: Not on file   • Drug use: Not on file   • " Sexual activity: Not on file       SURGICAL HISTORY  patient denies any surgical history    CURRENT MEDICATIONS  Home Medications     Reviewed by Bri Richter R.N. (Registered Nurse) on 06/13/22 at 0758  Med List Status: Partial   Medication Last Dose Status   cephALEXin (KEFLEX) 500 MG Cap  Active   oxyCODONE immediate-release (ROXICODONE) 5 MG Tab  Active                ALLERGIES  No Known Allergies    PHYSICAL EXAM  VITAL SIGNS: /60   Pulse 60   Temp 36.4 °C (97.6 °F) (Temporal)   Resp 16   Ht 1.829 m (6')   Wt 65.7 kg (144 lb 13.5 oz)   SpO2 96%   BMI 19.64 kg/m²    Pulse ox interpretation: On room air, I interpret this pulse ox as normal.  Constitutional: No distress. Well-nourished.  HENT: Head is atraumatic. Mucous membranes moist.   Eyes: Conjunctivae are normal. EOMI.   Respiratory: No respiratory distress, wheezes, or rhonchi.    Cardiovascular: RRR, no m/r/g.  Abdomen: Soft, nontender.  Musculoskeletal: Normal range of motion. Mild swelling to left ankle area, minimally tender to palpation.  Neurological: Alert. No focal weakness or asymmetry.   Skin: No rash. No Pallor.   Psych: Appropriate mood. Normal affect.      DIAGNOSTIC STUDIES / PROCEDURES      RADIOLOGY  DX-ANKLE 3+ VIEWS LEFT   Final Result      1. Circumferential left ankle soft tissue swelling.   2. No fracture, subluxation or joint effusion.          COURSE & MEDICAL DECISION MAKING    This is a 21 y.o. male who presents with request for help with transportation home, discharged from hospital last night.    Differential Diagnosis includes but is not limited to:  Cellulitis, fracture, dislocation, sprain, inability to care for self    ED Course:  21-year-old male coming in with the above presentation.  Nontoxic-appearing no fevers vital signs reassuring, no clinical signs of cellulitis or serious infection.  Repeat x-ray obtained shows nothing acute, social work will be contacted to see if we can get him a cab or bus  transportation.  No life threat at this time he has his prescriptions for his antibiotics and pain medication, he is instructed to return immediately for any new or worsening symptoms.    Medications - No data to display    FINAL IMPRESSION  1. Left ankle swelling    2. History of methamphetamine abuse (HCC)        PRESCRIPTIONS  Discharge Medication List as of 6/13/2022  9:15 AM          FOLLOW UP  [facility], Emergency Dept  [address] [id number] [phone]  Today  If you have ANY new or worse symptoms!    [facility]  [address] [facility] [address]  [phone]  Schedule an appointment as soon as possible for a visit in 2 days  for recheck and routine health care    Chilango Morales M.D.  [address] [address]  [phone]    Schedule an appointment as soon as possible for a visit in 1 week      -DISCHARGE-      Pertinent Imaging studies reviewed and verified by myself, as well as nursing notes and the patient's past medical, family, and social histories (See chart for details).    Portions of this record were made with voice recognition software.  Despite my review, spelling/grammar/context errors may still remain.  Interpretation of this chart should be taken in this context.    Electronically signed by Yobani Rios M.D. on 6/16/2022 at 6:03 PM.

## 2022-06-13 NOTE — ED NOTES
Patient states that he would like a taxi voucher to the [address] in [place]. Does not remember apartment number. Address:    [address] [place] [address]    SW notified.  ERP Dr. Rios at bedside.

## 2022-06-13 NOTE — ED TRIAGE NOTES
"Chief Complaint   Patient presents with   • Other     Pt d/c'd yesterday from hospital. Pt asking for cab voucher to \"get out of here\"       Pt ambulatory into triage room for above. Pt was supposed to have crutches when d/c'd but pt does not have them. Pt aox4, GCS 15.      /60   Pulse 60   Temp 36.4 °C (97.6 °F) (Temporal)   Resp 16   Ht 1.829 m (6')   Wt 65.7 kg (144 lb 13.5 oz)   SpO2 96%   BMI 19.64 kg/m²     "

## 2022-06-13 NOTE — ED NOTES
Patient from [facility] to [facility] ambulatory with steady gait accompanied by ED Tech. Chart up for ERP.

## 2022-06-13 NOTE — DISCHARGE INSTRUCTIONS
You were seen for request for cab voucher with persistent left ankle pain and swelling, x-ray shows nothing acute.  Take the medicines you have already been prescribed.  Return if any worse.

## 2022-06-13 NOTE — ED NOTES
MANUELITOE walking boot placed to patient as per Dr. Rios. Clean clothing provided to patient.  Taxi voucher provided by [facility]

## 2022-07-22 ENCOUNTER — HOSPITAL ENCOUNTER (INPATIENT)
Facility: MEDICAL CENTER | Age: 22
LOS: 3 days | DRG: 557 | End: 2022-07-25
Attending: EMERGENCY MEDICINE | Admitting: STUDENT IN AN ORGANIZED HEALTH CARE EDUCATION/TRAINING PROGRAM
Payer: COMMERCIAL

## 2022-07-22 ENCOUNTER — APPOINTMENT (OUTPATIENT)
Dept: RADIOLOGY | Facility: MEDICAL CENTER | Age: 22
DRG: 557 | End: 2022-07-22
Attending: STUDENT IN AN ORGANIZED HEALTH CARE EDUCATION/TRAINING PROGRAM
Payer: COMMERCIAL

## 2022-07-22 DIAGNOSIS — G92.9 TOXIC ENCEPHALOPATHY: ICD-10-CM

## 2022-07-22 DIAGNOSIS — F15.10 METHAMPHETAMINE ABUSE (HCC): ICD-10-CM

## 2022-07-22 DIAGNOSIS — M62.82 NON-TRAUMATIC RHABDOMYOLYSIS: ICD-10-CM

## 2022-07-22 DIAGNOSIS — R41.82 ALTERED MENTAL STATUS, UNSPECIFIED ALTERED MENTAL STATUS TYPE: ICD-10-CM

## 2022-07-22 PROBLEM — G93.41 ACUTE METABOLIC ENCEPHALOPATHY: Status: ACTIVE | Noted: 2022-06-11

## 2022-07-22 PROBLEM — E87.6 HYPOKALEMIA: Status: ACTIVE | Noted: 2022-07-22

## 2022-07-22 LAB
AMPHET UR QL SCN: POSITIVE
ANION GAP SERPL CALC-SCNC: 16 MMOL/L (ref 7–16)
BARBITURATES UR QL SCN: NEGATIVE
BASOPHILS # BLD AUTO: 0.1 % (ref 0–1.8)
BASOPHILS # BLD: 0.01 K/UL (ref 0–0.12)
BENZODIAZ UR QL SCN: NEGATIVE
BUN SERPL-MCNC: 17 MG/DL (ref 8–22)
BZE UR QL SCN: NEGATIVE
CALCIUM SERPL-MCNC: 9.7 MG/DL (ref 8.5–10.5)
CANNABINOIDS UR QL SCN: NEGATIVE
CHLORIDE SERPL-SCNC: 106 MMOL/L (ref 96–112)
CK SERPL-CCNC: 3444 U/L (ref 0–154)
CO2 SERPL-SCNC: 21 MMOL/L (ref 20–33)
CREAT SERPL-MCNC: 0.85 MG/DL (ref 0.5–1.4)
EOSINOPHIL # BLD AUTO: 0.01 K/UL (ref 0–0.51)
EOSINOPHIL NFR BLD: 0.1 % (ref 0–6.9)
ERYTHROCYTE [DISTWIDTH] IN BLOOD BY AUTOMATED COUNT: 39.9 FL (ref 35.9–50)
ETHANOL BLD-MCNC: <10.1 MG/DL
GFR SERPLBLD CREATININE-BSD FMLA CKD-EPI: 126 ML/MIN/1.73 M 2
GLUCOSE SERPL-MCNC: 79 MG/DL (ref 65–99)
HCT VFR BLD AUTO: 44.5 % (ref 42–52)
HGB BLD-MCNC: 16.1 G/DL (ref 14–18)
IMM GRANULOCYTES # BLD AUTO: 0.04 K/UL (ref 0–0.11)
IMM GRANULOCYTES NFR BLD AUTO: 0.4 % (ref 0–0.9)
LYMPHOCYTES # BLD AUTO: 1.82 K/UL (ref 1–4.8)
LYMPHOCYTES NFR BLD: 18.3 % (ref 22–41)
MCH RBC QN AUTO: 31.4 PG (ref 27–33)
MCHC RBC AUTO-ENTMCNC: 36.2 G/DL (ref 33.7–35.3)
MCV RBC AUTO: 86.7 FL (ref 81.4–97.8)
METHADONE UR QL SCN: NEGATIVE
MONOCYTES # BLD AUTO: 1.07 K/UL (ref 0–0.85)
MONOCYTES NFR BLD AUTO: 10.8 % (ref 0–13.4)
NEUTROPHILS # BLD AUTO: 6.98 K/UL (ref 1.82–7.42)
NEUTROPHILS NFR BLD: 70.3 % (ref 44–72)
NRBC # BLD AUTO: 0 K/UL
NRBC BLD-RTO: 0 /100 WBC
OPIATES UR QL SCN: NEGATIVE
OXYCODONE UR QL SCN: NEGATIVE
PCP UR QL SCN: NEGATIVE
PLATELET # BLD AUTO: 267 K/UL (ref 164–446)
PMV BLD AUTO: 9.3 FL (ref 9–12.9)
POTASSIUM SERPL-SCNC: 3.5 MMOL/L (ref 3.6–5.5)
PROPOXYPH UR QL SCN: NEGATIVE
RBC # BLD AUTO: 5.13 M/UL (ref 4.7–6.1)
SODIUM SERPL-SCNC: 143 MMOL/L (ref 135–145)
WBC # BLD AUTO: 9.9 K/UL (ref 4.8–10.8)

## 2022-07-22 PROCEDURE — 99285 EMERGENCY DEPT VISIT HI MDM: CPT

## 2022-07-22 PROCEDURE — 80048 BASIC METABOLIC PNL TOTAL CA: CPT

## 2022-07-22 PROCEDURE — 700105 HCHG RX REV CODE 258: Performed by: STUDENT IN AN ORGANIZED HEALTH CARE EDUCATION/TRAINING PROGRAM

## 2022-07-22 PROCEDURE — 99223 1ST HOSP IP/OBS HIGH 75: CPT | Performed by: STUDENT IN AN ORGANIZED HEALTH CARE EDUCATION/TRAINING PROGRAM

## 2022-07-22 PROCEDURE — 70450 CT HEAD/BRAIN W/O DYE: CPT

## 2022-07-22 PROCEDURE — 85025 COMPLETE CBC W/AUTO DIFF WBC: CPT

## 2022-07-22 PROCEDURE — 700111 HCHG RX REV CODE 636 W/ 250 OVERRIDE (IP): Performed by: EMERGENCY MEDICINE

## 2022-07-22 PROCEDURE — 770001 HCHG ROOM/CARE - MED/SURG/GYN PRIV*

## 2022-07-22 PROCEDURE — 700102 HCHG RX REV CODE 250 W/ 637 OVERRIDE(OP): Performed by: HOSPITALIST

## 2022-07-22 PROCEDURE — 96375 TX/PRO/DX INJ NEW DRUG ADDON: CPT

## 2022-07-22 PROCEDURE — 80307 DRUG TEST PRSMV CHEM ANLYZR: CPT

## 2022-07-22 PROCEDURE — 36415 COLL VENOUS BLD VENIPUNCTURE: CPT

## 2022-07-22 PROCEDURE — 96374 THER/PROPH/DIAG INJ IV PUSH: CPT

## 2022-07-22 PROCEDURE — 700105 HCHG RX REV CODE 258: Performed by: EMERGENCY MEDICINE

## 2022-07-22 PROCEDURE — 82550 ASSAY OF CK (CPK): CPT

## 2022-07-22 PROCEDURE — A9270 NON-COVERED ITEM OR SERVICE: HCPCS | Performed by: HOSPITALIST

## 2022-07-22 PROCEDURE — 82077 ASSAY SPEC XCP UR&BREATH IA: CPT

## 2022-07-22 RX ORDER — LORAZEPAM 2 MG/ML
2 INJECTION INTRAMUSCULAR ONCE
Status: DISCONTINUED | OUTPATIENT
Start: 2022-07-22 | End: 2022-07-22

## 2022-07-22 RX ORDER — SODIUM CHLORIDE 9 MG/ML
1000 INJECTION, SOLUTION INTRAVENOUS ONCE
Status: COMPLETED | OUTPATIENT
Start: 2022-07-22 | End: 2022-07-22

## 2022-07-22 RX ORDER — ENOXAPARIN SODIUM 100 MG/ML
40 INJECTION SUBCUTANEOUS DAILY
Status: DISCONTINUED | OUTPATIENT
Start: 2022-07-22 | End: 2022-07-25 | Stop reason: HOSPADM

## 2022-07-22 RX ORDER — HALOPERIDOL 5 MG/ML
5 INJECTION INTRAMUSCULAR ONCE
Status: COMPLETED | OUTPATIENT
Start: 2022-07-22 | End: 2022-07-22

## 2022-07-22 RX ORDER — AMOXICILLIN 250 MG
2 CAPSULE ORAL 2 TIMES DAILY
Status: DISCONTINUED | OUTPATIENT
Start: 2022-07-22 | End: 2022-07-25 | Stop reason: HOSPADM

## 2022-07-22 RX ORDER — BISACODYL 10 MG
10 SUPPOSITORY, RECTAL RECTAL
Status: DISCONTINUED | OUTPATIENT
Start: 2022-07-22 | End: 2022-07-25 | Stop reason: HOSPADM

## 2022-07-22 RX ORDER — ONDANSETRON 4 MG/1
4 TABLET, ORALLY DISINTEGRATING ORAL EVERY 4 HOURS PRN
Status: DISCONTINUED | OUTPATIENT
Start: 2022-07-22 | End: 2022-07-25 | Stop reason: HOSPADM

## 2022-07-22 RX ORDER — ACETAMINOPHEN 325 MG/1
650 TABLET ORAL EVERY 6 HOURS PRN
Status: DISCONTINUED | OUTPATIENT
Start: 2022-07-22 | End: 2022-07-25 | Stop reason: HOSPADM

## 2022-07-22 RX ORDER — PROMETHAZINE HYDROCHLORIDE 25 MG/1
12.5-25 TABLET ORAL EVERY 4 HOURS PRN
Status: DISCONTINUED | OUTPATIENT
Start: 2022-07-22 | End: 2022-07-25 | Stop reason: HOSPADM

## 2022-07-22 RX ORDER — PROCHLORPERAZINE EDISYLATE 5 MG/ML
5-10 INJECTION INTRAMUSCULAR; INTRAVENOUS EVERY 4 HOURS PRN
Status: DISCONTINUED | OUTPATIENT
Start: 2022-07-22 | End: 2022-07-25 | Stop reason: HOSPADM

## 2022-07-22 RX ORDER — SODIUM CHLORIDE 9 MG/ML
INJECTION, SOLUTION INTRAVENOUS CONTINUOUS
Status: DISCONTINUED | OUTPATIENT
Start: 2022-07-22 | End: 2022-07-25 | Stop reason: HOSPADM

## 2022-07-22 RX ORDER — MIDAZOLAM HYDROCHLORIDE 1 MG/ML
2 INJECTION INTRAMUSCULAR; INTRAVENOUS ONCE
Status: COMPLETED | OUTPATIENT
Start: 2022-07-22 | End: 2022-07-22

## 2022-07-22 RX ORDER — POLYETHYLENE GLYCOL 3350 17 G/17G
1 POWDER, FOR SOLUTION ORAL
Status: DISCONTINUED | OUTPATIENT
Start: 2022-07-22 | End: 2022-07-25 | Stop reason: HOSPADM

## 2022-07-22 RX ORDER — ONDANSETRON 2 MG/ML
4 INJECTION INTRAMUSCULAR; INTRAVENOUS EVERY 4 HOURS PRN
Status: DISCONTINUED | OUTPATIENT
Start: 2022-07-22 | End: 2022-07-25 | Stop reason: HOSPADM

## 2022-07-22 RX ORDER — DIPHENHYDRAMINE HYDROCHLORIDE 50 MG/ML
25 INJECTION INTRAMUSCULAR; INTRAVENOUS ONCE
Status: COMPLETED | OUTPATIENT
Start: 2022-07-22 | End: 2022-07-22

## 2022-07-22 RX ORDER — HYDRALAZINE HYDROCHLORIDE 20 MG/ML
10 INJECTION INTRAMUSCULAR; INTRAVENOUS EVERY 4 HOURS PRN
Status: DISCONTINUED | OUTPATIENT
Start: 2022-07-22 | End: 2022-07-25 | Stop reason: HOSPADM

## 2022-07-22 RX ORDER — POTASSIUM CHLORIDE 20 MEQ/1
20 TABLET, EXTENDED RELEASE ORAL ONCE
Status: COMPLETED | OUTPATIENT
Start: 2022-07-22 | End: 2022-07-22

## 2022-07-22 RX ORDER — PROMETHAZINE HYDROCHLORIDE 25 MG/1
12.5-25 SUPPOSITORY RECTAL EVERY 4 HOURS PRN
Status: DISCONTINUED | OUTPATIENT
Start: 2022-07-22 | End: 2022-07-25 | Stop reason: HOSPADM

## 2022-07-22 RX ADMIN — SODIUM CHLORIDE: 9 INJECTION, SOLUTION INTRAVENOUS at 14:15

## 2022-07-22 RX ADMIN — SODIUM CHLORIDE: 9 INJECTION, SOLUTION INTRAVENOUS at 06:12

## 2022-07-22 RX ADMIN — MIDAZOLAM HYDROCHLORIDE 2 MG: 1 INJECTION, SOLUTION INTRAMUSCULAR; INTRAVENOUS at 04:44

## 2022-07-22 RX ADMIN — HALOPERIDOL LACTATE 5 MG: 5 INJECTION, SOLUTION INTRAMUSCULAR at 04:44

## 2022-07-22 RX ADMIN — DIPHENHYDRAMINE HYDROCHLORIDE 25 MG: 50 INJECTION INTRAMUSCULAR; INTRAVENOUS at 04:45

## 2022-07-22 RX ADMIN — SODIUM CHLORIDE 1000 ML: 9 INJECTION, SOLUTION INTRAVENOUS at 05:27

## 2022-07-22 RX ADMIN — POTASSIUM CHLORIDE 20 MEQ: 1500 TABLET, EXTENDED RELEASE ORAL at 11:11

## 2022-07-22 ASSESSMENT — ENCOUNTER SYMPTOMS
NEUROLOGICAL NEGATIVE: 1
DIZZINESS: 0
COUGH: 0
VOMITING: 0
BLURRED VISION: 0
CARDIOVASCULAR NEGATIVE: 1
SHORTNESS OF BREATH: 0
HEADACHES: 0
GASTROINTESTINAL NEGATIVE: 1
WEIGHT LOSS: 0
EYES NEGATIVE: 1
PALPITATIONS: 0
DEPRESSION: 0
NAUSEA: 0
CONSTITUTIONAL NEGATIVE: 1
BRUISES/BLEEDS EASILY: 0
SORE THROAT: 0
DIAPHORESIS: 0
MYALGIAS: 0
FEVER: 0
MUSCULOSKELETAL NEGATIVE: 1
CHILLS: 0
RESPIRATORY NEGATIVE: 1
ABDOMINAL PAIN: 0
FOCAL WEAKNESS: 0
WEAKNESS: 0

## 2022-07-22 ASSESSMENT — PAIN DESCRIPTION - PAIN TYPE
TYPE: ACUTE PAIN
TYPE: ACUTE PAIN

## 2022-07-22 ASSESSMENT — PATIENT HEALTH QUESTIONNAIRE - PHQ9
SUM OF ALL RESPONSES TO PHQ9 QUESTIONS 1 AND 2: 0
2. FEELING DOWN, DEPRESSED, IRRITABLE, OR HOPELESS: NOT AT ALL
1. LITTLE INTEREST OR PLEASURE IN DOING THINGS: NOT AT ALL

## 2022-07-22 ASSESSMENT — LIFESTYLE VARIABLES: SUBSTANCE_ABUSE: 1

## 2022-07-22 ASSESSMENT — FIBROSIS 4 INDEX: FIB4 SCORE: 0.89

## 2022-07-22 NOTE — ASSESSMENT & PLAN NOTE
Likely secondary to methamphetamine abuse  Complicated by rhabdomyolysis  Continue supportive care  Neuro exam remains intact  No acute abnormalities found on CT  Now resolved

## 2022-07-22 NOTE — PROGRESS NOTES
"[facility] Medicine Daily Progress Note    Date of Service  7/22/2022    Chief Complaint  Thomas James Dancer V is a 21 y.o. male admitted 7/22/2022 with intoxication, altered mental status    Hospital Course  Patient is a 21-year-old homeless male with a past medical history of methamphetamine abuse. He presented to [facility] on 7/22/2022 with altered level of consciousness.  Patient unable to provide history, so this was obtained from chart review, and from discussion with ED staff.  Apparently, patient was at the [facility] and requested staff to call EMS.  Patient was noted to be wandering around the parking lot and was unable to answer questions.    In the emergency department vital signs with fever at 100.6, tachycardia at 115, tachypnea in the 20s and SBP at 150.  Patient saturating well room air.  Chemistry normal.  Chemistry with hypokalemia and CPK in the 3400s.  Diagnostic alcohol low.  Patient was admitted for acute metabolic encephalopathy secondary to likely methamphetamine abuse as well as rhabdomyolysis which requires aggressive IV hydration.    Interval Problem Update  Seen in the ER, he is more alert and appropriate but remains confused and is a poor historian. He is oriented to person, place and year but did not know it was July. He tells me \"I've had problems with drugs but I don't like to talk about it\". He denies pain, no dysuria or flank pain, no chest pain, no n/v. No sob. ROS otherwise negative    I have discussed this patient's plan of care and discharge plan at IDT rounds today with Case Management, Nursing, Nursing leadership, and other members of the IDT team.    Consultants/Specialty    Code Status  Full Code    Disposition  Patient is not medically cleared for discharge.   Anticipate discharge to be determined    Review of Systems  Review of Systems   Constitutional: Negative.  Negative for chills, diaphoresis, fever, malaise/fatigue and weight loss.   HENT: Negative.  Negative for sore " throat.    Eyes: Negative.  Negative for blurred vision.   Respiratory: Negative.  Negative for cough and shortness of breath.    Cardiovascular: Negative.  Negative for chest pain, palpitations and leg swelling.   Gastrointestinal: Negative.  Negative for abdominal pain, nausea and vomiting.   Genitourinary: Negative.  Negative for dysuria.   Musculoskeletal: Negative.  Negative for myalgias.   Skin: Negative.  Negative for itching and rash.   Neurological: Negative.  Negative for dizziness, focal weakness, weakness and headaches.   Endo/Heme/Allergies: Negative.  Does not bruise/bleed easily.   Psychiatric/Behavioral: Positive for substance abuse. Negative for depression and suicidal ideas.   All other systems reviewed and are negative.       Physical Exam  Temp:  [36.6 °C (97.9 °F)-38.1 °C (100.6 °F)] 36.6 °C (97.9 °F)  Pulse:  [] 78  Resp:  [14-20] 14  BP: (123-150)/(78-98) 123/78  SpO2:  [95 %-97 %] 95 %    Physical Exam  Vitals and nursing note reviewed. Exam conducted with a chaperone present.   Constitutional:       General: He is not in acute distress.     Appearance: Normal appearance. He is not diaphoretic.   HENT:      Head: Normocephalic.      Nose: Nose normal.      Mouth/Throat:      Mouth: Mucous membranes are moist.   Eyes:      Pupils: Pupils are equal, round, and reactive to light.   Cardiovascular:      Rate and Rhythm: Normal rate and regular rhythm.      Pulses: Normal pulses.      Heart sounds: Normal heart sounds.   Pulmonary:      Effort: Pulmonary effort is normal.      Breath sounds: Normal breath sounds.   Abdominal:      General: Abdomen is flat. Bowel sounds are normal.      Palpations: Abdomen is soft.   Musculoskeletal:         General: No swelling or deformity. Normal range of motion.   Skin:     General: Skin is warm and dry.      Capillary Refill: Capillary refill takes less than 2 seconds.   Neurological:      General: No focal deficit present.      Mental Status: He is  alert.      Cranial Nerves: No cranial nerve deficit.   Psychiatric:         Attention and Perception: He does not perceive auditory or visual hallucinations.         Speech: Speech is tangential.         Behavior: Behavior is cooperative.         Thought Content: Thought content is not paranoid or delusional. Thought content does not include homicidal or suicidal ideation. Thought content does not include homicidal plan.         Cognition and Memory: Cognition is impaired. Memory is impaired. He exhibits impaired recent memory and impaired remote memory.         Fluids    Intake/Output Summary (Last 24 hours) at 7/22/2022 0731  Last data filed at 7/22/2022 0605  Gross per 24 hour   Intake 1000 ml   Output --   Net 1000 ml       Laboratory  Recent Labs     07/22/22  0435   WBC 9.9   RBC 5.13   HEMOGLOBIN 16.1   HEMATOCRIT 44.5   MCV 86.7   MCH 31.4   MCHC 36.2*   RDW 39.9   PLATELETCT 267   MPV 9.3     Recent Labs     07/22/22  0435   SODIUM 143   POTASSIUM 3.5*   CHLORIDE 106   CO2 21   GLUCOSE 79   BUN 17   CREATININE 0.85   CALCIUM 9.7                   Imaging  CT-HEAD W/O   Final Result      No acute intracranial abnormality.              Assessment/Plan  * Rhabdomyolysis- (present on admission)  Assessment & Plan  Secondary to likely methamphetamine abuse  Renal function intact  Aggressive IV hydration  Trend    Acute metabolic encephalopathy- (present on admission)  Assessment & Plan  Likely secondary to methamphetamine abuse  Complicated by rhabdomyolysis  Aggressive IV hydration  Pending UDS  Head CT  Trend CPK         VTE prophylaxis: enoxaparin ppx    I have performed a physical exam and reviewed and updated ROS and Plan today (7/22/2022). In review of yesterday's note (7/21/2022), there are no changes except as documented above.

## 2022-07-22 NOTE — ED NOTES
Blood drawn and sent to lab. Pt attempted to provide urine sample but unable to urinate at this time.

## 2022-07-22 NOTE — ASSESSMENT & PLAN NOTE
Secondary to likely methamphetamine abuse  Renal function normal  Continue IV hydration  improving  contiue to melquiades

## 2022-07-22 NOTE — ED NOTES
Unable to complete Med Rec at this time. Pt unable to participate in interview at this time.  Unable to assess allergies.

## 2022-07-22 NOTE — H&P
Hospital Medicine History & Physical Note    Date of Service  7/22/2022    Primary Care Physician  Pcp Pt States None    Consultants  None    Code Status  Full Code    Chief Complaint  Chief Complaint   Patient presents with   • Drug Ingestion   • ALOC       History of Presenting Illness  21-year-old homeless male with a past medical history of methamphetamine abuse presented to emergency department via EMS on 7/22/2022 for altered level of consciousness.  Patient unable to provide HPI as such, this was obtained from chart review, and discussion with ED staff.  Apparently, patient was at the [facility] and requested staff to [place] to call EMS.  Patient was noted to wander around the parking lot and was unable to answer questions.    At the emergency department vital signs with fever at 100.6, tachycardia at 115, tachypnea in the 20s and SBP at 150.  Patient saturating well room air.  Chemistry normal.  Chemistry with hypokalemia and CPK in the 3400s.  Diagnostic alcohol low.  Patient was admitted for acute metabolic encephalopathy secondary to likely methamphetamine abuse as well as rhabdomyolysis which requires aggressive IV hydration.    I discussed the plan of care with bedside RN.    Review of Systems  Review of Systems   Unable to perform ROS: Mental status change     Past Medical History  As above    Surgical History  Unable to obtain secondary to altered mental status    Family History  Family history reviewed with patient. There is no family history that is pertinent to the chief complaint.     Social History   reports that he has been smoking. He has never used smokeless tobacco. He reports current alcohol use. He reports current drug use.    Allergies  Allergies   Allergen Reactions   • Bee Anaphylaxis   • Bee Swelling     Pt reports that he swells up where he gets stung        Medications  Prior to Admission Medications   Prescriptions Last Dose Informant Patient Reported? Taking?   Ascorbic Acid (VITAMIN  C PO)  Patient Yes No   Sig: Take 1 Tablet by mouth every day.      Facility-Administered Medications: None       Physical Exam  Temp:  [36.6 °C (97.9 °F)-38.1 °C (100.6 °F)] 36.6 °C (97.9 °F)  Pulse:  [] 78  Resp:  [14-20] 14  BP: (123-150)/(78-98) 123/78  SpO2:  [95 %-97 %] 95 %  Blood Pressure: (!) 150/98   Temperature: (!) 38.1 °C (100.6 °F)   Pulse: 87   Respiration: 14   Pulse Oximetry: 95 %       Physical Exam  Constitutional:       General: He is not in acute distress.     Appearance: Normal appearance. He is ill-appearing.      Comments: Poor hygiene   HENT:      Head: Normocephalic and atraumatic.      Nose: Nose normal. No congestion.      Mouth/Throat:      Mouth: Mucous membranes are moist.   Eyes:      Extraocular Movements: Extraocular movements intact.      Pupils: Pupils are equal, round, and reactive to light.   Cardiovascular:      Rate and Rhythm: Normal rate and regular rhythm.      Pulses: Normal pulses.      Heart sounds: Normal heart sounds.   Pulmonary:      Effort: Pulmonary effort is normal.      Breath sounds: Normal breath sounds.   Abdominal:      General: Bowel sounds are normal. There is no distension.      Palpations: Abdomen is soft.      Tenderness: There is no abdominal tenderness. There is no guarding or rebound.   Musculoskeletal:         General: No swelling. Normal range of motion.      Cervical back: Normal range of motion and neck supple.   Skin:     General: Skin is warm.      Coloration: Skin is not jaundiced.   Neurological:      Mental Status: He is alert. He is disoriented.      Cranial Nerves: No cranial nerve deficit.         Laboratory:  Recent Labs     07/22/22  0435   WBC 9.9   RBC 5.13   HEMOGLOBIN 16.1   HEMATOCRIT 44.5   MCV 86.7   MCH 31.4   MCHC 36.2*   RDW 39.9   PLATELETCT 267   MPV 9.3     Recent Labs     07/22/22  0435   SODIUM 143   POTASSIUM 3.5*   CHLORIDE 106   CO2 21   GLUCOSE 79   BUN 17   CREATININE 0.85   CALCIUM 9.7     Recent Labs      07/22/22  0435   GLUCOSE 79         No results for input(s): NTPROBNP in the last 72 hours.      No results for input(s): TROPONINT in the last 72 hours.    Imaging:  CT-HEAD W/O    (Results Pending)     Assessment/Plan:  Justification for Admission Status  I anticipate this patient will require at least two midnights for appropriate medical management, necessitating inpatient admission because Of below    * Rhabdomyolysis- (present on admission)  Assessment & Plan  Secondary to likely methamphetamine abuse  Renal function intact  Aggressive IV hydration  Trend    Acute metabolic encephalopathy- (present on admission)  Assessment & Plan  Likely secondary to methamphetamine abuse  Complicated by rhabdomyolysis  Aggressive IV hydration  Pending UDS  Head CT  Trend CPK      VTE prophylaxis: enoxaparin ppx

## 2022-07-22 NOTE — ED TRIAGE NOTES
Chief Complaint   Patient presents with   • Drug Ingestion   • ALOC     Pt BIB EMS from [facility], pt asked staff to call 911. On EMS arrival pt was attempting to karate chop at the air and wandering through the parking lot. Pt not currently answering questions. Pt is very restless and twitching but awake and maintaining his airway. PIV placed by EMS and 250ml NS given in route. FSBG 86. Chart up for ERP.     BP (!) 150/98   Pulse (!) 115   Temp (!) 38.1 °C (100.6 °F) (Temporal)   Resp 20   Wt 65.3 kg (144 lb)   SpO2 97%   BMI 19.53 kg/m²

## 2022-07-22 NOTE — ED PROVIDER NOTES
"ED Provider Note    Scribed for Burak Duke M.D. by Jodi Vital. 7/22/2022  4:09 AM    Primary care provider: Pcp Pt States None  Means of arrival: EMS  History obtained from: Patient  History limited by: Drug Intoxication    CHIEF COMPLAINT  Chief Complaint   Patient presents with   • Drug Ingestion   • ALOC     HPI  Thomas James Dancer V is a 21 y.o. male who presents to the Emergency Department via EMS to bedside as an altered patient secondary to drug intoxication. On initial examination, patient is non verbal, does not respond to questions, and continues to try and pick at his upper extremities. Per EMS, the patient was sent from [facility] after \"attempting to karate chop at the air\" and loitering in the parking lot. He does not exhibit any symptoms of fever.     HPI limited to patient's ALOC due to drug ingestion.    REVIEW OF SYSTEMS  Pertinent positives include drug ingestion.   Pertinent negatives include no fever.  All other systems reviewed and negative. See HPI for further details.   ROS limited to patient's ALOC due to drug ingestion.    PAST MEDICAL HISTORY   None noted on chart review    SURGICAL HISTORY  patient denies any surgical history    SOCIAL HISTORY  Social History     Tobacco Use   • Smoking status: Current Every Day Smoker   • Smokeless tobacco: Never Used   Vaping Use   • Vaping Use: Never used   Substance Use Topics   • Alcohol use: Yes   • Drug use: Yes     Comment: meth      Social History     Substance and Sexual Activity   Drug Use Yes    Comment: meth       FAMILY HISTORY  None noted    CURRENT MEDICATIONS  Current Outpatient Medications   Medication Instructions   • Ascorbic Acid (VITAMIN C PO) 1 Tablet, Oral, DAILY     ALLERGIES  Allergies   Allergen Reactions   • Bee Anaphylaxis   • Bee Swelling     Pt reports that he swells up where he gets stung        PHYSICAL EXAM  VITAL SIGNS: BP (!) 150/98   Pulse (!) 115   Temp (!) 38.1 °C (100.6 °F) (Temporal)   Resp 20   Wt " 65.3 kg (144 lb)   SpO2 97%   BMI 19.53 kg/m²   Nursing note and vitals reviewed.  Constitutional: Well-developed and well-nourished. No distress.   HENT: Head is normocephalic and atraumatic. Oropharynx is clear and moist without exudate or erythema.   Eyes: Pupils are equal, round, and reactive to light. Conjunctiva are normal.   Cardiovascular: Normal rate and regular rhythm. No murmur heard. Normal radial pulses.  Pulmonary/Chest: Breath sounds normal. No wheezes or rales.   Abdominal: Soft and non-tender. No distention    Musculoskeletal: Extremities exhibit normal range of motion without edema or tenderness.   Neurological: Awake, alert and oriented to person, place, and time. No focal deficits noted.  Skin: Skin is warm and dry. No rash.   Psychiatric: Sitting in bed responding to internal stimulus. Awake and alert but does not answer questions. Seems to be grabbing at things.    DIAGNOSTIC STUDIES / PROCEDURES    LABS  Results for orders placed or performed during the hospital encounter of 07/22/22   CBC WITH DIFFERENTIAL   Result Value Ref Range    WBC 9.9 4.8 - 10.8 K/uL    RBC 5.13 4.70 - 6.10 M/uL    Hemoglobin 16.1 14.0 - 18.0 g/dL    Hematocrit 44.5 42.0 - 52.0 %    MCV 86.7 81.4 - 97.8 fL    MCH 31.4 27.0 - 33.0 pg    MCHC 36.2 (H) 33.7 - 35.3 g/dL    RDW 39.9 35.9 - 50.0 fL    Platelet Count 267 164 - 446 K/uL    MPV 9.3 9.0 - 12.9 fL    Neutrophils-Polys 70.30 44.00 - 72.00 %    Lymphocytes 18.30 (L) 22.00 - 41.00 %    Monocytes 10.80 0.00 - 13.40 %    Eosinophils 0.10 0.00 - 6.90 %    Basophils 0.10 0.00 - 1.80 %    Immature Granulocytes 0.40 0.00 - 0.90 %    Nucleated RBC 0.00 /100 WBC    Neutrophils (Absolute) 6.98 1.82 - 7.42 K/uL    Lymphs (Absolute) 1.82 1.00 - 4.80 K/uL    Monos (Absolute) 1.07 (H) 0.00 - 0.85 K/uL    Eos (Absolute) 0.01 0.00 - 0.51 K/uL    Baso (Absolute) 0.01 0.00 - 0.12 K/uL    Immature Granulocytes (abs) 0.04 0.00 - 0.11 K/uL    NRBC (Absolute) 0.00 K/uL   BASIC  METABOLIC PANEL   Result Value Ref Range    Sodium 143 135 - 145 mmol/L    Potassium 3.5 (L) 3.6 - 5.5 mmol/L    Chloride 106 96 - 112 mmol/L    Co2 21 20 - 33 mmol/L    Glucose 79 65 - 99 mg/dL    Bun 17 8 - 22 mg/dL    Creatinine 0.85 0.50 - 1.40 mg/dL    Calcium 9.7 8.5 - 10.5 mg/dL    Anion Gap 16.0 7.0 - 16.0   CREATINE KINASE   Result Value Ref Range    CPK Total 3444 (HH) 0 - 154 U/L   URINE DRUG SCREEN   Result Value Ref Range    Amphetamines Urine Positive (A) Negative    Barbiturates Negative Negative    Benzodiazepines Negative Negative    Cocaine Metabolite Negative Negative    Methadone Negative Negative    Opiates Negative Negative    Oxycodone Negative Negative    Phencyclidine -Pcp Negative Negative    Propoxyphene Negative Negative    Cannabinoid Metab Negative Negative   DIAGNOSTIC ALCOHOL   Result Value Ref Range    Diagnostic Alcohol <10.1 <10.1 mg/dL   ESTIMATED GFR   Result Value Ref Range    GFR (CKD-EPI) 126 >60 mL/min/1.73 m 2   All labs reviewed by me.    COURSE & MEDICAL DECISION MAKING  Nursing notes, VS, PMSFHx reviewed in chart.     Review of past medical records shows the patient has a notable history of methamphetamine use. He was previously admitted for cellulitis and drug use.     4:09 AM - Patient seen and examined at bedside. Patient will be treated with Versed 2 mg, Haldol 5 mg, Benadryl 25 mg.  Ordered CBC with differential, Basic Metabolic Panel, Creatine Kinase, Urine Drug screen, Diagnostic Alcohol to evaluate his symptoms. The differential diagnoses include but are not limited to: presents today with altered mental status likely due to methamphetamine use.      5:27 AM- Patient's CK levels are elevated which is consistent with Rhabdomyolysis.     5:29 AM Paula hospitalist.     5:49 AM I discussed the patient's case and the above findings with Dr. Diaz (Hospitalist) who agrees to hospitalize the patient and take over the plan of care moving forward.      DISPOSITION:  Patient will be hospitalized by Dr. Diaz in guarded condition.    FINAL IMPRESSION  1. Altered mental status, unspecified altered mental status type    2. Toxic encephalopathy    3. Non-traumatic rhabdomyolysis    4. Methamphetamine abuse (HCC)          Jodi MELISSA (Leah), am scribing for, and in the presence of, Burak Duke M.D..    Electronically signed by: Jodi Vital (Leah), 7/22/2022    IBurak M.D. personally performed the services described in this documentation, as scribed by Jodi Vital in my presence, and it is both accurate and complete.    The note accurately reflects work and decisions made by me.  Burak Duke M.D.  7/22/2022  10:40 AM

## 2022-07-23 PROBLEM — E83.42 HYPOMAGNESEMIA: Status: ACTIVE | Noted: 2022-07-23

## 2022-07-23 LAB
ALBUMIN SERPL BCP-MCNC: 4 G/DL (ref 3.2–4.9)
ALBUMIN/GLOB SERPL: 2.2 G/DL
ALP SERPL-CCNC: 42 U/L (ref 30–99)
ALT SERPL-CCNC: 45 U/L (ref 2–50)
ANION GAP SERPL CALC-SCNC: 10 MMOL/L (ref 7–16)
AST SERPL-CCNC: 51 U/L (ref 12–45)
BILIRUB SERPL-MCNC: 0.9 MG/DL (ref 0.1–1.5)
BUN SERPL-MCNC: 9 MG/DL (ref 8–22)
CALCIUM SERPL-MCNC: 8.7 MG/DL (ref 8.5–10.5)
CHLORIDE SERPL-SCNC: 108 MMOL/L (ref 96–112)
CK SERPL-CCNC: 1986 U/L (ref 0–154)
CO2 SERPL-SCNC: 21 MMOL/L (ref 20–33)
CREAT SERPL-MCNC: 0.6 MG/DL (ref 0.5–1.4)
GFR SERPLBLD CREATININE-BSD FMLA CKD-EPI: 140 ML/MIN/1.73 M 2
GLOBULIN SER CALC-MCNC: 1.8 G/DL (ref 1.9–3.5)
GLUCOSE SERPL-MCNC: 87 MG/DL (ref 65–99)
MAGNESIUM SERPL-MCNC: 1.7 MG/DL (ref 1.5–2.5)
POTASSIUM SERPL-SCNC: 3.7 MMOL/L (ref 3.6–5.5)
PROT SERPL-MCNC: 5.8 G/DL (ref 6–8.2)
SODIUM SERPL-SCNC: 139 MMOL/L (ref 135–145)

## 2022-07-23 PROCEDURE — 700111 HCHG RX REV CODE 636 W/ 250 OVERRIDE (IP): Performed by: HOSPITALIST

## 2022-07-23 PROCEDURE — 700102 HCHG RX REV CODE 250 W/ 637 OVERRIDE(OP): Performed by: STUDENT IN AN ORGANIZED HEALTH CARE EDUCATION/TRAINING PROGRAM

## 2022-07-23 PROCEDURE — 770001 HCHG ROOM/CARE - MED/SURG/GYN PRIV*

## 2022-07-23 PROCEDURE — A9270 NON-COVERED ITEM OR SERVICE: HCPCS | Performed by: STUDENT IN AN ORGANIZED HEALTH CARE EDUCATION/TRAINING PROGRAM

## 2022-07-23 PROCEDURE — 82550 ASSAY OF CK (CPK): CPT

## 2022-07-23 PROCEDURE — 700105 HCHG RX REV CODE 258: Performed by: STUDENT IN AN ORGANIZED HEALTH CARE EDUCATION/TRAINING PROGRAM

## 2022-07-23 PROCEDURE — 83735 ASSAY OF MAGNESIUM: CPT

## 2022-07-23 PROCEDURE — 36415 COLL VENOUS BLD VENIPUNCTURE: CPT

## 2022-07-23 PROCEDURE — 99232 SBSQ HOSP IP/OBS MODERATE 35: CPT | Performed by: HOSPITALIST

## 2022-07-23 PROCEDURE — 80053 COMPREHEN METABOLIC PANEL: CPT

## 2022-07-23 RX ORDER — MAGNESIUM SULFATE HEPTAHYDRATE 40 MG/ML
2 INJECTION, SOLUTION INTRAVENOUS ONCE
Status: COMPLETED | OUTPATIENT
Start: 2022-07-23 | End: 2022-07-23

## 2022-07-23 RX ORDER — MAGNESIUM SULFATE 1 G/100ML
1 INJECTION INTRAVENOUS ONCE
Status: COMPLETED | OUTPATIENT
Start: 2022-07-23 | End: 2022-07-23

## 2022-07-23 RX ADMIN — ACETAMINOPHEN 650 MG: 325 TABLET, FILM COATED ORAL at 09:54

## 2022-07-23 RX ADMIN — MAGNESIUM SULFATE HEPTAHYDRATE 2 G: 40 INJECTION, SOLUTION INTRAVENOUS at 11:38

## 2022-07-23 RX ADMIN — MAGNESIUM SULFATE HEPTAHYDRATE 1 G: 1 INJECTION, SOLUTION INTRAVENOUS at 14:44

## 2022-07-23 RX ADMIN — SODIUM CHLORIDE: 9 INJECTION, SOLUTION INTRAVENOUS at 09:54

## 2022-07-23 RX ADMIN — SODIUM CHLORIDE: 9 INJECTION, SOLUTION INTRAVENOUS at 22:22

## 2022-07-23 ASSESSMENT — PAIN DESCRIPTION - PAIN TYPE
TYPE: ACUTE PAIN
TYPE: ACUTE PAIN

## 2022-07-23 ASSESSMENT — ENCOUNTER SYMPTOMS
VOMITING: 0
RESPIRATORY NEGATIVE: 1
CARDIOVASCULAR NEGATIVE: 1
SORE THROAT: 0
NAUSEA: 0
EYES NEGATIVE: 1
FEVER: 0
ABDOMINAL PAIN: 0
NEUROLOGICAL NEGATIVE: 1
GASTROINTESTINAL NEGATIVE: 1
DIZZINESS: 0
FOCAL WEAKNESS: 0
HEADACHES: 0
CHILLS: 0
SHORTNESS OF BREATH: 0
CONSTITUTIONAL NEGATIVE: 1
COUGH: 0
MUSCULOSKELETAL NEGATIVE: 1
WEIGHT LOSS: 0
BLURRED VISION: 0
BRUISES/BLEEDS EASILY: 0
MYALGIAS: 0
DEPRESSION: 0
WEAKNESS: 0
DIAPHORESIS: 0
PALPITATIONS: 0

## 2022-07-23 ASSESSMENT — LIFESTYLE VARIABLES: SUBSTANCE_ABUSE: 1

## 2022-07-23 ASSESSMENT — FIBROSIS 4 INDEX: FIB4 SCORE: 0.6

## 2022-07-23 NOTE — CARE PLAN
The patient is Stable - Low risk of patient condition declining or worsening    Problem: Knowledge Deficit - Standard  Goal: Patient and family/care givers will demonstrate understanding of plan of care, disease process/condition, diagnostic tests and medications  7/23/2022 0407 by Ann Rodrigez R.N.  Outcome: Progressing  7/23/2022 0404 by Ann Rodrigez R.N.  Outcome: Progressing     Problem: Fluid Volume  Goal: Fluid volume balance will be maintained  Outcome: Progressing     Problem: Pain - Standard  Goal: Alleviation of pain or a reduction in pain to the patient’s comfort goal  7/23/2022 0407 by Ann Rodrigez R.N.  Outcome: Progressing  7/23/2022 0406 by Ann Rodrigez R.N.  Outcome: Progressing     Problem: Fall Risk  Goal: Patient will remain free from falls  Outcome: Progressing     Shift Goals  Clinical Goals: pain control, comfort and safety  Patient Goals: comfort and sleep    Progress made toward(s) clinical / shift goals:  Pt denies pain. POC updated. Pt remained free of fall, no adverse event noted.

## 2022-07-23 NOTE — PROGRESS NOTES
Received ER report and accepted care of patient.  Patient currently resting in bed in no visible or stated distress.  Bed controls on and bed in locked position.  Call light and personal possessions within reach.  Plan of care to include IV fluids, assistance with ADL's, and continued monitoring of admit diagnosis.  Patient verbalizes agreement with plan of care, and has no additional questions or concerns at this time.  Will continue to update notes/plan of care as needed throughout shift.

## 2022-07-23 NOTE — PROGRESS NOTES
[facility] Medicine Daily Progress Note    Date of Service  7/23/2022    Chief Complaint  Thomas James Dancer V is a 21 y.o. male admitted 7/22/2022 with intoxication, altered mental status    Hospital Course  Patient is a 21-year-old homeless male with a past medical history of methamphetamine abuse. He presented to [facility] on 7/22/2022 with altered level of consciousness.  Patient unable to provide history, so this was obtained from chart review, and from discussion with ED staff.  Apparently, patient was at the [facility] and requested staff to call EMS.  Patient was noted to be wandering around the parking lot and was unable to answer questions.    In the emergency department vital signs with fever at 100.6, tachycardia at 115, tachypnea in the 20s and SBP at 150.  Patient saturating well room air.  Chemistry normal.  Chemistry with hypokalemia and CPK in the 3400s.  Diagnostic alcohol low.  Patient was admitted for acute metabolic encephalopathy secondary to likely methamphetamine abuse as well as rhabdomyolysis which requires aggressive IV hydration.    Interval Problem Update  Axox3, he is now alert and appropriate has a vague recollection about the past few days. We reviewed his diagnosis and the plan. CPK improving, he denies pain, he states he is very tired. Will continue hydration if cpk continues to improve plan to d/c in the morning. Drug cessation and strategies discussed and recommended.     I have discussed this patient's plan of care and discharge plan at IDT rounds today with Case Management, Nursing, Nursing leadership, and other members of the IDT team.    Consultants/Specialty    Code Status  Full Code    Disposition  Patient is not medically cleared for discharge.   Anticipate discharge to be determined    Review of Systems  Review of Systems   Constitutional: Negative.  Negative for chills, diaphoresis, fever, malaise/fatigue and weight loss.   HENT: Negative.  Negative for sore throat.    Eyes:  Negative.  Negative for blurred vision.   Respiratory: Negative.  Negative for cough and shortness of breath.    Cardiovascular: Negative.  Negative for chest pain, palpitations and leg swelling.   Gastrointestinal: Negative.  Negative for abdominal pain, nausea and vomiting.   Genitourinary: Negative.  Negative for dysuria.   Musculoskeletal: Negative.  Negative for myalgias.   Skin: Negative.  Negative for itching and rash.   Neurological: Negative.  Negative for dizziness, focal weakness, weakness and headaches.   Endo/Heme/Allergies: Negative.  Does not bruise/bleed easily.   Psychiatric/Behavioral: Positive for substance abuse. Negative for depression and suicidal ideas.   All other systems reviewed and are negative.       Physical Exam  Temp:  [36.1 °C (97 °F)-37.1 °C (98.7 °F)] 37.1 °C (98.7 °F)  Pulse:  [60-73] 65  Resp:  [14-18] 18  BP: (102-136)/(63-77) 114/71  SpO2:  [97 %-98 %] 97 %    Physical Exam  Vitals and nursing note reviewed. Exam conducted with a chaperone present.   Constitutional:       General: He is not in acute distress.     Appearance: Normal appearance. He is not diaphoretic.   HENT:      Head: Normocephalic.      Nose: Nose normal.      Mouth/Throat:      Mouth: Mucous membranes are moist.   Eyes:      Pupils: Pupils are equal, round, and reactive to light.   Cardiovascular:      Rate and Rhythm: Normal rate and regular rhythm.      Pulses: Normal pulses.      Heart sounds: Normal heart sounds.   Pulmonary:      Effort: Pulmonary effort is normal.      Breath sounds: Normal breath sounds.   Abdominal:      General: Abdomen is flat. Bowel sounds are normal.      Palpations: Abdomen is soft.   Musculoskeletal:         General: No swelling or deformity. Normal range of motion.   Skin:     General: Skin is warm and dry.      Capillary Refill: Capillary refill takes less than 2 seconds.   Neurological:      General: No focal deficit present.      Mental Status: He is alert.      Cranial  Nerves: No cranial nerve deficit.   Psychiatric:         Attention and Perception: He does not perceive auditory or visual hallucinations.         Speech: Speech is not tangential.         Behavior: Behavior is cooperative.         Thought Content: Thought content is not paranoid or delusional. Thought content does not include homicidal or suicidal ideation. Thought content does not include homicidal plan.         Cognition and Memory: Cognition is not impaired. Memory is not impaired. He does not exhibit impaired recent memory or impaired remote memory.         Fluids    Intake/Output Summary (Last 24 hours) at 7/23/2022 1046  Last data filed at 7/23/2022 1000  Gross per 24 hour   Intake 720 ml   Output --   Net 720 ml       Laboratory  Recent Labs     07/22/22  0435   WBC 9.9   RBC 5.13   HEMOGLOBIN 16.1   HEMATOCRIT 44.5   MCV 86.7   MCH 31.4   MCHC 36.2*   RDW 39.9   PLATELETCT 267   MPV 9.3     Recent Labs     07/22/22  0435 07/23/22  0356   SODIUM 143 139   POTASSIUM 3.5* 3.7   CHLORIDE 106 108   CO2 21 21   GLUCOSE 79 87   BUN 17 9   CREATININE 0.85 0.60   CALCIUM 9.7 8.7                   Imaging  CT-HEAD W/O   Final Result      No acute intracranial abnormality.              Assessment/Plan  * Rhabdomyolysis- (present on admission)  Assessment & Plan  Secondary to likely methamphetamine abuse  Renal function normal  Continue IV hydration  improving  contiue to tollow    Hypomagnesemia  Assessment & Plan  replacing    Hypokalemia  Assessment & Plan  Mild  Replaced  resolved    Acute metabolic encephalopathy- (present on admission)  Assessment & Plan  Likely secondary to methamphetamine abuse  Complicated by rhabdomyolysis  Continue supportive care  Neuro exam remains intact  No acute abnormalities found on CT  Now resolved      Methamphetamine abuse (HCC)  Assessment & Plan  Cessation discussed and recommended       VTE prophylaxis: enoxaparin ppx    I have performed a physical exam and reviewed and updated  ROS and Plan today (7/23/2022). In review of yesterday's note (7/22/2022), there are no changes except as documented above.

## 2022-07-23 NOTE — CARE PLAN
The patient is Stable - Low risk of patient condition declining or worsening    Shift Goals  Clinical Goals: pain control  Patient Goals: comfort and sleep    Progress made toward(s) clinical / shift goals:      Problem: Knowledge Deficit - Standard  Goal: Patient and family/care givers will demonstrate understanding of plan of care, disease process/condition, diagnostic tests and medications  Outcome: Progressing     Problem: Communication  Goal: The ability to communicate needs accurately and effectively will improve  Outcome: Progressing     Problem: Fluid Volume  Goal: Fluid volume balance will be maintained  Outcome: Progressing     Problem: Fall Risk  Goal: Patient will remain free from falls  Outcome: Progressing       Patient is not progressing towards the following goals:NA

## 2022-07-23 NOTE — CARE PLAN
Problem: Psychosocial  Goal: Patient's ability to verbalize feelings about condition will improve  Outcome: Progressing     Problem: Communication  Goal: The ability to communicate needs accurately and effectively will improve  Outcome: Progressing     Problem: Fluid Volume  Goal: Fluid volume balance will be maintained  Outcome: Progressing   The patient is Watcher - Medium risk of patient condition declining or worsening         Progress made toward(s) clinical / shift goals:  Patient resting comfortably    Patient is not progressing towards the following goals: N/A

## 2022-07-23 NOTE — PROGRESS NOTES
Received report from day shift RN. Pt met lying in bed awake and calm on taking over the shift. A/OX4, breathing unlabored, no acute respiratory distress noted. RFA IV access noted. Pt denies pain at this time, call light within reach and safety precautions maintained.

## 2022-07-23 NOTE — PROGRESS NOTES
4 Eyes Skin Assessment Completed by LANCE Juarez and LANCE Lorenz.    Head WDL  Ears WDL  Nose WDL  Mouth WDL dry  Neck WDL  Breast/Chest WDL  Shoulder Blades WDL  Spine WDL  (R) Arm/Elbow/Hand WDL  (L) Arm/Elbow/Hand WDL  Abdomen WDL  Groin WDL  Scrotum/Coccyx/Buttocks WDL  (R) Leg WDL  (L) Leg WDL  (R) Heel/Foot/Toe WDL  (L) Heel/Foot/Toe WDL          Devices In Places Blood Pressure Cuff and Pulse Ox      Interventions In Place Pillows and Pressure Redistribution Mattress    Possible Skin Injury No    Pictures Uploaded Into Epic N/A  Wound Consult Placed N/A  RN Wound Prevention Protocol Ordered No

## 2022-07-23 NOTE — PROGRESS NOTES
Pt awake and stable. Pt denies pain at this time. Call light within reach and safety precautions maintained. Report given to day shift RN.

## 2022-07-23 NOTE — CARE PLAN
Critical lab result, CPK 1986, received from lab ([facility]). Lab result read back to [facility] and confirmed. CPK result trending downwards. Pt stable, v/signs WNL. MD aware of abnormal lab.

## 2022-07-24 LAB
CK SERPL-CCNC: 1106 U/L (ref 0–154)
MAGNESIUM SERPL-MCNC: 1.8 MG/DL (ref 1.5–2.5)

## 2022-07-24 PROCEDURE — 82550 ASSAY OF CK (CPK): CPT

## 2022-07-24 PROCEDURE — 770001 HCHG ROOM/CARE - MED/SURG/GYN PRIV*

## 2022-07-24 PROCEDURE — 700105 HCHG RX REV CODE 258: Performed by: STUDENT IN AN ORGANIZED HEALTH CARE EDUCATION/TRAINING PROGRAM

## 2022-07-24 PROCEDURE — 700102 HCHG RX REV CODE 250 W/ 637 OVERRIDE(OP): Performed by: STUDENT IN AN ORGANIZED HEALTH CARE EDUCATION/TRAINING PROGRAM

## 2022-07-24 PROCEDURE — A9270 NON-COVERED ITEM OR SERVICE: HCPCS | Performed by: STUDENT IN AN ORGANIZED HEALTH CARE EDUCATION/TRAINING PROGRAM

## 2022-07-24 PROCEDURE — 700111 HCHG RX REV CODE 636 W/ 250 OVERRIDE (IP): Performed by: STUDENT IN AN ORGANIZED HEALTH CARE EDUCATION/TRAINING PROGRAM

## 2022-07-24 PROCEDURE — 83735 ASSAY OF MAGNESIUM: CPT

## 2022-07-24 RX ADMIN — SODIUM CHLORIDE: 9 INJECTION, SOLUTION INTRAVENOUS at 17:26

## 2022-07-24 RX ADMIN — SODIUM CHLORIDE: 9 INJECTION, SOLUTION INTRAVENOUS at 04:58

## 2022-07-24 RX ADMIN — ENOXAPARIN SODIUM 40 MG: 40 INJECTION SUBCUTANEOUS at 17:26

## 2022-07-24 RX ADMIN — SENNOSIDES AND DOCUSATE SODIUM 2 TABLET: 50; 8.6 TABLET ORAL at 05:41

## 2022-07-24 NOTE — CARE PLAN
The patient is Stable - Low risk of patient condition declining or worsening    Shift Goals  Clinical Goals: Compliant  Patient Goals: Comfort, rest    Progress made toward(s) clinical / shift goals: Tolerating IVF well, labs improving.    Patient is not progressing towards the following goals: N/A    Problem: Knowledge Deficit - Standard  Goal: Patient and family/care givers will demonstrate understanding of plan of care, disease process/condition, diagnostic tests and medications  Outcome: Progressing     Problem: Psychosocial  Goal: Patient's level of anxiety will decrease  Outcome: Progressing  Goal: Patient's ability to verbalize feelings about condition will improve  Outcome: Progressing  Goal: Patient's ability to re-evaluate and adapt role responsibilities will improve  Outcome: Progressing  Goal: Patient and family will demonstrate ability to cope with life altering diagnosis and/or procedure  Outcome: Progressing  Goal: Spiritual and cultural needs incorporated into hospitalization  Outcome: Progressing     Problem: Communication  Goal: The ability to communicate needs accurately and effectively will improve  Outcome: Progressing     Problem: Discharge Barriers/Planning  Goal: Patient's continuum of care needs are met  Outcome: Progressing     Problem: Fluid Volume  Goal: Fluid volume balance will be maintained  Outcome: Progressing     Problem: Infection - Standard  Goal: Patient will remain free from infection  Outcome: Progressing     Problem: Pain - Standard  Goal: Alleviation of pain or a reduction in pain to the patient’s comfort goal  Outcome: Progressing     Problem: Fall Risk  Goal: Patient will remain free from falls  Outcome: Progressing

## 2022-07-24 NOTE — CARE PLAN
Shift Goals  Clinical Goals: remain compliant and relaxed  Patient Goals: shower, sleep    Progress made toward(s) clinical / shift goals:      Patient understands plan of care; communicates needs effectively   Problem: Knowledge Deficit - Standard  Goal: Patient and family/care givers will demonstrate understanding of plan of care, disease process/condition, diagnostic tests and medications  Outcome: Progressing     Problem: Communication  Goal: The ability to communicate needs accurately and effectively will improve  Outcome: Progressing

## 2022-07-24 NOTE — PROGRESS NOTES
Pt is A&O 4  VSS  Declines pain  declines nausea  Tolerating regular diet  NS infusing into R PIV see mar   + Voids  +BM  Upself  SCD's off  Bed alarm off, pt no fall risk per josue brown  Reviewed plan of care with patient, bed in lowest position and locked, pt resting comfortably now, call light within reach, all needs met at this time. Interventions will be executed per plan of care

## 2022-07-25 VITALS
TEMPERATURE: 97.9 F | RESPIRATION RATE: 15 BRPM | WEIGHT: 147.27 LBS | SYSTOLIC BLOOD PRESSURE: 115 MMHG | BODY MASS INDEX: 19.97 KG/M2 | DIASTOLIC BLOOD PRESSURE: 71 MMHG | OXYGEN SATURATION: 99 % | HEART RATE: 65 BPM

## 2022-07-25 PROCEDURE — 99239 HOSP IP/OBS DSCHRG MGMT >30: CPT | Performed by: HOSPITALIST

## 2022-07-25 PROCEDURE — A9270 NON-COVERED ITEM OR SERVICE: HCPCS | Performed by: STUDENT IN AN ORGANIZED HEALTH CARE EDUCATION/TRAINING PROGRAM

## 2022-07-25 PROCEDURE — 97165 OT EVAL LOW COMPLEX 30 MIN: CPT

## 2022-07-25 PROCEDURE — 97162 PT EVAL MOD COMPLEX 30 MIN: CPT

## 2022-07-25 PROCEDURE — 700102 HCHG RX REV CODE 250 W/ 637 OVERRIDE(OP): Performed by: STUDENT IN AN ORGANIZED HEALTH CARE EDUCATION/TRAINING PROGRAM

## 2022-07-25 RX ADMIN — ACETAMINOPHEN 650 MG: 325 TABLET, FILM COATED ORAL at 09:54

## 2022-07-25 ASSESSMENT — COGNITIVE AND FUNCTIONAL STATUS - GENERAL
SUGGESTED CMS G CODE MODIFIER MOBILITY: CH
SUGGESTED CMS G CODE MODIFIER DAILY ACTIVITY: CH
DAILY ACTIVITIY SCORE: 24
MOBILITY SCORE: 24

## 2022-07-25 ASSESSMENT — GAIT ASSESSMENTS
DEVIATION: NO DEVIATION
DISTANCE (FEET): 500
GAIT LEVEL OF ASSIST: MODIFIED INDEPENDENT

## 2022-07-25 ASSESSMENT — PAIN DESCRIPTION - PAIN TYPE: TYPE: ACUTE PAIN

## 2022-07-25 NOTE — THERAPY
"Physical Therapy   Initial Evaluation     Patient Name: Thomas James Dancer V  Age:  21 y.o., Sex:  male  Medical Record #: [id number]  Today's Date: 7/25/2022     Precautions  Precautions: Fall Risk    Assessment  Patient is 21 y.o. male admitted with acute metabolic encephalopathy 2/2 methamphetamine use, found to have rhabdomyolysis. Pt with recent admission 6/12 for sepsis w/ cellulitis to LLE 2/2 meth use. Pt appears to be at baseline mobility requiring Charla to ambulate around unit. Pt with odd affect, unsure if 2/2 recent drug use, however, no signs of impaired safety awareness during session. Pt stating he lives with father and father's GF. Pt with no further acute needs, will d/c PT.     Plan    Recommend Physical Therapy for Evaluation only     DC Equipment Recommendations: None  Discharge Recommendations: Anticipate that the patient will have no further physical therapy needs after discharge from the hospital       Subjective    \"Let's take a walk to the [employer] store.\" Pt reports was joking      Objective     07/25/22 1056   Vitals   O2 Delivery Device None - Room Air   Pain 0 - 10 Group   Therapist Pain Assessment Post Activity Pain Same as Prior to Activity;0;Nurse Notified   Prior Living Situation   Housing / Facility 1 Story House   Steps Into Home 2   Steps In Home 0   Bathroom Set up Bathtub / Shower Combination   Equipment Owned None   Lives with - Patient's Self Care Capacity Parents   Comments Pt reports living with Father and Father's GF in [place]. Not currently working, hoping to get back into HVAC   Prior Level of Functional Mobility   Bed Mobility Independent   Transfer Status Independent   Ambulation Independent   Distance Ambulation (Feet)   (community)   Assistive Devices Used None   Stairs Independent   Cognition    Cognition / Consciousness X   Level of Consciousness Alert   Comments Pt pleasant and cooperative. Odd affect, difficulty with word finding at times. Answering simple questions " with unrelated answers. Appeared confused, however A&Ox4 upon questioning. Unsure if 2/2 recent drug use   Active ROM Lower Body    Active ROM Lower Body  WDL   Strength Lower Body   Lower Body Strength  WDL   Strength Upper Body   Upper Body Strength  WDL   Coordination Upper Body   Coordination WDL   Coordination Lower Body    Coordination Lower Body  WDL   Balance Assessment   Sitting Balance (Static) Good   Sitting Balance (Dynamic) Good   Standing Balance (Static) Good   Standing Balance (Dynamic) Fair +   Weight Shift Sitting Good   Weight Shift Standing Good   Comments no AD   Gait Analysis   Gait Level Of Assist Modified Independent   Assistive Device None   Distance (Feet) 500   # of Times Distance was Traveled 1   Deviation No deviation   # of Stairs Climbed   (simulated steps with alternated SLS and no UE support. No LOB, pt deferred formal stair negotiation 2/2 no concerns. Pt functionally capable of negotiating 2 FRAN home independently)   Level of Assist with Stairs Modified Independent   Weight Bearing Status no restriction   Bed Mobility    Supine to Sit Modified Independent   Sit to Supine   (up in chair post)   Scooting Modified Independent   Rolling Modified Independent   Functional Mobility   Sit to Stand Modified Independent   Bed, Chair, Wheelchair Transfer Modified Independent   Transfer Method Stand Step   Mobility no AD, ambulation around unit   How much difficulty does the patient currently have...   Turning over in bed (including adjusting bedclothes, sheets and blankets)? 4   Sitting down on and standing up from a chair with arms (e.g., wheelchair, bedside commode, etc.) 4   Moving from lying on back to sitting on the side of the bed? 4   How much help from another person does the patient currently need...   Moving to and from a bed to a chair (including a wheelchair)? 4   Need to walk in a hospital room? 4   Climbing 3-5 steps with a railing? 4   6 clicks Mobility Score 24   Activity  Tolerance   Comments no overt s/s fatigue   Education Group   Education Provided Role of Physical Therapist   Role of Physical Therapist Patient Response Patient;Acceptance;Explanation;Action Demonstration;Verbal Demonstration   Anticipated Discharge Equipment and Recommendations   DC Equipment Recommendations None   Discharge Recommendations Anticipate that the patient will have no further physical therapy needs after discharge from the hospital   Interdisciplinary Plan of Care Collaboration   IDT Collaboration with  Nursing;Occupational Therapist   Patient Position at End of Therapy Seated;Call Light within Reach;Tray Table within Reach;Phone within Reach   Collaboration Comments RN updated   Session Information   Date / Session Number  7/25: Sarah only. D/c PT

## 2022-07-25 NOTE — DISCHARGE SUMMARY
"Discharge Summary    CHIEF COMPLAINT ON ADMISSION  Chief Complaint   Patient presents with   • Drug Ingestion   • ALOC       Reason for Admission  Altered Mental Status     Admission Date  7/22/2022    CODE STATUS  Full Code    HPI & HOSPITAL COURSE  \"Patient is a 21-year-old homeless male with a past medical history of methamphetamine abuse. He presented to [facility] on 7/22/2022 with altered level of consciousness.  Patient unable to provide history, so this was obtained from chart review, and from discussion with ED staff.  Apparently, patient was at the [facility] and requested staff to call EMS.  Patient was noted to be wandering around the parking lot and was unable to answer questions.    In the emergency department vital signs with fever at 100.6, tachycardia at 115, tachypnea in the 20s and SBP at 150.  Patient saturating well room air.  Chemistry normal.  Chemistry with hypokalemia and CPK in the 3400s.  Diagnostic alcohol low.  Patient was admitted for acute metabolic encephalopathy secondary to likely methamphetamine abuse as well as rhabdomyolysis which requires aggressive IV hydration.\"    Patient had CK monitored and is now downtrending last CK about 1100.  Started IV fluids.  Seen by PT and OT and cleared from their perspective.  He will be discharged home, fair condition encouraged to stop methamphetamine use.    Therefore, he is discharged in fair and stable condition to home with close outpatient follow-up.    The patient met 2-midnight criteria for an inpatient stay at the time of discharge.    Discharge Date  7/25/2022    FOLLOW UP ITEMS POST DISCHARGE  Follow-up PCP    DISCHARGE DIAGNOSES  Principal Problem:    Rhabdomyolysis POA: Yes  Active Problems:    Methamphetamine abuse (HCC) POA: Unknown    Acute metabolic encephalopathy POA: Yes    Hypokalemia POA: Unknown    Hypomagnesemia POA: Unknown  Resolved Problems:    * No resolved hospital problems. *      FOLLOW UP  No future appointments.  No " follow-up provider specified.    MEDICATIONS ON DISCHARGE     Medication List      You have not been prescribed any medications.         Allergies  Allergies   Allergen Reactions   • Bee Anaphylaxis   • Bee Swelling     Pt reports that he swells up where he gets stung        DIET  Orders Placed This Encounter   Procedures   • Diet Order Diet: Regular     Standing Status:   Standing     Number of Occurrences:   1     Order Specific Question:   Diet:     Answer:   Regular [1]       ACTIVITY  As tolerated.  Weight bearing as tolerated      LABORATORY  Lab Results   Component Value Date    SODIUM 139 07/23/2022    POTASSIUM 3.7 07/23/2022    CHLORIDE 108 07/23/2022    CO2 21 07/23/2022    GLUCOSE 87 07/23/2022    BUN 9 07/23/2022    CREATININE 0.60 07/23/2022        Lab Results   Component Value Date    WBC 9.9 07/22/2022    HEMOGLOBIN 16.1 07/22/2022    HEMATOCRIT 44.5 07/22/2022    PLATELETCT 267 07/22/2022        Total time of the discharge process exceeds 31 minutes.

## 2022-07-25 NOTE — PROGRESS NOTES
Report received from day shift RN, assumed Care.   Patient is AOx4, responds appropriately.      Pain controlled at this time.  Patient is tolerating regular diet, denies nausea/vomiting. + flatus  Up self with steady gait.    Plan of care discussed, all questions answered.    Educated on use of call light and importance of calling when assistance is required. Pt verbalizes understanding.    Call light and belongings within reach, treaded slipper socks on, bed in lowest locked position.  All needs met at this time.

## 2022-07-25 NOTE — THERAPY
"Occupational Therapy   Initial Evaluation     Patient Name: Thomas James Dancer V  Age:  21 y.o., Sex:  male  Medical Record #: [id number]  Today's Date: 7/25/2022     Precautions  Precautions: Fall Risk    Assessment    Patient is 21 y.o. male admitted with acute metabolic encephalopathy d/t methamphetamine use, found to have rhabdomyolysis. Pt with recent admission 6/12 for sepsis w/ cellulitis to E. Pt was cooperative but appeared to be confused. Pt was hesitant to say why he was in the hospital answering \"muscle problems\". Unsure if d/t confusion or didn't want to admit drug use.  He was able to mobilize well w/out assistance/ AD and complete standing ADL tasks. Reports living in a 1 story house w/ his father. No further acute OT needs at this time. Recommend supportive discharge w/ 24/7 supervision d/t his cognition and drug use.     Plan    Recommend Occupational Therapy for Evaluation only.    DC Equipment Recommendations:  (Pt does not require any DME)  Discharge Recommendations: Other - (No further OT needs but reccomend d/c w/ 24/7 supervision d/t cog and drug use)     Subjective    \" I am not going to the bathroom bc I am lazy not because I can't\"      Objective       07/25/22 0854   Initial Contact Note    Initial Contact Note Order Received and Verified, Evaluation Only - Patient Does Not Require Further Acute Occupational Therapy at this Time.  However, May Benefit from Post Acute Therapy for Higher Level Functional Deficits.   Prior Living Situation   Prior Services Home-Independent   Housing / Facility 1 Story House   Bathroom Set up Bathtub / Shower Combination   Equipment Owned None   Lives with - Patient's Self Care Capacity Parents   Comments Pt reports living in a 1 story house w/ dad and his fiance   Prior Level of ADL Function   Self Feeding Independent   Grooming / Hygiene Independent   Bathing Independent   Dressing Independent   Precautions   Precautions Fall Risk   Vitals   O2 Delivery " "Device None - Room Air   Pain   Intervention Declines   Pain 0 - 10 Group   Pain Rating Scale (NPRS) 0   Non Verbal Descriptors   Non Verbal Scale  Calm   Cognition    Cognition / Consciousness X   Speech/ Communication Word Finding Impairment   Orientation Level Not Oriented to Reason  (Noted he was here for \" Muscle problem\")   Level of Consciousness Alert   New Learning Impaired   Attention Impaired   Comments Pt pleasant and cooperative but seemed confused. Had difficulty with word finding.   Active ROM Upper Body   Active ROM Upper Body  WDL   Neurological Concerns   Neurological Concerns No   Balance Assessment   Sitting Balance (Static) Good   Sitting Balance (Dynamic) Good   Standing Balance (Static) Good   Standing Balance (Dynamic) Fair +   Weight Shift Sitting Good   Weight Shift Standing Good   Comments no AD   Bed Mobility    Supine to Sit Supervised   Sit to Supine   (Left up in chair)   Scooting Supervised   Rolling Supervised   ADL Assessment   Eating Independent   Grooming Seated;Supervision   Lower Body Dressing Supervision   Toileting   (Has been using urinal d/t \"laziness\")   How much help from another person does the patient currently need...   Putting on and taking off regular lower body clothing? 4   Bathing (including washing, rinsing, and drying)? 4   Toileting, which includes using a toilet, bedpan, or urinal? 4   Putting on and taking off regular upper body clothing? 4   Taking care of personal grooming such as brushing teeth? 4   Eating meals? 4   6 Clicks Daily Activity Score 24   Functional Mobility   Sit to Stand Supervised   Bed, Chair, Wheelchair Transfer Supervised   Transfer Method Stand Step   Comments No AD   Activity Tolerance   Sitting in Chair   (Left up in chair)   Sitting Edge of Bed 4 min   Standing 10 min   Education Group   Education Provided Role of Occupational Therapist;Activities of Daily Living;Home Safety;Pathology of bedrest   Role of Occupational Therapist Patient " Response Patient;Acceptance;Explanation;Verbal Demonstration   Home Safety Patient Response Patient;Acceptance;Explanation;Verbal Demonstration   ADL Patient Response Patient;Acceptance;Explanation;Verbal Demonstration   Pathology of Bedrest Patient Response Patient;Acceptance;Explanation;Verbal Demonstration   Anticipated Discharge Equipment and Recommendations   DC Equipment Recommendations   (Pt does not require any DME)   Discharge Recommendations Other -  (No further OT needs but reccomend d/c w/ 24/7 supervision d/t cog and drug use)   Interdisciplinary Plan of Care Collaboration   IDT Collaboration with  Nursing;Physical Therapist   Patient Position at End of Therapy Seated;Call Light within Reach;Tray Table within Reach;Phone within Reach   Collaboration Comments Rn updated   Session Information   Date / Session Number  7/25, 1 ( eval only)   Priority 0

## 2022-07-25 NOTE — PROGRESS NOTES
Discharge instructions reviewed and questions answered. PIV removed and catheter intact. Pt ambulated to taxi and discharged.

## 2022-07-25 NOTE — CARE PLAN
The patient is stable    Shift Goals  Clinical Goals: rest  Patient Goals: rest    Progress made toward(s) clinical / shift goals:      Problem: Knowledge Deficit - Standard  Goal: Patient and family/care givers will demonstrate understanding of plan of care, disease process/condition, diagnostic tests and medications  Outcome: Progressing

## 2022-07-25 NOTE — DISCHARGE PLANNING
Anticipated Discharge Disposition: home    Action: pt needs taxi ride to friend’s house, [address], [place] Cab slip [phone] Given to KERA Rodriguez.     Barriers to Discharge: needs transportation home    Plan: given cab slip.

## 2022-07-25 NOTE — DOCUMENTATION QUERY
[facility]                                                                       Query Response Note      PATIENT:               DANCER, THOMAS  ACCT #:                  [id number]  MRN:                     [id number]  :                      2000  ADMIT DATE:       2022 3:52 AM  DISCH DATE:          RESPONDING  PROVIDER #:        [id number]           QUERY TEXT:    Pt has documented metabolic encephalopathy documented as ?likely? secondary to methamphetamine use.    Please clarify status of this condition:    NOTE:  If an appropriate response is not listed below, please respond with a new note.    The patient's Clinical Indicators include:     ED:  Altered patient secondary to drug intoxication; toxic encephalopathy  H&P: Acute metabolic encephalopathy likely 2/2 methamphetamine abuse  UDS: Positive for amphetamines; Potassium: 3.5; Magnesium: 1.7: CPK: 3444  CT-Head: No acute intracranial abnormality.      PN: Axox3, he is now alert and appropriate     Treatment: Aggressive IV hydration; replete potassium/magnesium; lab/imaging  Risk Factors: Meth abuse; rhabdomyolysis; hypokalemia; hypomagnesemia    Thank You,  Elma Davila RN  Clinical    Connect via [employer]  Options provided:   -- Metabolic encephalopathy is due to methamphetamine use (toxic encephalopathy) ruled in   -- Metabolic encephalopathy is not due to methamphetamine use   -- Other explanation, (please specify other explanation)   -- Unable to determine      Query created by: Elma Davila on 2022 9:17 AM    RESPONSE TEXT:    Metabolic encephalopathy is due to methamphetamine use (toxic encephalopathy) ruled in          Electronically signed by:  MIKE CARREON MD 2022 10:42 AM

## 2022-07-25 NOTE — DISCHARGE INSTRUCTIONS
Discharge Instructions    Discharged to home by car with relative. Discharged via wheelchair, hospital escort: Yes.  Special equipment needed: Not Applicable    Be sure to schedule a follow-up appointment with your primary care doctor or any specialists as instructed.     Discharge Plan:        I understand that a diet low in cholesterol, fat, and sodium is recommended for good health. Unless I have been given specific instructions below for another diet, I accept this instruction as my diet prescription.   Other diet: Regular diet    Special Instructions: None    -Is this patient being discharged with medication to prevent blood clots?  No    Is patient discharged on Warfarin / Coumadin?   No

## 2022-07-28 PROCEDURE — 99284 EMERGENCY DEPT VISIT MOD MDM: CPT

## 2022-07-28 ASSESSMENT — FIBROSIS 4 INDEX: FIB4 SCORE: 0.6

## 2022-07-29 ENCOUNTER — HOSPITAL ENCOUNTER (EMERGENCY)
Facility: MEDICAL CENTER | Age: 22
End: 2022-07-29
Attending: EMERGENCY MEDICINE
Payer: COMMERCIAL

## 2022-07-29 VITALS
RESPIRATION RATE: 18 BRPM | HEIGHT: 72 IN | DIASTOLIC BLOOD PRESSURE: 73 MMHG | WEIGHT: 150.79 LBS | HEART RATE: 84 BPM | OXYGEN SATURATION: 99 % | SYSTOLIC BLOOD PRESSURE: 108 MMHG | BODY MASS INDEX: 20.42 KG/M2 | TEMPERATURE: 98.5 F

## 2022-07-29 DIAGNOSIS — F15.10 AMPHETAMINE ABUSE (HCC): ICD-10-CM

## 2022-07-29 LAB
ANION GAP SERPL CALC-SCNC: 17 MMOL/L (ref 7–16)
BASOPHILS # BLD AUTO: 0.3 % (ref 0–1.8)
BASOPHILS # BLD: 0.03 K/UL (ref 0–0.12)
BUN SERPL-MCNC: 28 MG/DL (ref 8–22)
CALCIUM SERPL-MCNC: 9.9 MG/DL (ref 8.5–10.5)
CHLORIDE SERPL-SCNC: 95 MMOL/L (ref 96–112)
CK SERPL-CCNC: 374 U/L (ref 0–154)
CO2 SERPL-SCNC: 23 MMOL/L (ref 20–33)
CREAT SERPL-MCNC: 1.5 MG/DL (ref 0.5–1.4)
EKG IMPRESSION: NORMAL
EOSINOPHIL # BLD AUTO: 0.15 K/UL (ref 0–0.51)
EOSINOPHIL NFR BLD: 1.4 % (ref 0–6.9)
ERYTHROCYTE [DISTWIDTH] IN BLOOD BY AUTOMATED COUNT: 39.6 FL (ref 35.9–50)
GFR SERPLBLD CREATININE-BSD FMLA CKD-EPI: 67 ML/MIN/1.73 M 2
GLUCOSE SERPL-MCNC: 83 MG/DL (ref 65–99)
HCT VFR BLD AUTO: 50.9 % (ref 42–52)
HGB BLD-MCNC: 18.4 G/DL (ref 14–18)
IMM GRANULOCYTES # BLD AUTO: 0.05 K/UL (ref 0–0.11)
IMM GRANULOCYTES NFR BLD AUTO: 0.5 % (ref 0–0.9)
LYMPHOCYTES # BLD AUTO: 2.95 K/UL (ref 1–4.8)
LYMPHOCYTES NFR BLD: 27.8 % (ref 22–41)
MCH RBC QN AUTO: 31.3 PG (ref 27–33)
MCHC RBC AUTO-ENTMCNC: 36.1 G/DL (ref 33.7–35.3)
MCV RBC AUTO: 86.7 FL (ref 81.4–97.8)
MONOCYTES # BLD AUTO: 0.94 K/UL (ref 0–0.85)
MONOCYTES NFR BLD AUTO: 8.8 % (ref 0–13.4)
NEUTROPHILS # BLD AUTO: 6.51 K/UL (ref 1.82–7.42)
NEUTROPHILS NFR BLD: 61.2 % (ref 44–72)
NRBC # BLD AUTO: 0 K/UL
NRBC BLD-RTO: 0 /100 WBC
PLATELET # BLD AUTO: 261 K/UL (ref 164–446)
PMV BLD AUTO: 9.3 FL (ref 9–12.9)
POTASSIUM SERPL-SCNC: 3.9 MMOL/L (ref 3.6–5.5)
RBC # BLD AUTO: 5.87 M/UL (ref 4.7–6.1)
SODIUM SERPL-SCNC: 135 MMOL/L (ref 135–145)
WBC # BLD AUTO: 10.6 K/UL (ref 4.8–10.8)

## 2022-07-29 PROCEDURE — 85025 COMPLETE CBC W/AUTO DIFF WBC: CPT

## 2022-07-29 PROCEDURE — 82550 ASSAY OF CK (CPK): CPT

## 2022-07-29 PROCEDURE — 36415 COLL VENOUS BLD VENIPUNCTURE: CPT

## 2022-07-29 PROCEDURE — 93005 ELECTROCARDIOGRAM TRACING: CPT | Performed by: EMERGENCY MEDICINE

## 2022-07-29 PROCEDURE — 80048 BASIC METABOLIC PNL TOTAL CA: CPT

## 2022-07-29 NOTE — ED NOTES
Pt is discharged. VSS. Paperwork explained and all question answered. All belongings sent with Pt upon departure. Pt ambulatory with a steady gait out of ED.

## 2022-07-29 NOTE — ED TRIAGE NOTES
"Chief Complaint   Patient presents with   • Fatigue     Pt here for fatigue. Pt says he believes he has rhabdomyolysis saying \"I don't know, that's what they said last time I was here. Probably because of my drug use\". Pt uses meth.      Pt was discharged a week ago for rhabdo. Pt ambulatory.    /88   Pulse (!) 103   Temp 36.8 °C (98.2 °F) (Temporal)   Resp 18   Ht 1.829 m (6')   Wt 68.4 kg (150 lb 12.7 oz)   SpO2 96%   BMI 20.45 kg/m²     "

## 2022-07-29 NOTE — ED PROVIDER NOTES
"ER Provider Note     Scribed for Jim Mosley M.D. by Milton Marinelli. 7/29/2022, 12:44 AM.    Primary Care Provider: Pcp Pt States None  Means of Arrival: Walk-in   History obtained from: Patient  History limited by: None     CHIEF COMPLAINT  Chief Complaint   Patient presents with    Fatigue     Pt here for fatigue. Pt says he believes he has rhabdomyolysis saying \"I don't know, that's what they said last time I was here. Probably because of my drug use\". Pt uses meth.        HPI  Thomas James Dancer V is a 21 y.o. male who presents to the Emergency Department for evaluation of fatigue onset prior to arribal. Patient reports not sleeping well the past few nights due to alcohol and drug use. He is experiencing associated body aches. No alleviating factors attempted. Patient admits to using methamphetamine and alcohol.     REVIEW OF SYSTEMS  See HPI for further details. All other systems are negative.     PAST MEDICAL HISTORY   None noted     SURGICAL HISTORY  patient denies any surgical history    SOCIAL HISTORY  Social History     Tobacco Use    Smoking status: Current Every Day Smoker    Smokeless tobacco: Never Used   Vaping Use    Vaping Use: Never used   Substance Use Topics    Alcohol use: Yes    Drug use: Yes     Comment: meth      Social History     Substance and Sexual Activity   Drug Use Yes    Comment: meth       FAMILY HISTORY  History reviewed. No pertinent family history.    CURRENT MEDICATIONS  Home Medications    **Home medications have not yet been reviewed for this encounter**         ALLERGIES  Allergies   Allergen Reactions    Bee Anaphylaxis    Bee Swelling     Pt reports that he swells up where he gets stung        PHYSICAL EXAM  VITAL SIGNS: /88   Pulse (!) 103   Temp 36.8 °C (98.2 °F) (Temporal)   Resp 18   Ht 1.829 m (6')   Wt 68.4 kg (150 lb 12.7 oz)   SpO2 96%   BMI 20.45 kg/m²      Constitutional: Alert in no apparent distress.  HENT: No signs of trauma, Bilateral " external ears normal, Nose normal.   Eyes: Pupils are equal and reactive, Conjunctiva normal, Non-icteric.   Neck: Normal range of motion, No tenderness, Supple, No stridor.   Lymphatic: No lymphadenopathy noted.   Cardiovascular: Regular rate and rhythm, no palpable thrill  Thorax & Lungs: No respiratory distress,  No chest tenderness.  CTAB  Abdomen: Bowel sounds normal, Soft, No tenderness, No masses, No pulsatile masses. No peritoneal signs.  Skin: Warm, Dry, No erythema, No rash.   Back: No bony tenderness, No CVA tenderness.   Extremities: Intact distal pulses, No edema, No tenderness, No cyanosis.  Musculoskeletal: Good range of motion in all major joints. No tenderness to palpation or major deformities noted, muscle aches.  Neurologic: Alert , Normal motor function, Normal sensory function, No focal deficits noted.   Psychiatric: Affect normal, Judgment normal, Mood normal.     DIAGNOSTIC STUDIES / PROCEDURES    EKG Interpretation:  Interpreted by me    Sinus 74  Benign early repolarization pattern seen on prior EKG's  Normal intervals no T wave inversion non ischemic EKG    LABS    Results for orders placed or performed during the hospital encounter of 07/29/22   CBC WITH DIFFERENTIAL   Result Value Ref Range    WBC 10.6 4.8 - 10.8 K/uL    RBC 5.87 4.70 - 6.10 M/uL    Hemoglobin 18.4 (H) 14.0 - 18.0 g/dL    Hematocrit 50.9 42.0 - 52.0 %    MCV 86.7 81.4 - 97.8 fL    MCH 31.3 27.0 - 33.0 pg    MCHC 36.1 (H) 33.7 - 35.3 g/dL    RDW 39.6 35.9 - 50.0 fL    Platelet Count 261 164 - 446 K/uL    MPV 9.3 9.0 - 12.9 fL    Neutrophils-Polys 61.20 44.00 - 72.00 %    Lymphocytes 27.80 22.00 - 41.00 %    Monocytes 8.80 0.00 - 13.40 %    Eosinophils 1.40 0.00 - 6.90 %    Basophils 0.30 0.00 - 1.80 %    Immature Granulocytes 0.50 0.00 - 0.90 %    Nucleated RBC 0.00 /100 WBC    Neutrophils (Absolute) 6.51 1.82 - 7.42 K/uL    Lymphs (Absolute) 2.95 1.00 - 4.80 K/uL    Monos (Absolute) 0.94 (H) 0.00 - 0.85 K/uL    Eos  (Absolute) 0.15 0.00 - 0.51 K/uL    Baso (Absolute) 0.03 0.00 - 0.12 K/uL    Immature Granulocytes (abs) 0.05 0.00 - 0.11 K/uL    NRBC (Absolute) 0.00 K/uL   BASIC METABOLIC PANEL   Result Value Ref Range    Sodium 135 135 - 145 mmol/L    Potassium 3.9 3.6 - 5.5 mmol/L    Chloride 95 (L) 96 - 112 mmol/L    Co2 23 20 - 33 mmol/L    Glucose 83 65 - 99 mg/dL    Bun 28 (H) 8 - 22 mg/dL    Creatinine 1.50 (H) 0.50 - 1.40 mg/dL    Calcium 9.9 8.5 - 10.5 mg/dL    Anion Gap 17.0 (H) 7.0 - 16.0   CREATINE KINASE   Result Value Ref Range    CPK Total 374 (H) 0 - 154 U/L   ESTIMATED GFR   Result Value Ref Range    GFR (CKD-EPI) 67 >60 mL/min/1.73 m 2   EKG   Result Value Ref Range    Report       [facility] Emergency Dept.    Test Date:  2022  Pt Name:    THOMAS DANCER                Department: ER  MRN:        [id number]                      Room:       ED [facility]  Gender:     Male                         Technician: [id number]  :        2000                   Requested By:DIPIKA GRAY  Order #:    [id number]                    Reading MD:    Measurements  Intervals                                Axis  Rate:       74                           P:          67  IL:         152                          QRS:        87  QRSD:       96                           T:          33  QT:         396  QTc:        440    Interpretive Statements  SINUS RHYTHM  ST ELEV, PROBABLE NORMAL EARLY REPOL PATTERN  Compared to ECG 06/10/2022 18:03:12  Sinus tachycardia no longer present  Right-axis deviation no longer present  ST (T wave) deviation still present        All labs reviewed by me.      COURSE & MEDICAL DECISION MAKING  Pertinent Labs & Imaging studies reviewed. (See chart for details)    This is a 21 y.o. male that presents with feeling generally achy as well as weak.  The patient has been using methamphetamines constantly.  We will evaluate for rhabdomyolysis as well as electrolyte derangements and EKG  changes.  Will reassess after this..     12:44 AM - Patient seen and examined at bedside. I informed the patient of my plan of care including lab work. Patient verbalizes understanding and agreement to this plan of care. Ordered EKG, BMP, CBC w/ diff., and Creatine Kinase to evaluate.        The patient will return for new or worsening symptoms and is stable at the time of discharge.    EKG is normal.  The patient CPK is slightly elevated but not dangerous.  The patient does have a slightly elevated creatinine of 1.5.  We will recommend oral rehydration.  Hemoglobin is also hemoconcentrated.  We will push fluids.  We will discharge him home after eating and drinking.    DISPOSITION:  Patient will be discharged home in stable condition.    FOLLOW UP:  [facility]  [address] [facility] [address]  [phone]          FINAL IMPRESSION  1. Amphetamine abuse (HCC)          Milton MELISSA (Scribe), am scribing for, and in the presence of, Jim Mosley M.D..    Electronically signed by: Milton Marinelli (Scribe), 7/29/2022    Jim MELISSA M.D. personally performed the services described in this documentation, as scribed by Milton Marinelli in my presence, and it is both accurate and complete.     The note accurately reflects work and decisions made by me.  Jim Mosley M.D.  7/29/2022  3:53 AM

## 2022-08-02 ENCOUNTER — HOSPITAL ENCOUNTER (EMERGENCY)
Facility: MEDICAL CENTER | Age: 22
End: 2022-08-03
Attending: EMERGENCY MEDICINE
Payer: COMMERCIAL

## 2022-08-02 DIAGNOSIS — F23 ACUTE PSYCHOSIS (HCC): ICD-10-CM

## 2022-08-02 DIAGNOSIS — F15.929 METHAMPHETAMINE INTOXICATION (HCC): ICD-10-CM

## 2022-08-02 PROCEDURE — 99284 EMERGENCY DEPT VISIT MOD MDM: CPT

## 2022-08-02 ASSESSMENT — FIBROSIS 4 INDEX: FIB4 SCORE: 0.61

## 2022-08-02 NOTE — ED NOTES
The pt is alert and oriented. The pt avoids eye contact. The pt denies pain. The pt is calm at this time. Vitals stable.

## 2022-08-02 NOTE — ED TRIAGE NOTES
Pt BIBA from street to [address]  Chief Complaint   Patient presents with   • Behavioral Problem     Medics report that the pt has had abnormal behavior all day after smoking meth. 2nd EMS call for this patient today   • Drug Ingestion     Medics report that the pt smoked meth earlier in the AM

## 2022-08-02 NOTE — ED PROVIDER NOTES
ED Provider Note    Scribed for Jim Hannah M.D. by Jodi Vital. 8/2/2022  4:02 PM    Primary care provider: Pcp Pt States None  Means of arrival: EMS  History obtained from: Patient  History limited by: None    CHIEF COMPLAINT  Chief Complaint   Patient presents with   • Behavioral Problem     Medics report that the pt has had abnormal behavior all day after smoking meth. 2nd EMS call for this patient today   • Drug Ingestion     Medics report that the pt smoked meth earlier in the AM       HPI  Thomas James Dancer V is a 21 y.o. male who presents to the Emergency Department via EMS to bedside following reports of methamphetamine usage and subsequent behavioral problems. Patient's last usage was sometime prior to arrival, but he does not remember exactly when. EMS reports the patient was acting abnormally, walking into the street and talking to himself, which prompted bystanders to call EMS. He was not walking into the street in attempts to hurt himself, per EMS. The patient is somewhat somnolent and tangential but denies any attempt at overdose. He further denies any injuries, traumas, physical altercations, head trauma, anxiety, or SI, HI, auditory or visual hallucinations. He denies taking any other drugs or drinking alcohol today. He has no diagnosed history of depression, anxiety, schizophrenia, and takes no medications daily. He denies any sick contact, or symptoms of fever, headache, cough, vomiting, diarrhea, or abdominal pain.     REVIEW OF SYSTEMS  Pertinent positives include: drug use and behavioral abnormalities.  Pertinent negatives include: injuries, traumas, physical altercations, head trauma, anxiety, or SI, HI, auditory or visual hallucinations, fever, headache, cough, vomiting, diarrhea, or abdominal pain.  10+ systems reviewed and negative.      PAST MEDICAL HISTORY  Methamphetamine abuse  Denies schizophrenia or bipolar depression    SOCIAL HISTORY  Social History     Tobacco Use   •  Smoking status: Current Every Day Smoker   • Smokeless tobacco: Never Used   Vaping Use   • Vaping Use: Never used   Substance Use Topics   • Alcohol use: Yes   • Drug use: Yes     Comment: meth     Social History     Substance and Sexual Activity   Drug Use Yes    Comment: meth     CURRENT MEDICATIONS  Home Medications     Reviewed by Rsoendo Bashir R.N. (Registered Nurse) on 08/02/22 at 1518  Med List Status: Not Addressed   Medication Last Dose Status        Patient Reji Taking any Medications                     ALLERGIES  Allergies   Allergen Reactions   • Bee Anaphylaxis   • Bee Swelling     Pt reports that he swells up where he gets stung      PHYSICAL EXAM  VITAL SIGNS: /87   Pulse 90   Temp 37 °C (98.6 °F) (Temporal)   Resp 16   Wt 68 kg (150 lb)   SpO2 97%   BMI 20.34 kg/m²   Reviewed and afebrile  Constitutional: Well developed, Well nourished,somnolent appearing.  HENT: Normocephalic, atraumatic, bilateral external ears normal, wearing a mask.   Eyes: PERRLA, conjunctiva pink, no scleral icterus.   Cardiovascular: Regular rate and rhythm. No murmurs, rubs or gallops.  No dependent edema or calf tenderness  Respiratory: Lungs clear to auscultation bilaterally. No wheezes, rales, or rhonchi.  Abdominal:  Abdomen soft, non-tender, non distended. No rebound, or guarding.    Skin: cutting scars on arms. No erythema, no rash.   Genitourinary: No costovertebral angle tenderness.   Musculoskeletal: no deformities.   Neurologic: Alert & oriented x 3, cranial nerves 2-12 intact by passive exam.  No focal deficit noted.  Psychiatric: slow to answer questions. Mumbling. Difficult to understand. Disorganized. Some non appropriate responses and some appropriate. no obvious delusions. Not responding properly to internal stimuli.     DIFFERENTIAL DIAGNOSIS:  Methamphetamine use  Intoxication  Psychosis  Schizophrenia    INTERVENTIONS:  Patient was fed and tolerated p.o. food and fluids. .    ED  COURSE:  Nursing notes, VS, PMSFHx reviewed in chart.     4:02 PM - Patient seen and examined at bedside. Following evaluation, patient was offered medications to combat agitation or anxiety, which the patient denied. Informed patient that he would be observed in the ED and discharged home when sober, if he continues to act appropriately and has no acute symptoms. Patient verbalizes understanding and agreement to this plan of care.     6:12 PM- Patient reassessed at this time. He still mumbles and is disorganized and unsafe to go home. Will admits into ED obs status until safe to go home.     PATIENT ADMITTED INTO ED OBSERVATION STATUS AT 6:15 PM.    Family History: Denies family history of schizophrenia    7:44 PM- Reassessed patient at this time. He lives with father. He still gets distracted and is not fully baseline, but has made improvement. He wants a sandwich and his father to be called for .     11:49 PM  Patient reassessed and ready to go.  His thought process is now more logical and linear.  Nursing contacted both parents who are out of town.  He has no place to stay tonight.  He was offered the shelter in the [facility] but he declined.  He wishes to leave and is competent to make that decision.    11:50 PM  Discharge from ED opts    MEDICAL DECISION MAKING:  This patient presents with psychosis and disorganization after methamphetamine use.  He is not homicidal or suicidal.  He was observed in the emergency department until he was competent and able to care for himself.  It is doubtful the patient has schizophrenia or schizoaffective disorder.    PLAN:  Discontinue all methamphetamine use  Methamphetamine abuse handout given    [place] [address] [place] [address]  [phone]  Schedule an appointment as soon as possible for a visit   for a new primary      CONDITION: Stable, improved.     FINAL IMPRESSION  1. Methamphetamine intoxication (HCC)    2. Acute psychosis (HCC)    ED  observation care     Jodi MELISSA (Leah), am scribing for, and in the presence of, Jim Hannah M.D..    Electronically signed by: Jodi Vital (Leah), 8/2/2022    Jim MELISSA M.D. personally performed the services described in this documentation, as scribed by Jodi Vital in my presence, and it is both accurate and complete.    The note accurately reflects work and decisions made by me.  Jim Hannah M.D.  8/2/2022  11:52 PM

## 2022-08-03 VITALS
BODY MASS INDEX: 20.34 KG/M2 | OXYGEN SATURATION: 98 % | DIASTOLIC BLOOD PRESSURE: 60 MMHG | SYSTOLIC BLOOD PRESSURE: 130 MMHG | HEART RATE: 70 BPM | TEMPERATURE: 98.8 F | RESPIRATION RATE: 16 BRPM | WEIGHT: 150 LBS

## 2022-08-03 NOTE — ED NOTES
Med rec completed per patient  Allergies reviewed  No PO Antibiotics in the last 30 days     Patient states he does not take medications, RX or OTC

## 2022-08-03 NOTE — ED NOTES
Able to reach the patient's father.  He is unable to come and  the patient d/t being out of the state.  Offered ride to the shelter, pt declined, said he would rather find his own ride, and own place to stay, would not share address with this RN.  Pt ambulating in room with steady gait, able to hold appropriate conversation, A&O x4.

## 2022-08-03 NOTE — ED NOTES
Pt discharged home. IV discontinued and gauze placed, pt in possession of belongings. Pt provided discharge education and information pertaining to medications and follow up appointments. Pt received copy of discharge instructions and verbalized understanding. /60   Pulse 70   Temp 37.1 °C (98.8 °F) (Temporal)   Resp 16   Wt 68 kg (150 lb)   SpO2 98%   BMI 20.34 kg/m²

## 2022-08-03 NOTE — ED NOTES
Per pt request, attempted to call father Thomas Dancer  at [phone], there was no , left voicemail  Next called patient's mother Laurie Stoll at [phone], updated mother per pt's request.  Mother said that she did not live in the area, but would try to contact another family member to  the patient.

## 2022-08-03 NOTE — DISCHARGE INSTRUCTIONS
Using all methamphetamines.  Follow-up with the [facility] for a new primary doctor.

## 2022-08-09 ENCOUNTER — HOSPITAL ENCOUNTER (EMERGENCY)
Facility: MEDICAL CENTER | Age: 22
End: 2022-08-10
Attending: EMERGENCY MEDICINE
Payer: COMMERCIAL

## 2022-08-09 DIAGNOSIS — R74.8 ELEVATED CK: ICD-10-CM

## 2022-08-09 DIAGNOSIS — F15.10 METHAMPHETAMINE ABUSE (HCC): ICD-10-CM

## 2022-08-09 LAB
ALBUMIN SERPL BCP-MCNC: 4.4 G/DL (ref 3.2–4.9)
ALBUMIN/GLOB SERPL: 1.7 G/DL
ALP SERPL-CCNC: 56 U/L (ref 30–99)
ALT SERPL-CCNC: 18 U/L (ref 2–50)
ANION GAP SERPL CALC-SCNC: 12 MMOL/L (ref 7–16)
AST SERPL-CCNC: 34 U/L (ref 12–45)
BASOPHILS # BLD AUTO: 0.3 % (ref 0–1.8)
BASOPHILS # BLD: 0.03 K/UL (ref 0–0.12)
BILIRUB SERPL-MCNC: 1.7 MG/DL (ref 0.1–1.5)
BUN SERPL-MCNC: 14 MG/DL (ref 8–22)
CALCIUM SERPL-MCNC: 9.3 MG/DL (ref 8.5–10.5)
CHLORIDE SERPL-SCNC: 104 MMOL/L (ref 96–112)
CK SERPL-CCNC: 659 U/L (ref 0–154)
CO2 SERPL-SCNC: 26 MMOL/L (ref 20–33)
CREAT SERPL-MCNC: 0.83 MG/DL (ref 0.5–1.4)
EOSINOPHIL # BLD AUTO: 0.26 K/UL (ref 0–0.51)
EOSINOPHIL NFR BLD: 3 % (ref 0–6.9)
ERYTHROCYTE [DISTWIDTH] IN BLOOD BY AUTOMATED COUNT: 39.6 FL (ref 35.9–50)
GFR SERPLBLD CREATININE-BSD FMLA CKD-EPI: 127 ML/MIN/1.73 M 2
GLOBULIN SER CALC-MCNC: 2.6 G/DL (ref 1.9–3.5)
GLUCOSE SERPL-MCNC: 75 MG/DL (ref 65–99)
HCT VFR BLD AUTO: 40.6 % (ref 42–52)
HGB BLD-MCNC: 14.4 G/DL (ref 14–18)
IMM GRANULOCYTES # BLD AUTO: 0.02 K/UL (ref 0–0.11)
IMM GRANULOCYTES NFR BLD AUTO: 0.2 % (ref 0–0.9)
LYMPHOCYTES # BLD AUTO: 3.47 K/UL (ref 1–4.8)
LYMPHOCYTES NFR BLD: 40.4 % (ref 22–41)
MCH RBC QN AUTO: 31.2 PG (ref 27–33)
MCHC RBC AUTO-ENTMCNC: 35.5 G/DL (ref 33.7–35.3)
MCV RBC AUTO: 87.9 FL (ref 81.4–97.8)
MONOCYTES # BLD AUTO: 0.94 K/UL (ref 0–0.85)
MONOCYTES NFR BLD AUTO: 11 % (ref 0–13.4)
NEUTROPHILS # BLD AUTO: 3.86 K/UL (ref 1.82–7.42)
NEUTROPHILS NFR BLD: 45.1 % (ref 44–72)
NRBC # BLD AUTO: 0 K/UL
NRBC BLD-RTO: 0 /100 WBC
PLATELET # BLD AUTO: 271 K/UL (ref 164–446)
PMV BLD AUTO: 9.5 FL (ref 9–12.9)
POTASSIUM SERPL-SCNC: 3.4 MMOL/L (ref 3.6–5.5)
PROT SERPL-MCNC: 7 G/DL (ref 6–8.2)
RBC # BLD AUTO: 4.62 M/UL (ref 4.7–6.1)
SODIUM SERPL-SCNC: 142 MMOL/L (ref 135–145)
WBC # BLD AUTO: 8.6 K/UL (ref 4.8–10.8)

## 2022-08-09 PROCEDURE — 36415 COLL VENOUS BLD VENIPUNCTURE: CPT

## 2022-08-09 PROCEDURE — 85025 COMPLETE CBC W/AUTO DIFF WBC: CPT

## 2022-08-09 PROCEDURE — 99284 EMERGENCY DEPT VISIT MOD MDM: CPT

## 2022-08-09 PROCEDURE — 82550 ASSAY OF CK (CPK): CPT

## 2022-08-09 PROCEDURE — 80053 COMPREHEN METABOLIC PANEL: CPT

## 2022-08-09 PROCEDURE — 700105 HCHG RX REV CODE 258: Performed by: EMERGENCY MEDICINE

## 2022-08-09 RX ORDER — SODIUM CHLORIDE 9 MG/ML
1000 INJECTION, SOLUTION INTRAVENOUS ONCE
Status: COMPLETED | OUTPATIENT
Start: 2022-08-09 | End: 2022-08-09

## 2022-08-09 RX ADMIN — SODIUM CHLORIDE 1000 ML: 9 INJECTION, SOLUTION INTRAVENOUS at 22:06

## 2022-08-09 ASSESSMENT — FIBROSIS 4 INDEX: FIB4 SCORE: 0.64

## 2022-08-09 NOTE — ED TRIAGE NOTES
"Pt BIB [facility] with c/c of abnormal behavior. Pt was called after he had been pacing in front of a store for hours. [employer] called [facility] for abnormal behavior. Pt admits to meth says \"I am feeling not good I feel extra weird\"   "

## 2022-08-10 VITALS
HEART RATE: 59 BPM | RESPIRATION RATE: 14 BRPM | WEIGHT: 148.81 LBS | TEMPERATURE: 98.5 F | BODY MASS INDEX: 20.16 KG/M2 | DIASTOLIC BLOOD PRESSURE: 62 MMHG | HEIGHT: 72 IN | SYSTOLIC BLOOD PRESSURE: 101 MMHG | OXYGEN SATURATION: 96 %

## 2022-08-10 NOTE — ED PROVIDER NOTES
ED Provider Note    Scribed for Clarence Hill M.D. by Monica Buitrago. 8/9/2022, 8:30 PM.    Primary care provider: Pcp Pt States None  Means of arrival: EMS  History obtained from: Patient  History limited by: None    CHIEF COMPLAINT  Chief Complaint   Patient presents with    Drug Abuse       HPI  Thomas James Dancer V is a 22 y.o. male who presents to the Emergency Department for drug abuse. The patient has a history of methamphetamine use, last use was earlier today. The patient has no complaints or pain. He denies any headache, chest pain, dyspnea, abdominal pain, nausea, vomiting, or fever. Per nursing note, the patient was pacing in front of a store prior to arrival so [facility] was called to bring him to the [facility] ED. Patient had recent rhabdomyolysis. He denies suicidal ideation, homicidal ideation, or hallucination.     REVIEW OF SYSTEMS  Pertinent positives include drug abuse and abnormal behavior. Pertinent negatives include no headache, chest pain, dyspnea, abdominal pain, nausea, vomiting, fever, suicidal ideation, homicidal ideation, or hallucination. All other systems negative.    PAST MEDICAL HISTORY    None noted     SURGICAL HISTORY  patient denies any surgical history    SOCIAL HISTORY  Social History     Tobacco Use    Smoking status: Current Every Day Smoker    Smokeless tobacco: Never Used   Vaping Use    Vaping Use: Never used   Substance Use Topics    Alcohol use: Yes    Drug use: Yes     Comment: meth      Social History     Substance and Sexual Activity   Drug Use Yes    Comment: meth       FAMILY HISTORY  No family history noted.     CURRENT MEDICATIONS  No current outpatient medications     ALLERGIES  Allergies   Allergen Reactions    Bee Anaphylaxis and Swelling     Patient also reports he swells where he gets stung        PHYSICAL EXAM  VITAL SIGNS: /73   Pulse (!) 58   Temp 37.1 °C (98.7 °F) (Temporal)   Resp 16   Ht 1.829 m (6')   Wt 67.5 kg (148 lb 13 oz)   SpO2 99%    BMI 20.18 kg/m²     Constitutional: Well developed, Well nourished, Mild distress.   HENT: Normocephalic, Atraumatic, mask in place.  Eyes: Conjunctiva normal, No discharge.   Cardiovascular: Normal heart rate, Normal rhythm, No murmurs, equal pulses.   Pulmonary: Normal breath sounds, No respiratory distress, No wheezing, No rales, No rhonchi.  Abdomen:Soft, No tenderness, No masses, no rebound, no guarding.   Musculoskeletal: No major deformities noted, No tenderness.   Skin: Warm, Dry, No erythema, No rash.   Neurologic: Alert & oriented x 3, Normal motor function,  No focal deficits noted.   Psychiatric: Somnolent but arouses to voice. Denies suicidal ideation, homicidal ideation, or hallucination.     LABS  Results for orders placed or performed during the hospital encounter of 08/09/22   CBC WITH DIFFERENTIAL   Result Value Ref Range    WBC 8.6 4.8 - 10.8 K/uL    RBC 4.62 (L) 4.70 - 6.10 M/uL    Hemoglobin 14.4 14.0 - 18.0 g/dL    Hematocrit 40.6 (L) 42.0 - 52.0 %    MCV 87.9 81.4 - 97.8 fL    MCH 31.2 27.0 - 33.0 pg    MCHC 35.5 (H) 33.7 - 35.3 g/dL    RDW 39.6 35.9 - 50.0 fL    Platelet Count 271 164 - 446 K/uL    MPV 9.5 9.0 - 12.9 fL    Neutrophils-Polys 45.10 44.00 - 72.00 %    Lymphocytes 40.40 22.00 - 41.00 %    Monocytes 11.00 0.00 - 13.40 %    Eosinophils 3.00 0.00 - 6.90 %    Basophils 0.30 0.00 - 1.80 %    Immature Granulocytes 0.20 0.00 - 0.90 %    Nucleated RBC 0.00 /100 WBC    Neutrophils (Absolute) 3.86 1.82 - 7.42 K/uL    Lymphs (Absolute) 3.47 1.00 - 4.80 K/uL    Monos (Absolute) 0.94 (H) 0.00 - 0.85 K/uL    Eos (Absolute) 0.26 0.00 - 0.51 K/uL    Baso (Absolute) 0.03 0.00 - 0.12 K/uL    Immature Granulocytes (abs) 0.02 0.00 - 0.11 K/uL    NRBC (Absolute) 0.00 K/uL   COMP METABOLIC PANEL   Result Value Ref Range    Sodium 142 135 - 145 mmol/L    Potassium 3.4 (L) 3.6 - 5.5 mmol/L    Chloride 104 96 - 112 mmol/L    Co2 26 20 - 33 mmol/L    Anion Gap 12.0 7.0 - 16.0    Glucose 75 65 - 99 mg/dL     Bun 14 8 - 22 mg/dL    Creatinine 0.83 0.50 - 1.40 mg/dL    Calcium 9.3 8.5 - 10.5 mg/dL    AST(SGOT) 34 12 - 45 U/L    ALT(SGPT) 18 2 - 50 U/L    Alkaline Phosphatase 56 30 - 99 U/L    Total Bilirubin 1.7 (H) 0.1 - 1.5 mg/dL    Albumin 4.4 3.2 - 4.9 g/dL    Total Protein 7.0 6.0 - 8.2 g/dL    Globulin 2.6 1.9 - 3.5 g/dL    A-G Ratio 1.7 g/dL   CREATINE KINASE   Result Value Ref Range    CPK Total 659 (H) 0 - 154 U/L   ESTIMATED GFR   Result Value Ref Range    GFR (CKD-EPI) 127 >60 mL/min/1.73 m 2      All labs reviewed by me    COURSE & MEDICAL DECISION MAKING  Pertinent Labs & Imaging studies reviewed. (See chart for details)    8:30 PM - Patient seen and examined at bedside. Discussed plan of care with patient, including  labs. Patient verbalizes understanding and support with the plan of care. Ordered CMP, CBC w/ Diff, and Creatine Kinase to evaluate his symptoms. The differential diagnoses include but are not limited to: methamphetamine abuse, rhabdomyolysis, dehydration, or electrolyte abnormalities.     Medical Decision Making: Patient presents after bizarre behavior with pacing in front of a store after using methamphetamines.  He has a recent history of rhabdomyolysis.  He was given Versed here to help calm him and with that he is now sleeping.  He does have a slightly elevated CPK for which I will give him IV fluids.  Patient has no other medical complaints at this point time after IV fluids I think he can be discharged safely he is not suicidal homicidal and does not seem to be psychotic at this point.    The patient will return for new or worsening symptoms and is stable at the time of discharge.    The patient is referred to a primary physician for blood pressure management, diabetic screening, and for all other preventative health concerns.    DISPOSITION:  Patient will be discharged home in stable condition.    FOLLOW UP:  No follow-up provider specified.    FINAL IMPRESSION  1. Methamphetamine  abuse (HCC)    2. Elevated CK          Monica MELISSA (Scribe), am scribing for, and in the presence of, Clarence Hill M.D.    Electronically signed by: Monica Buitrago (Escobaribferoz), 8/9/2022    Clarence MELISSA M.D. personally performed the services described in this documentation, as scribed by Monica Buitrago in my presence, and it is both accurate and complete. C.     The note accurately reflects work and decisions made by me.  Clarence Hill M.D.  8/9/2022  11:34 PM

## 2022-08-10 NOTE — ED NOTES
Per pt, he was kicked ou of parent's house recently. SW contacted, pt provided homeless resource packet.

## 2022-08-10 NOTE — ED NOTES
Pt A/Ox4 and able to ambulate with steady gait. Pt provided discharge instructions and follow-up information. Pt verbalized understanding and all questions answered. PIV removed. Pt ambulated from ED carrying all belongings.

## 2022-09-16 ENCOUNTER — HOSPITAL ENCOUNTER (EMERGENCY)
Facility: MEDICAL CENTER | Age: 22
End: 2022-09-16
Attending: EMERGENCY MEDICINE
Payer: COMMERCIAL

## 2022-09-16 VITALS
DIASTOLIC BLOOD PRESSURE: 77 MMHG | HEIGHT: 71 IN | HEART RATE: 87 BPM | OXYGEN SATURATION: 99 % | RESPIRATION RATE: 18 BRPM | SYSTOLIC BLOOD PRESSURE: 128 MMHG | BODY MASS INDEX: 20.3 KG/M2 | WEIGHT: 145 LBS | TEMPERATURE: 98 F

## 2022-09-16 DIAGNOSIS — F15.10 METHAMPHETAMINE USE (HCC): ICD-10-CM

## 2022-09-16 DIAGNOSIS — F41.9 ANXIETY: ICD-10-CM

## 2022-09-16 DIAGNOSIS — F19.94 SUBSTANCE INDUCED MOOD DISORDER (HCC): ICD-10-CM

## 2022-09-16 LAB
AMPHET UR QL SCN: POSITIVE
BARBITURATES UR QL SCN: NEGATIVE
BENZODIAZ UR QL SCN: NEGATIVE
BZE UR QL SCN: NEGATIVE
CANNABINOIDS UR QL SCN: POSITIVE
METHADONE UR QL SCN: NEGATIVE
OPIATES UR QL SCN: NEGATIVE
OXYCODONE UR QL SCN: NEGATIVE
PCP UR QL SCN: NEGATIVE
PROPOXYPH UR QL SCN: NEGATIVE

## 2022-09-16 PROCEDURE — A9270 NON-COVERED ITEM OR SERVICE: HCPCS | Performed by: EMERGENCY MEDICINE

## 2022-09-16 PROCEDURE — 99284 EMERGENCY DEPT VISIT MOD MDM: CPT

## 2022-09-16 PROCEDURE — 80307 DRUG TEST PRSMV CHEM ANLYZR: CPT

## 2022-09-16 PROCEDURE — 700102 HCHG RX REV CODE 250 W/ 637 OVERRIDE(OP): Performed by: EMERGENCY MEDICINE

## 2022-09-16 RX ORDER — LORAZEPAM 1 MG/1
1 TABLET ORAL ONCE
Status: COMPLETED | OUTPATIENT
Start: 2022-09-16 | End: 2022-09-16

## 2022-09-16 RX ADMIN — LORAZEPAM 1 MG: 1 TABLET ORAL at 12:50

## 2022-09-16 ASSESSMENT — FIBROSIS 4 INDEX: FIB4 SCORE: 0.65

## 2022-09-16 NOTE — ED NOTES
MD at bedside.    Patient resting comfortably at this time. Disoriented to place (thinks he's at [facility]'s emergency room) but able to state why he's here, date, president, etc. No combativeness or impulsivity at this time. Precedex drip weaned down.     Dr. Weller notified of patient's condition. MD stated ok to discontinue sitter at this time. Will trial patient without sitter.

## 2022-09-16 NOTE — ED NOTES
Pt A & O x2.  Name & Birthday.  Pt mumbling, follows direction. Pt palpating thigh with both hands.  Pt unable to hold cup of water,  RN attempt to reposition hand, but pt said it hurt and resuming palpating thigh with hands.  Pt allowed RN to place pill in mouth and pt was able to swallow water. Pt refused to provided urine sample will re-attempt.

## 2022-09-16 NOTE — ED TRIAGE NOTES
"BIB [facility] to rm 33  Chief Complaint   Patient presents with    Psych Eval     Coming from the [facility], \"not acting normal\"      Pt denies any drugs or alcohol, denies any pain, AOx4. Pt fidgeting with his clothes and pulled off his pulse ox.   "

## 2022-09-16 NOTE — ED PROVIDER NOTES
"ED Provider Note    CHIEF COMPLAINT  Chief Complaint   Patient presents with    Psych Eval     Coming from the [facility], \"not acting normal\"      History is limited.  Patient appears to be altered/on substance, possibly meth    HPI  Thomas James Dancer V is a 22 y.o. male who presents for evaluation of behavioral change, \"not acting normal.\"    Patient states \"things feel strange.\"  Patient denies drug and alcohol use.  Patient denies pain.      ALLERGIES  Allergies   Allergen Reactions    Bee Anaphylaxis and Swelling     Patient also reports he swells where he gets stung        CURRENT MEDICATIONS  Unable to report    PAST MEDICAL HISTORY     Meth use per chart review    SURGICAL HISTORY  patient denies any surgical history    SOCIAL HISTORY  Social History     Tobacco Use    Smoking status: Every Day    Smokeless tobacco: Never   Vaping Use    Vaping Use: Never used   Substance and Sexual Activity    Alcohol use: Yes    Drug use: Yes     Comment: meth    Sexual activity: Not on file       Family Hx:  Unobtainable given patient's altered mental state      REVIEW OF SYSTEMS  Complete review of systems is unobtainable given patient's altered mental state    PHYSICAL EXAM  VITAL SIGNS: BP (!) 147/62   Pulse (!) 118   Temp 36.7 °C (98 °F) (Temporal)   Resp 20   Ht 1.803 m (5' 11\")   Wt 65.8 kg (145 lb)   SpO2 96%   BMI 20.22 kg/m²     General:  WD thin male, nontoxic but anxious appearing; alert, oriented to person; V/S as above; elevated blood pressure, tachycardic  Skin: warm and dry; good color; no rash  HEENT: NCAT; EOMs intact; PERRL; no scleral icterus   Neck: FROM; no stridor  Cardiovascular: Tachycardic heart rate and regular rhythm.  No murmurs, rubs, or gallops; pulses 2+ bilaterally radially and DP areas  Lungs: Clear to auscultation with good air movement bilaterally.  No wheezes, rhonchi, or rales.   Abdomen: BS present; soft; NTND; no rebound, guarding, or rigidity.  No organomegaly or pulsatile " mass  Extremities: STERLING x 4; no pedal edema; neg Gina's  Neurologic: CNs III-XII grossly intact; speech clear; distal sensation intact; strength 5/5 UE/LEs  Psychiatric: Appropriate affect, anxious mood    LABS  UDS and breathalyzer pending    MEDICAL RECORD  I have reviewed patient's medical record and pertinent results are listed below.    UDS positive for methamphetamines in the past    COURSE & MEDICAL DECISION MAKING  I have reviewed any medical record information, laboratory studies and radiographic results as noted.    Thomas James Dancer V is a 22 y.o. male who presents for evaluation of behavioral change.  Patient appears to be under the influence of methamphetamine.  I have seen this patient in the past with similar behavior of rubbing his thigh, tachycardia, and apparently responding to internal stimuli.    Patient was given Ativan.  Heart rate improved to 108.  Patient appears calmer.    We will observe for clearance of suspected methamphetamines.  No further imaging or labs are indicated at this time.  Patient be signed out to Dr. Angel for reevaluation.  [facility] will be contacted to see if the patient may go back there.      IMPRESSION  1. Anxiety        2. Methamphetamine use (HCC)            Electronically signed by: Jamia Larsen M.D., 9/16/2022 12:23 PM

## 2022-09-16 NOTE — ED PROVIDER NOTES
ED Physician Note    I assumed care of this patient at shift change.  He remained well-appearing, and improved after he received Ativan.  On my reassessment he is awake and alert, he is not showing any signs of psychosis or intoxication, he is looking for a place to stay and asked if he could stay in the hospital.  I explained that he was not able to stay here, but we were able to have a peer counselor talk to him and provide him with outpatient resources, as well as shelter options.  He is discharged home in stable condition.

## 2022-09-17 NOTE — ED NOTES
"Pt more alert at this time.  Following commands, ambulated to restroom without assist.  Gait steady.  Able to provide urine sample.   Pt reports taking \"bong loads\" states its could have been \"laced\"  "

## 2022-09-17 NOTE — ED NOTES
Pt educated on DC instructions and follow up as directed.  Pt verbalized understanding.  PT reports that he's been working with the [facility].  PT provided map with direction to location.  VVS.  ABC's intact.  Pt ambulated well without assist.  Gait steady.  Pt is alert and following direction.s  All  Questions answered.

## 2022-09-17 NOTE — ED NOTES
Per Peer Support, pt does not need their assistance, since already receiving assistance to another group.  Pt is free to be DC at this time.

## 2022-09-23 ENCOUNTER — HOSPITAL ENCOUNTER (EMERGENCY)
Facility: MEDICAL CENTER | Age: 22
End: 2022-09-24
Attending: EMERGENCY MEDICINE
Payer: COMMERCIAL

## 2022-09-23 DIAGNOSIS — R46.89 AGGRESSIVE BEHAVIOR: ICD-10-CM

## 2022-09-23 DIAGNOSIS — F19.10 SUBSTANCE ABUSE (HCC): ICD-10-CM

## 2022-09-23 DIAGNOSIS — F23 ACUTE PSYCHOSIS (HCC): Primary | ICD-10-CM

## 2022-09-23 DIAGNOSIS — F15.929 METHAMPHETAMINE INTOXICATION (HCC): ICD-10-CM

## 2022-09-23 PROCEDURE — 700111 HCHG RX REV CODE 636 W/ 250 OVERRIDE (IP)

## 2022-09-23 PROCEDURE — 96372 THER/PROPH/DIAG INJ SC/IM: CPT

## 2022-09-23 PROCEDURE — 99285 EMERGENCY DEPT VISIT HI MDM: CPT

## 2022-09-23 PROCEDURE — 302970 POC BREATHALIZER: Performed by: EMERGENCY MEDICINE

## 2022-09-23 PROCEDURE — 700111 HCHG RX REV CODE 636 W/ 250 OVERRIDE (IP): Performed by: EMERGENCY MEDICINE

## 2022-09-23 RX ORDER — MIDAZOLAM HYDROCHLORIDE 1 MG/ML
INJECTION INTRAMUSCULAR; INTRAVENOUS
Status: DISPENSED
Start: 2022-09-23 | End: 2022-09-24

## 2022-09-23 RX ORDER — HALOPERIDOL 5 MG/ML
5 INJECTION INTRAMUSCULAR ONCE
Status: COMPLETED | OUTPATIENT
Start: 2022-09-23 | End: 2022-09-23

## 2022-09-23 RX ORDER — LORAZEPAM 2 MG/ML
2 INJECTION INTRAMUSCULAR ONCE
Status: DISCONTINUED | OUTPATIENT
Start: 2022-09-23 | End: 2022-09-23

## 2022-09-23 RX ORDER — HALOPERIDOL 5 MG/ML
INJECTION INTRAMUSCULAR
Status: COMPLETED
Start: 2022-09-23 | End: 2022-09-23

## 2022-09-23 RX ORDER — DIPHENHYDRAMINE HYDROCHLORIDE 50 MG/ML
50 INJECTION INTRAMUSCULAR; INTRAVENOUS ONCE
Status: COMPLETED | OUTPATIENT
Start: 2022-09-23 | End: 2022-09-23

## 2022-09-23 RX ORDER — LORAZEPAM 1 MG/1
1 TABLET ORAL ONCE
Status: DISCONTINUED | OUTPATIENT
Start: 2022-09-23 | End: 2022-09-23

## 2022-09-23 RX ORDER — MIDAZOLAM HYDROCHLORIDE 5 MG/ML
4 INJECTION INTRAMUSCULAR; INTRAVENOUS ONCE
Status: COMPLETED | OUTPATIENT
Start: 2022-09-23 | End: 2022-09-23

## 2022-09-23 RX ADMIN — MIDAZOLAM HYDROCHLORIDE 4 MG: 5 INJECTION, SOLUTION INTRAMUSCULAR; INTRAVENOUS at 21:45

## 2022-09-23 RX ADMIN — HALOPERIDOL 5 MG: 5 INJECTION INTRAMUSCULAR at 21:29

## 2022-09-23 RX ADMIN — HALOPERIDOL LACTATE 5 MG: 5 INJECTION, SOLUTION INTRAMUSCULAR at 21:29

## 2022-09-23 RX ADMIN — DIPHENHYDRAMINE HYDROCHLORIDE 50 MG: 50 INJECTION, SOLUTION INTRAMUSCULAR; INTRAVENOUS at 21:45

## 2022-09-23 ASSESSMENT — FIBROSIS 4 INDEX: FIB4 SCORE: 0.65

## 2022-09-24 VITALS
SYSTOLIC BLOOD PRESSURE: 123 MMHG | OXYGEN SATURATION: 97 % | WEIGHT: 145 LBS | TEMPERATURE: 98 F | DIASTOLIC BLOOD PRESSURE: 59 MMHG | RESPIRATION RATE: 16 BRPM | BODY MASS INDEX: 20.22 KG/M2 | HEART RATE: 100 BPM

## 2022-09-24 LAB
AMPHET UR QL SCN: POSITIVE
BARBITURATES UR QL SCN: NEGATIVE
BENZODIAZ UR QL SCN: POSITIVE
BZE UR QL SCN: NEGATIVE
CANNABINOIDS UR QL SCN: NEGATIVE
METHADONE UR QL SCN: NEGATIVE
OPIATES UR QL SCN: NEGATIVE
OXYCODONE UR QL SCN: NEGATIVE
PCP UR QL SCN: NEGATIVE
POC BREATHALIZER: 0 PERCENT (ref 0–0.01)
PROPOXYPH UR QL SCN: NEGATIVE

## 2022-09-24 PROCEDURE — 80307 DRUG TEST PRSMV CHEM ANLYZR: CPT

## 2022-09-24 PROCEDURE — 90791 PSYCH DIAGNOSTIC EVALUATION: CPT

## 2022-09-24 NOTE — ED PROVIDER NOTES
ED Provider Note    Patient:Thomas James Dancer V  Patient : 2000  Patient MRN: [id number]  Patient PCP: Pcp Pt States None    Admit Date: 2022  Discharge Date and Time: 22 7:05 AM  Discharge Diagnosis: Methamphetamine intoxication, acute psychosis, aggressive behavior  Discharge Attending: Herve Joyner M.D.  Discharge Service: ED Observation    ED Course  Parminder is a 22 y.o. male who was evaluated at [facility] after being found in the community with potentially self-harm behavior walking in and out of traffic.  Patient was brought to the emergency department and found to be tachycardic, responding to internal stimuli, aggressive behavior, psychomotor agitation.  Patient was found to have toxicology screen consistent with amphetamine intoxication.  Given patient's aggressive behavior and self-harm he was given chemical sedation with Haldol, Benadryl, Versed.  Patient was hemodynamically stable and was able to rest in his hospital bed for the next 6 hours.  Patient was reevaluated myself and is interactive, pleasant, conversant, no longer psychotic, ambulating without assistance, alert and oriented x3.  Patient will be transferred back to his group home.    Discharge Exam:  /58   Pulse 90   Temp 37.2 °C (98.9 °F) (Temporal)   Resp 16   Wt 65.8 kg (145 lb)   SpO2 94%   BMI 20.22 kg/m² .    Constitutional: Awake and alert. Nontoxic  HENT:  Grossly normal  Eyes: Grossly normal  Neck: Normal range of motion  Cardiovascular: Normal heart rate   Thorax & Lungs: No respiratory distress  Abdomen: Nontender  Skin:  No pathologic rash.   Extremities: Well perfused  Psychiatric: Affect normal    Labs  Results for orders placed or performed during the hospital encounter of 22   URINE DRUG SCREEN   Result Value Ref Range    Amphetamines Urine Positive (A) Negative    Barbiturates Negative Negative    Benzodiazepines Positive (A) Negative    Cocaine Metabolite Negative Negative    Methadone  Negative Negative    Opiates Negative Negative    Oxycodone Negative Negative    Phencyclidine -Pcp Negative Negative    Propoxyphene Negative Negative    Cannabinoid Metab Negative Negative       Radiology  No orders to display         Medications:   New Prescriptions    No medications on file       Discharge Condition: Stable    Electronically signed by: Herve Joyner M.D., 9/24/2022 7:05 AM

## 2022-09-24 NOTE — DISCHARGE PLANNING
Pt A&Ox4, denies suicidal ideation. Pt states he was walking in traffic looking for a phone. He identifies a plan to return to the [facility]. Provided pt with outpatient resources accessible with Medicaid pending, including [facility], [facility], [facility], [facility] and [facility]. Provided pt with Alert Team MH resource packet and [facility] information (no peer recover specialist available at this time). Pt verbalizes understanding of resources.

## 2022-09-24 NOTE — DISCHARGE PLANNING
Alert Team:     Pt given chemical sedation and currently sleeping. Unable to complete behavioral health consult at this time.

## 2022-09-24 NOTE — ED NOTES
The pt is laying in bed with eyes closed. Breathing is even and unlabored. Vitals stable on the monitor

## 2022-09-24 NOTE — ED NOTES
RN x2 gave a benadryl 50mg, versed 4mg, and haldol 5mg in 3 separate syringes in 3 separate muscles, IM. The pt willingly took the meds.

## 2022-09-24 NOTE — ED NOTES
Security at bedside to help ge tht ept undressed. The pt uncooperative, responding to internal stimuli, but is not combative

## 2022-09-24 NOTE — ED NOTES
The pt is laying in bed. telesitter initiated. The pt arousable to voice. Vitals stable and the pt hooked up to the monitor
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
## 2022-09-24 NOTE — ED NOTES
The pt is laying in bed with eyes closed. Breathing is even and unlabored. telesitter still in place. Vitals stable on the monitor

## 2022-09-24 NOTE — ED NOTES
Report from el at this time. Pt in nad, resting at this time. resp even and unlabored, skin pwd. Awaiting to hear about transport back to group home around 8am, tele sitter present

## 2022-09-24 NOTE — ED NOTES
The pt laying in bed with eyes closed. Breathing is even and unlabored. Vitals stable and on the monitor.

## 2022-09-24 NOTE — ED TRIAGE NOTES
Chief Complaint   Patient presents with    Behavioral Problem     Medics report that the pt was found pacing in and out of traffic, unaware of his surroundings. Pt was placed on L2K in field. The pt is non-aggressive       BIB EMS to 36, pt on monitor and in gown, labs drawn and sent. Pt consists of: pacing around room, avoiding eye contact, and seems to talk to self.    /84   Pulse (!) 154   Resp 16   Wt 65.8 kg (145 lb)   SpO2 96%   BMI 20.22 kg/m²

## 2022-09-24 NOTE — DISCHARGE PLANNING
Alert Team:    Pt was found pacing in and out of traffic unaware of his surroundings and brought to ED. Placed on legal hold by [facility] team. Pt uncooperative on arrival to ED and pacing in his room; non aggressive.   No risk on [place] scale. Telesitter ordered for elopement/safety risk.

## 2022-09-24 NOTE — DISCHARGE PLANNING
Alert Team:    Attempted to call the [facility] to discuss discharge plan; unable to reach them at this time. Will continue to attempt making contact.     [facility]  [phone]   oral

## 2022-09-24 NOTE — ED NOTES
Patient unwilling to participate in med rec process, closes eyes and will not respond when first name called or when he is spoken to.

## 2022-09-24 NOTE — ED PROVIDER NOTES
ED Provider Note    Scribed for Miguel Angel Curry by Miguel Angel Curry. 9/24/2022  1:31 AM    Primary care provider: Pcp Pt States None  Means of arrival: EMS  History obtained from: Patient  History limited by: None    CHIEF COMPLAINT  Chief Complaint   Patient presents with    Behavioral Problem     Medics report that the pt was found pacing in and out of traffic, unaware of his surroundings. Pt was placed on L2K in field. The pt is non-aggressive     HPI  Thomas James Dancer V is a 22 y.o. male who presents to the Emergency Department with acute psychosis.  Patient has a history of substance abuse.  Suspected substance abuse today and patient was found with aggressive and potentially self harming behavior of walking in and out of traffic, and appear to be acutely psychotic and suspected likely toxic encephalopathy.  He has brought here to the ED tachycardic and unable to provide much usable history.    Limited history as patient unable to provide further review of systems other than EMS report.    REVIEW OF SYSTEMS  Limited review of systems secondary to patient's altered mental status and acute psychosis.  See HPI for further details.     PAST MEDICAL HISTORY     SURGICAL HISTORY  patient denies any surgical history  SOCIAL HISTORY  Social History     Tobacco Use    Smoking status: Every Day    Smokeless tobacco: Never   Vaping Use    Vaping Use: Never used   Substance Use Topics    Alcohol use: Yes    Drug use: Yes     Comment: meth      Social History     Substance and Sexual Activity   Drug Use Yes    Comment: meth     FAMILY HISTORY  No family history on file.  CURRENT MEDICATIONS  Home Medications       Reviewed by Emilio Conner (Pharmacy Tech) on 09/23/22 at 1702  Med List Status: Unable to Obtain     Medication Last Dose Status        Patient Reji Taking any Medications                         ALLERGIES  Allergies   Allergen Reactions    Bee Anaphylaxis and Swelling     Patient also reports he  swells where he gets stung        PHYSICAL EXAM    VITAL SIGNS:   Vitals:    09/23/22 2043 09/23/22 2045   BP:  135/84   Pulse:  (!) 154   Resp:  16   SpO2:  96%   Weight: 65.8 kg (145 lb)      Vitals: My interpretation: normotensive, tachycardic, afebrile, not hypoxic    Reinterpretation of vitals: Significantly improved    Cardiac Monitor Interpretation: The cardiac monitor revealed normal Sinus Rhythm with initial tachycardia that is improving as interpreted by me. The cardiac monitor was ordered secondary to the patient's history of altered mental status and tachycardia and to monitor for dysrhythmia and/or tachycardia.    PE:   Constitutional: Well developed, Well nourished, No acute distress, Non-toxic appearance.   HENT: Normocephalic, Atraumatic, Bilateral external ears normal, Oropharynx is clear mucous membranes are moist. No oral exudates or nasal discharge.   Eyes: Pupils are equal round and reactive, EOMI, Conjunctiva normal, No discharge.   Neck: Normal range of motion, No tenderness, Supple, No stridor. No meningismus.  Lymphatic: No lymphadenopathy noted.   Cardiovascular: Regular rate and rhythm without murmur rub or gallop.  Thorax & Lungs: Clear breath sounds bilaterally without wheezes, rhonchi or rales. There is no chest wall tenderness.   Abdomen: Soft non-tender non-distended. There is no rebound or guarding. No organomegaly is appreciated. Bowel sounds are normal.  Skin: Normal without rash.   Back: No CVA or spinal tenderness.   Extremities: Intact distal pulses, No edema, No tenderness, No cyanosis, No clubbing. Capillary refill is less than 2 seconds.  Musculoskeletal: Good range of motion in all major joints. No tenderness to palpation or major deformities noted.   Neurologic: Alert & oriented x 1, Normal motor function, Normal sensory function, No focal deficits noted. Reflexes are normal.  Psychiatric: Psychomotor agitation.  Appears acutely agitated, with obvious psychosis, appears to  be responding to internal stimuli with possible hallucinations.  Paranoid affect.  Currently denies suicidal or homicidal ideation.    DIAGNOSTIC STUDIES / PROCEDURES    LABS    Pending    RADIOLOGY  No orders to display     The radiologist's interpretation of all radiological studies have been reviewed by me.    COURSE & MEDICAL DECISION MAKING  Nursing notes, VS, PMSFHx, labs, imaging, EKG reviewed in chart.    MDM: 1:31 AM Thomas James Dancer V is a 22 y.o. male who presented with acute psychomotor agitation, aggressive behavior, paranoia, suspected substance abuse, acute psychosis and likely toxic encephalopathy.  Brought in by EMS after being found wandering in traffic.  He has stigmata of methamphetamine intoxication and has a history of similar presentations to the ED.  Unfortunately he is not able to provide much history as he has signs and symptoms of acute psychosis.  There is concerns for aggressive behavior on initial presentation and patient unwilling to be redirected or follow commands and required chemical sedation with Haldol, Benadryl and Versed.  He initially shows up with tachycardia but hemodynamic stability and after chemical sedation he significantly improved his vital signs normal upon reevaluation.  He was placed on ED observation pending urine drug screen and alcohol level.  He will be evaluated by the alert team when appropriate.  Signed out to Dr. Herve Joyner who will follow up on reevaluation once the patient has cleared his sedation and is alert and oriented.    FINAL IMPRESSION  1. Acute psychosis (HCC) Acute   2. Substance abuse (HCC) Acute   3. Aggressive behavior Acute      The note accurately reflects work and decisions made by me.  Miguel Angel Curry  9/24/2022  1:31 AM

## 2022-09-24 NOTE — ED NOTES
Pt continues to pace, security at bedside. Pt is seen walking towards the pt. ERP notified and stated will see the pt and order meds

## 2022-09-24 NOTE — ED NOTES
The pt ambulated to the restroom with a steady gait. The pt is alert and oriented. The pt re-hooked back up to the monitor. The pt is calm and cooperative.

## 2022-10-04 ENCOUNTER — HOSPITAL ENCOUNTER (EMERGENCY)
Facility: MEDICAL CENTER | Age: 22
End: 2022-10-04
Attending: EMERGENCY MEDICINE
Payer: COMMERCIAL

## 2022-10-04 VITALS
SYSTOLIC BLOOD PRESSURE: 122 MMHG | DIASTOLIC BLOOD PRESSURE: 67 MMHG | OXYGEN SATURATION: 98 % | RESPIRATION RATE: 19 BRPM | WEIGHT: 150 LBS | BODY MASS INDEX: 19.88 KG/M2 | HEART RATE: 77 BPM | TEMPERATURE: 97.8 F | HEIGHT: 73 IN

## 2022-10-04 DIAGNOSIS — F15.90 AMPHETAMINE USE: ICD-10-CM

## 2022-10-04 PROCEDURE — A9270 NON-COVERED ITEM OR SERVICE: HCPCS | Performed by: EMERGENCY MEDICINE

## 2022-10-04 PROCEDURE — 99284 EMERGENCY DEPT VISIT MOD MDM: CPT

## 2022-10-04 PROCEDURE — 700102 HCHG RX REV CODE 250 W/ 637 OVERRIDE(OP): Performed by: EMERGENCY MEDICINE

## 2022-10-04 RX ORDER — LORAZEPAM 2 MG/1
2 TABLET ORAL ONCE
Status: COMPLETED | OUTPATIENT
Start: 2022-10-04 | End: 2022-10-04

## 2022-10-04 RX ADMIN — LORAZEPAM 2 MG: 2 TABLET ORAL at 12:33

## 2022-10-04 ASSESSMENT — ENCOUNTER SYMPTOMS: NERVOUS/ANXIOUS: 1

## 2022-10-04 ASSESSMENT — LIFESTYLE VARIABLES: SUBSTANCE_ABUSE: 1

## 2022-10-04 ASSESSMENT — FIBROSIS 4 INDEX: FIB4 SCORE: 0.65

## 2022-10-04 NOTE — PROGRESS NOTES
Spiritual Care Note    Patient Information     Patient's Name: Thomas James Dancer V   MRN: [id number]    YOB: 2000   Age and Gender: 22 y.o. male   Service Area: ED [facility]   Room (and Bed):  29/29 [facility]   Ethnicity or Nationality:     Primary Language: English   Sikhism/Spiritual preference: Methodist   Place of Residence: [place]   Family/Friends/Others Present: No   Clinical Team Present: No   Medical Diagnosis(-es)/Procedure(s):    Code Status: Prior    Date of Admission: 10/4/2022   Length of Stay: 0 days        Spiritual Care Provider Information:  Name of Spiritual Care Provider: Arti Dempsey  Title of Spiritual Care Provider: Volunteer   Phone Number: [phone]  E-mail: [email or web address]   Total time : 10 minutes    Spiritual Screen Results:    Gen Nursing        Palliative Care         Encounter/Request Information  Encounter/Request Type   Visited With: Patient  Nature of the Visit: Initial, On shift  General Visit: Yes  Referral From/ Origin of Request: [place] rounds, Verbal patient    Religous Needs/Values  Sikhism Needs Visit  Sikhism Needs: Prayer    Spiritual Assessment   Spiritual Care Encounters  Observations/Symptoms: Accepting, Thankfulness  Interacton/Conversation: Pt, whose speech was slow and halting, requested prayer and thanked the .  Assessment: Need  Need: Seeking Spiritual Assistance and Support  Interventions: Compassionate presence, prayer.  Outcomes: Spiritual Comfort  Plan: Visit Upon Request    Notes:

## 2022-10-04 NOTE — ED TRIAGE NOTES
Chief Complaint   Patient presents with    Paranoid     BIB by EMS for paranoia. EMS picked him up at [place] [employer] station where the  representative called 911 because the patient was walking around and exhibiting paranoia. Patient admits to meth use 4 hours ago. On arrival patient is tachycardic, jaw clenching, follows commands with frequent stimulation.       Patient admitted 1 week ago for substance abuse, acute psychosis and aggressive behavior.

## 2022-10-04 NOTE — ED NOTES
Patient ambulated to [facility] with EMS, changed into hospital gown and vital signs obtained. Bed locked and in lowest position.

## 2022-10-04 NOTE — ED PROVIDER NOTES
ED Provider Note    Scribed for Raffaele Gruber M.D. by Leroy Park. 10/4/2022, 12:21 PM.    Primary care provider: Pcp Pt States None  Means of arrival: EMS  History obtained from: Patient  History limited by: None noted    CHIEF COMPLAINT  Chief Complaint   Patient presents with    Paranoid     BIB by EMS for paranoia. EMS picked him up at the [employer] station where the  representative called 911 because the patient was walking around and exhibiting paranoia. Patient admits to meth use 4 hours ago. On arrival patient is tachycardic, jaw clenching, follows commands with frequent stimulation.        HPI  Thomas James Dancer V is a 22 y.o. male who presents to the Emergency Department via EMS for evaluation of altered mentation and paranoia onset unclear.  He was [place] [employer] station.  He describes using methamphetamines approximately 4 hours ago. Per nursing, the patient was tachycardic with jaw clenching on arrival, but follows commands with frequent stimulation. Per EMS, the patient was at the [employer] station when the  representative called 911 on the patient's behalf.  Patient is too altered to provide much history at this time.  Did admit to EMS he was using meth.    REVIEW OF SYSTEMS  Review of Systems   Psychiatric/Behavioral:  Positive for substance abuse. The patient is nervous/anxious.    Unable to obtain review of systems secondary to altered mentation.    PAST MEDICAL HISTORY   None noted    SURGICAL HISTORY  patient denies any surgical history    SOCIAL HISTORY  Social History     Tobacco Use    Smoking status: Every Day    Smokeless tobacco: Never   Vaping Use    Vaping Use: Never used   Substance Use Topics    Alcohol use: Yes    Drug use: Yes     Comment: meth      Social History     Substance and Sexual Activity   Drug Use Yes    Comment: meth       FAMILY HISTORY  No family history noted.    CURRENT MEDICATIONS  Home Medications       Reviewed by Carmelita BORGES  "BALDEMAR Esquivel (Registered Nurse) on 10/04/22 at 1220  Med List Status: Partial     Medication Last Dose Status        Patient Reji Taking any Medications                           ALLERGIES  Allergies   Allergen Reactions    Bee Anaphylaxis and Swelling     Patient also reports he swells where he gets stung        PHYSICAL EXAM  VITAL SIGNS: /75   Pulse (!) 106   Temp 37 °C (98.6 °F) (Temporal)   Resp 16   Ht 1.854 m (6' 1\")   Wt 68 kg (150 lb)   SpO2 94%   BMI 19.79 kg/m²   Vitals reviewed.  Constitutional: Awake alert standing up pacing, answer some questions.  Has occasional incomprehensible speech.  HENT: Normocephalic, Atraumatic, dry mucous membranes  Eyes: PERRL, EOMI, Conjunctiva normal, No discharge.   Neck: Normal range of motion, No tenderness,  Cardiovascular: Normal heart rate, Normal rhythm, No murmurs, No rubs, No gallops.   Thorax & Lungs: Normal breath sounds, No respiratory distress, No wheezing  Abdomen: Bowel sounds normal, Soft, No tenderness  Musculoskeletal: Good range of motion in all major joints.   Neurologic: Alert,  No focal deficits noted.   Psychiatric: Altered, disoriented, unable to answer questions about suicidal ideation or hallucinations.    COURSE & MEDICAL DECISION MAKING  Pertinent Labs & Imaging studies reviewed. (See chart for details)    Chart is reviewed for baseline labs and previous work-up for comparison and evaluation of similar visits.  Including imaging, hospitalization records and labs.     12:21 PM Patient seen and examined at bedside. The patient presents with anxiety onset just prior to arrival, and the differential diagnosis includes but is not limited to Methamphetamine use, or Chronic psychosis. Patient will be treated with Ativan 2 mg PO for his symptoms.  Patient has a history of methamphetamine use and frequent visits for such.  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  We will treat him with benzodiazepines and have him eat " and drink and observe him until he is more clear.  Be reassessed once his mental status has improved.    5:45 PM - Patient was reevaluated at bedside. The patient is waking up at this time.    1945.  Patient is reassessed.  He is much more awake and alert although not making sense.  Admits using methamphetamine today.  Knows where he is.  Knows where he lives.  We will give him some food and fluids and try to get him home once he is a little bit more clear.  He is disposition will be discharged home in a more appropriate mental status.  He will be turned over to my partner for reassessment and ultimate disposition.  He is agreeable with the plan.      [facility]  [address]  [place] [address]  Schedule an appointment as soon as possible for a visit in 2 days        FINAL IMPRESSION  1. Amphetamine use          Leroy MELISSA (Scribe), am scribing for, and in the presence of, Raffaele Gruber M.D..    Electronically signed by: Leroy Park (Leah), 10/4/2022    Raffaele MELISSA M.D. personally performed the services described in this documentation, as scribed by Leroy Park in my presence, and it is both accurate and complete. C.    The note accurately reflects work and decisions made by me.  Raffaele Gruber M.D.  10/4/2022  7:39 PM

## 2022-10-04 NOTE — ED NOTES
Patient medicated per MAR. Patient still exhibiting paranoia and needs constant redirection/instruction.

## 2022-10-05 NOTE — ED NOTES
Got repeat vitals, the patient is currently drinking water and eating mac and cheese.  Patient is still not making clear sense at this time.   When asking the patient a question he starts to ramble on various other topics, but can be directable.   Will attempt to ambulate the patient when he is finished eating.

## 2022-10-05 NOTE — ED NOTES
Patient is able to ambulate with no problems. Has tolerated PO challenge.   Called [facility] to let him them know he will be on his way back shortly.

## 2022-10-05 NOTE — ED NOTES
Report given to LANCE Sanchez. Patient in stable condition. Respiration evens and unlabored. No signs of distress at this time.

## 2022-10-16 ENCOUNTER — HOSPITAL ENCOUNTER (EMERGENCY)
Facility: MEDICAL CENTER | Age: 22
End: 2022-10-16
Attending: EMERGENCY MEDICINE
Payer: COMMERCIAL

## 2022-10-16 VITALS
HEIGHT: 73 IN | OXYGEN SATURATION: 97 % | DIASTOLIC BLOOD PRESSURE: 59 MMHG | HEART RATE: 86 BPM | TEMPERATURE: 97.2 F | RESPIRATION RATE: 16 BRPM | BODY MASS INDEX: 20.16 KG/M2 | WEIGHT: 152.12 LBS | SYSTOLIC BLOOD PRESSURE: 116 MMHG

## 2022-10-16 DIAGNOSIS — F15.10 METHAMPHETAMINE ABUSE (HCC): ICD-10-CM

## 2022-10-16 PROCEDURE — 700111 HCHG RX REV CODE 636 W/ 250 OVERRIDE (IP): Performed by: EMERGENCY MEDICINE

## 2022-10-16 PROCEDURE — 99284 EMERGENCY DEPT VISIT MOD MDM: CPT

## 2022-10-16 PROCEDURE — 700102 HCHG RX REV CODE 250 W/ 637 OVERRIDE(OP): Performed by: EMERGENCY MEDICINE

## 2022-10-16 PROCEDURE — 96372 THER/PROPH/DIAG INJ SC/IM: CPT

## 2022-10-16 PROCEDURE — A9270 NON-COVERED ITEM OR SERVICE: HCPCS | Performed by: EMERGENCY MEDICINE

## 2022-10-16 RX ORDER — HALOPERIDOL 5 MG/ML
5 INJECTION INTRAMUSCULAR ONCE
Status: COMPLETED | OUTPATIENT
Start: 2022-10-16 | End: 2022-10-16

## 2022-10-16 RX ORDER — LORAZEPAM 1 MG/1
1 TABLET ORAL ONCE
Status: COMPLETED | OUTPATIENT
Start: 2022-10-16 | End: 2022-10-16

## 2022-10-16 RX ADMIN — HALOPERIDOL LACTATE 5 MG: 5 INJECTION, SOLUTION INTRAMUSCULAR at 07:19

## 2022-10-16 RX ADMIN — LORAZEPAM 1 MG: 1 TABLET ORAL at 05:57

## 2022-10-16 ASSESSMENT — FIBROSIS 4 INDEX: FIB4 SCORE: 0.65

## 2022-10-16 NOTE — ED TRIAGE NOTES
"Chief Complaint   Patient presents with    Leg Pain     Throbbing leg pain x 1 day, pt denies injury.        23 yo M to triage for above complaint. Pt endorses using meth a few days ago. Pt is withdraw and difficult to obtain answers from in triage.      Pt placed in lobby. Pt educated on triage process. Pt encouraged to alert staff for any changes.     Patient and staff wearing appropriate PPE    BP (!) 132/95   Pulse 67   Temp 36.1 °C (96.9 °F) (Temporal)   Resp 16   Ht 1.854 m (6' 1\")   Wt 69 kg (152 lb 1.9 oz)   SpO2 95%   BMI 20.07 kg/m²     "

## 2022-10-16 NOTE — ED PROVIDER NOTES
"ED Provider Note    ED Provider Note    Primary care provider: Pcp Pt States None  Means of arrival: POV  History obtained from: patient  History limited by: none    CHIEF COMPLAINT  Chief Complaint   Patient presents with    Leg Pain     Throbbing leg pain x 1 day, pt denies injury.        HPI  Thomas James Dancer V is a 22 y.o. male who presents to the Emergency Department with a chief complaint of leg pain.  Patient denies any recent injuries.  States he has been scrubbing pain for 1 day.  History is limited.    REVIEW OF SYSTEMS  Review of Systems   Reason unable to perform ROS: intoxication.     PAST MEDICAL HISTORY  Methamphetamine use    SURGICAL HISTORY  patient denies any surgical history    SOCIAL HISTORY  Social History     Tobacco Use    Smoking status: Every Day    Smokeless tobacco: Never   Vaping Use    Vaping Use: Never used   Substance Use Topics    Alcohol use: Yes    Drug use: Yes     Comment: meth      Social History     Substance and Sexual Activity   Drug Use Yes    Comment: meth       FAMILY HISTORY  History reviewed. No pertinent family history.    CURRENT MEDICATIONS  Home Medications       Reviewed by Veena Mast R.N. (Registered Nurse) on 10/16/22 at 0432  Med List Status: Not Addressed     Medication Last Dose Status        Patient Reji Taking any Medications                           ALLERGIES  Allergies   Allergen Reactions    Bee Anaphylaxis and Swelling     Patient also reports he swells where he gets stung        PHYSICAL EXAM  VITAL SIGNS: BP (!) 132/95   Pulse (!) 101   Temp 36.1 °C (96.9 °F) (Temporal)   Resp 16   Ht 1.854 m (6' 1\")   Wt 69 kg (152 lb 1.9 oz)   SpO2 95%   BMI 20.07 kg/m²   Vitals reviewed.  Constitutional: Patient is oriented to person, place, and time. Appears well-developed and well-nourished. No distress.    Head: Normocephalic and atraumatic.   Mouth/Throat: Oropharynx is clear and moist, no exudates.   Eyes: Conjunctivae are normal. Pupils are " equal and round.  Neck: Normal range of motion.  Cardiovascular: Normal rate, regular rhythm and normal heart sounds. Normal peripheral pulses.  Pulmonary/Chest: Effort normal and breath sounds normal. No respiratory distress, no wheezes, rhonchi, or rales.   Abdominal: Soft. Bowel sounds are normal. There is no tenderness.   Musculoskeletal: No edema and no tenderness.  No asymmetry when the lower extremities are compared bilaterally.  Normal distal pulses.  No deformities.  Neurological: No focal deficits.   Skin: Skin is warm and dry. No erythema. No pallor.   Psychiatric: flat affected.    COURSE & MEDICAL DECISION MAKING  Pertinent Labs & Imaging studies reviewed. (See chart for details)    Obtained and reviewed past medical records.  Patient's last encounter was earlier this month, October 4.  He was picked up by EMS for paranoia and exhibited amphetamine toxidrome.  He is noted to have multiple prior ED visits for similar encounters.    4:39 AM - Patient seen and examined at bedside.  This is a 22-year-old male.  He has a history of methamphetamine use.  He has been in the emergency department multiple times in the past with methamphetamine intoxication.  He is complaining of leg pain.  There is no evidence of trauma, abrasions, bruising, ecchymosis, deformities or asymmetry.  He is requesting a medication that he has apparently received in the past and on review of his prior records, this appears to be Ativan.  I suspect, that he is intoxicated with methamphetamines at this time.  He is grinding his teeth.  He has a blank stare but is redirectable with stimulation and can answer some simple questions.  He is cooperative no history is somewhat limited.  Will decreased stimulation, provide oral Ativan and continue to monitor.    9:10 AM patient's reevaluated bedside. He has been noted, ambulating around the department previously without any difficulty.  Patient was somewhat redirectable, demonstrating some  bizarre behavior, warranting treatment with IM Haldol and now he is resting and appears comfortable.    11:40 AM reevaluated the bedside.  He is resting and appears calm he wakens with ease now.  He is able to give me some past medical history.  He is feeling markedly better.  He is tolerating fluids.  I do not feel there is further need for intervention.  He has reassuring vital signs.  He is provided dietary snacks.  I have spoken with peer counselor, regarding offering this patient substance abuse services.  Anticipate discharge to home shortly.      FINAL IMPRESSION  1. Methamphetamine abuse (HCC)

## 2022-10-16 NOTE — ED NOTES
"Patient is completely unable to follow conversations. Patient states \"please take the shoes out of the room, they are hurting me\" patient seems fixated on his shoes so RN complied with request, patient continues to seem unable to focus and easily distracted by stimuli that aren't obvious to this RN.   "

## 2022-10-16 NOTE — ED NOTES
Pt provided with discharge instructions. Pt had no further questions. Pt ambulated to [facility], provided phone number for [facility] so he can call them and discuss possibility for readmission to facility.

## 2022-11-23 ENCOUNTER — HOSPITAL ENCOUNTER (EMERGENCY)
Facility: MEDICAL CENTER | Age: 22
End: 2022-11-24
Attending: EMERGENCY MEDICINE
Payer: MEDICAID

## 2022-11-23 DIAGNOSIS — F15.929 METHAMPHETAMINE INTOXICATION (HCC): ICD-10-CM

## 2022-11-23 PROCEDURE — 700111 HCHG RX REV CODE 636 W/ 250 OVERRIDE (IP): Performed by: EMERGENCY MEDICINE

## 2022-11-23 PROCEDURE — 99284 EMERGENCY DEPT VISIT MOD MDM: CPT

## 2022-11-23 PROCEDURE — 96372 THER/PROPH/DIAG INJ SC/IM: CPT

## 2022-11-23 RX ORDER — LORAZEPAM 2 MG/ML
2 INJECTION INTRAMUSCULAR ONCE
Status: COMPLETED | OUTPATIENT
Start: 2022-11-23 | End: 2022-11-23

## 2022-11-23 RX ORDER — DIPHENHYDRAMINE HYDROCHLORIDE 50 MG/ML
50 INJECTION INTRAMUSCULAR; INTRAVENOUS ONCE
Status: COMPLETED | OUTPATIENT
Start: 2022-11-23 | End: 2022-11-23

## 2022-11-23 RX ORDER — HALOPERIDOL 5 MG/ML
10 INJECTION INTRAMUSCULAR ONCE
Status: COMPLETED | OUTPATIENT
Start: 2022-11-23 | End: 2022-11-23

## 2022-11-23 RX ADMIN — DIPHENHYDRAMINE HYDROCHLORIDE 50 MG: 50 INJECTION, SOLUTION INTRAMUSCULAR; INTRAVENOUS at 16:50

## 2022-11-23 RX ADMIN — HALOPERIDOL LACTATE 10 MG: 5 INJECTION, SOLUTION INTRAMUSCULAR at 16:50

## 2022-11-23 RX ADMIN — LORAZEPAM 2 MG: 2 INJECTION INTRAMUSCULAR; INTRAVENOUS at 17:00

## 2022-11-23 ASSESSMENT — FIBROSIS 4 INDEX: FIB4 SCORE: 0.65

## 2022-11-23 NOTE — ED TRIAGE NOTES
Chief Complaint   Patient presents with    Other     Pt brought back to [facility] from lobby by security. Pt is wandering around room aimlessly. Pt is not answering many questions at this time. Pt does deny SI/HI at this time. Pt knows year, and where he is. Pt appears too distracted in room to answer many other questions.

## 2022-11-24 VITALS
SYSTOLIC BLOOD PRESSURE: 119 MMHG | HEART RATE: 94 BPM | OXYGEN SATURATION: 98 % | BODY MASS INDEX: 21 KG/M2 | HEIGHT: 71 IN | WEIGHT: 150 LBS | DIASTOLIC BLOOD PRESSURE: 77 MMHG | TEMPERATURE: 98 F | RESPIRATION RATE: 18 BRPM

## 2022-11-24 NOTE — ED NOTES
Pt resting comfortably in bed, pt has equal rise and fall of chest. No other needs at the current time.

## 2022-11-24 NOTE — ED NOTES
Pt discharge information provided with education. Pt verbalized an understanding. Pt stable for discharge. Pt given information for MTM at this time. Pt ambulated out with a steady gait.

## 2022-11-24 NOTE — ED NOTES
Pt resting comfortably In bed, no needs at the current time. Pt has equal rise and fall of chest.

## 2022-11-24 NOTE — ED PROVIDER NOTES
"ED Provider Note  Patient:Thomas James Dancer V  Patient : 2000  Patient MRN: [id number]  Patient PCP: Pcp Pt States None    Admit Date: 2022  Discharge Date and Time: 22  Discharge Diagnosis:   1. Methamphetamine intoxication (HCC)       Discharge Attending: Juanito Hoffman D.O.  Discharge Service: ED Observation    ED Course  Parminder is a 22 y.o. male who was evaluated at [facility] for evaluation of acute methamphetamine intoxication as well as agitation.  Patient was sedated for patient and staff safety he was observed in the emergency department for several hours and had achieved clinical sobriety prior to discharge patient was awake alert answering questions appropriately he had no somatic complaints is ambulatory with a steady gait thus it was felt discharge was reasonable.    Discharge Exam:  /74   Pulse 88   Temp 36.7 °C (98 °F) (Temporal)   Resp 16   Ht 1.803 m (5' 11\")   Wt 68 kg (150 lb)   SpO2 97%   BMI 20.92 kg/m² .    Constitutional: Awake and alert. Nontoxic  HENT:  Grossly normal  Eyes: Grossly normal  Neck: Normal range of motion  Cardiovascular: No JVD, RRR   Thorax & Lungs: No respiratory distress  Abdomen: Non-distended  Skin:  No pathologic rash.   Extremities: Well perfused  Psychiatric: Affect normal    Labs  Labs Reviewed   URINE DRUG SCREEN   POC BREATHALIZER       EKG  Results for orders placed or performed during the hospital encounter of 22   EKG   Result Value Ref Range    Report       [facility] Emergency Dept.    Test Date:  2022  Pt Name:    THOMAS DANCER                Department: ER  MRN:        [id number]                      [facility]  Gender:     Male                         Technician: [id number]  :        2000                   Requested By:DIPIKA GRAY  Order #:    [id number]                    Ashley MD:    Measurements  Intervals                                " Axis  Rate:       74                           P:          67  AL:         152                          QRS:        87  QRSD:       96                           T:          33  QT:         396  QTc:        440    Interpretive Statements  SINUS RHYTHM  ST ELEV, PROBABLE NORMAL EARLY REPOL PATTERN  Compared to ECG 06/10/2022 18:03:12  Sinus tachycardia no longer present  Right-axis deviation no longer present  ST (T wave) deviation still present         Radiology  No orders to display       Disposition:     Home in care of self in a stable condition follow up       [facility] - [facility]al [facility]  [address] [facility] [address]  [phone]  Schedule an appointment as soon as possible for a visit         Medications:   New Prescriptions    No medications on file

## 2022-11-24 NOTE — ED NOTES
Pt woke up and requested to go to the . Pt ambulated with a steady gait to the room. Pt back in bed and stating that he is still extremely tired. ERP notified.

## 2022-11-24 NOTE — ED NOTES
Pt resting comfortably in bed, pt has equal rise and fall of chest. Pt hooked up to cardiac monitor/bp cuff and pulse ox. No other needs at the current time.

## 2022-11-24 NOTE — ED PROVIDER NOTES
ED Provider Note    CHIEF COMPLAINT  Chief Complaint   Patient presents with    Other       HPI  Thomas James Dancer V is a 22 y.o. male who presents with bizarre behavior.  Patient was seen relatively frequently in our emergency department for psychosis in the setting of methamphetamine abuse.  Patient admits to using methamphetamine earlier today.  He has no complaints, but reports he simply was trying to get comfortable.  Patient initially was brought back to the room, once in the room he stated he would no longer want to be in the emergency department, left the emergency department and then walked immediately back to his room.  Patient denies any suicidal homicidal ideation.  Patient is disorganized, with a presentation most consistent with methamphetamine abuse, therefore history is significantly limited    REVIEW OF SYSTEMS  ROS    Limited secondary to intoxication    PAST MEDICAL HISTORY       SOCIAL HISTORY  Social History     Tobacco Use    Smoking status: Every Day    Smokeless tobacco: Never   Vaping Use    Vaping Use: Never used   Substance and Sexual Activity    Alcohol use: Yes    Drug use: Yes     Comment: meth    Sexual activity: Not on file       SURGICAL HISTORY  patient denies any surgical history    CURRENT MEDICATIONS  Home Medications    **Home medications have not yet been reviewed for this encounter**         ALLERGIES  Allergies   Allergen Reactions    Bee Anaphylaxis and Swelling     Patient also reports he swells where he gets stung        PHYSICAL EXAM  There were no vitals filed for this visit.    Physical Exam  Constitutional:       Appearance: He is well-developed.   HENT:      Head: Normocephalic and atraumatic.   Eyes:      Conjunctiva/sclera: Conjunctivae normal.      Comments: Mildly dilated pupils   Cardiovascular:      Rate and Rhythm: Regular rhythm. Tachycardia present.      Heart sounds: No murmur heard.    No friction rub. No gallop.   Pulmonary:      Effort: Pulmonary effort  is normal. No respiratory distress.      Breath sounds: Normal breath sounds. No wheezing.   Abdominal:      General: Bowel sounds are normal. There is no distension.      Palpations: Abdomen is soft.      Tenderness: There is no abdominal tenderness. There is no rebound.   Musculoskeletal:      Cervical back: Normal range of motion and neck supple.   Skin:     General: Skin is warm and dry.   Neurological:      Mental Status: He is alert and oriented to person, place, and time.   Psychiatric:      Comments: Pacing around the room, tangential speech, pressured speech, appears to be responding to internal stimuli, denies suicidal homicidal ideation         DIAGNOSTIC STUDIES / PROCEDURES      COURSE & MEDICAL DECISION MAKING  Pertinent Labs & Imaging studies reviewed. (See chart for details)    Patient here with longstanding history of methamphetamine abuse, admitting to using methamphetamine earlier today.  His exam is consistent with sympathomimetic toxidrome.  Outside patient's tachycardia he is otherwise hemodynamically stable.  Patient is mildly agitated, he was given Haldol, Benadryl and Ativan to help sedate him.  Patient will be observed in the emergency department until clinically sober, he has been signed out to my partner.  After patient received medications he is resting comfortably.    FINAL IMPRESSION  1.  Methamphetamine intoxication, sympathomimetic toxidrome    Electronically signed by: Moshe Palmer M.D., 11/23/2022 4:14 PM

## 2022-12-11 ENCOUNTER — HOSPITAL ENCOUNTER (EMERGENCY)
Facility: MEDICAL CENTER | Age: 22
End: 2022-12-11
Attending: EMERGENCY MEDICINE
Payer: COMMERCIAL

## 2022-12-11 VITALS
WEIGHT: 150 LBS | RESPIRATION RATE: 20 BRPM | HEIGHT: 71 IN | HEART RATE: 120 BPM | OXYGEN SATURATION: 95 % | SYSTOLIC BLOOD PRESSURE: 154 MMHG | BODY MASS INDEX: 21 KG/M2 | DIASTOLIC BLOOD PRESSURE: 92 MMHG

## 2022-12-11 DIAGNOSIS — F19.90 DRUG USE: ICD-10-CM

## 2022-12-11 PROCEDURE — 99284 EMERGENCY DEPT VISIT MOD MDM: CPT

## 2022-12-11 PROCEDURE — 700102 HCHG RX REV CODE 250 W/ 637 OVERRIDE(OP): Performed by: EMERGENCY MEDICINE

## 2022-12-11 PROCEDURE — A9270 NON-COVERED ITEM OR SERVICE: HCPCS | Performed by: EMERGENCY MEDICINE

## 2022-12-11 RX ORDER — DIAZEPAM 5 MG/1
5 TABLET ORAL ONCE
Status: COMPLETED | OUTPATIENT
Start: 2022-12-11 | End: 2022-12-11

## 2022-12-11 RX ADMIN — DIAZEPAM 5 MG: 5 TABLET ORAL at 04:00

## 2022-12-11 ASSESSMENT — FIBROSIS 4 INDEX: FIB4 SCORE: 0.65

## 2022-12-11 NOTE — ED NOTES
Pt alert and oriented x4 pacing around the room unable to state a chief complaint, pt continues to be uncooperative. [facility] was called by RN and availability for rooms would open at 0445, Pt provided cab voucher, t-shirt, and sweatshirt. RN attempted to give pt d/c instructions with no indication of learning. Pt escorted to the lobby via ambulation

## 2022-12-11 NOTE — ED TRIAGE NOTES
"Chief Complaint   Patient presents with    Psych Eval     Pt found to be pacing out in front of [facility] x2 days, pt performing repetitive motions and pacing in room, pt talking to self, RN unable to understand pt     Ht 1.803 m (5' 11\")   Wt 68 kg (150 lb)   BMI 20.92 kg/m²     Pt brought in by [facility] to [address], mask in place. Pt in a gown and on monitor. Chart flagged for ERP to see.    Pt uncooperative and unable to obtain vitals  "

## 2022-12-11 NOTE — ED PROVIDER NOTES
"ED Provider Note        Means of Arrival: EMS  History obtained from: Patient, EMS    CHIEF COMPLAINT  Chief Complaint   Patient presents with    Psych Eval     Pt found to be pacing out in front of [facility] x2 days, pt performing repetitive motions and pacing in room, pt talking to self, RN unable to understand pt       HPI  Thomas James Dancer V is a 22 y.o. male who presents after walking from the [facility].  The patient states that he was pacing in front of the [facility] And someone called the ambulance.  He denies any complaints.  He is not forthcoming about whether he has used any drugs.  He denies any chest pain.  He does report that he is somewhat cold but declines warm blanket.      REVIEW OF SYSTEMS    CONSTITUTIONAL:  No fever.  CARDIOVASCULAR:  No chest discomfort.  RESPIRATORY:  No pleuritic chest pain.  GASTROINTESTINAL:  No abdominal pain.  See HPI for further details.     PAST MEDICAL HISTORY  No past medical history on file.    FAMILY HISTORY  No family history on file.    SOCIAL HISTORY   reports that he has been smoking. He has never used smokeless tobacco. He reports current alcohol use. He reports current drug use.    SURGICAL HISTORY  No past surgical history on file.    CURRENT MEDICATIONS  Home Medications    **Home medications have not yet been reviewed for this encounter**         ALLERGIES  Allergies   Allergen Reactions    Bee Anaphylaxis and Swelling     Patient also reports he swells where he gets stung        PHYSICAL EXAM  VITAL SIGNS: BP (!) 154/92   Pulse (!) 120   Resp 20   Ht 1.803 m (5' 11\")   Wt 68 kg (150 lb)   SpO2 95%   BMI 20.92 kg/m²    Gen: alert, no acute distress, pacing  HENT: ATNC  Eyes: normal conjunctiva  Resp: No respiratory distress  CV: No JVD, RRR  Abd: Non-distended  Extremities: No deformity, warm and well-perfused  Neuro: Alert, able to answer questions appropriately  Psych: Slightly anxious appearing, fidgeting with his hands, pacing.  Occasionally " circumstantial thought but redirectable.        COURSE & MEDICAL DECISION MAKING  Pertinent Labs & Imaging studies reviewed. (See chart for details)    Review of medical record demonstrates the patient was recently seen for methamphetamine intoxication.  His clinical presentation appears to fit with the same, including his tachycardia and fidgetiness.  He does not appear to be risk to himself.  He has a logical plan requesting a voucher to return back to his shelter.  The patient was agreeable to taking some oral diazepam, although this had minimal effect on the patient's pacing.  He was continuously walking out of his room and fidgeting needing constant supervision and redirection.  He does not appear to be a danger to himself.  This does not appear to be a primary psychiatric disorder to indicate need for legal hold.  He does demonstrate logical thought.  Patient was provided with warm clothing, cab voucher, and he has space available at the warming hot.  We will plan for discharge at this time    Appropriate PPE were worn during this encounter.    FINAL IMPRESSION  1. Drug use         DISPOSITION:  Patient will be discharged home in stable condition.    FOLLOW UP:  [facility] - [facility]al Health Counsel[facility]  [address] [facility] [address]  [phone]        [facility] - Primary Care and Family Medicine  [address] [facility] [address]  [phone]        [facility], Emergency Dept  [address] [facility] [address] [phone]    If symptoms worsen        This dictation was created using voice recognition software. The accuracy of the dictation is limited to the abilities of the software. I expect there may be some errors of grammar and possibly content. The nursing notes were reviewed and certain aspects of this information were incorporated into this note.

## 2022-12-12 ENCOUNTER — HOSPITAL ENCOUNTER (EMERGENCY)
Facility: MEDICAL CENTER | Age: 22
End: 2022-12-12
Attending: EMERGENCY MEDICINE
Payer: COMMERCIAL

## 2022-12-12 VITALS
RESPIRATION RATE: 16 BRPM | TEMPERATURE: 98.4 F | SYSTOLIC BLOOD PRESSURE: 118 MMHG | HEART RATE: 97 BPM | BODY MASS INDEX: 20.32 KG/M2 | WEIGHT: 150 LBS | OXYGEN SATURATION: 95 % | HEIGHT: 72 IN | DIASTOLIC BLOOD PRESSURE: 74 MMHG

## 2022-12-12 DIAGNOSIS — R44.3 HALLUCINATIONS: ICD-10-CM

## 2022-12-12 DIAGNOSIS — F19.10 SUBSTANCE ABUSE (HCC): ICD-10-CM

## 2022-12-12 LAB — POC BREATHALIZER: 0 PERCENT (ref 0–0.01)

## 2022-12-12 PROCEDURE — 700102 HCHG RX REV CODE 250 W/ 637 OVERRIDE(OP): Performed by: EMERGENCY MEDICINE

## 2022-12-12 PROCEDURE — 99285 EMERGENCY DEPT VISIT HI MDM: CPT

## 2022-12-12 PROCEDURE — A9270 NON-COVERED ITEM OR SERVICE: HCPCS | Performed by: EMERGENCY MEDICINE

## 2022-12-12 PROCEDURE — 90791 PSYCH DIAGNOSTIC EVALUATION: CPT

## 2022-12-12 PROCEDURE — 302970 POC BREATHALIZER: Performed by: EMERGENCY MEDICINE

## 2022-12-12 RX ORDER — DIPHENHYDRAMINE HCL 25 MG
25 TABLET ORAL ONCE
Status: COMPLETED | OUTPATIENT
Start: 2022-12-12 | End: 2022-12-12

## 2022-12-12 RX ORDER — LORAZEPAM 1 MG/1
1 TABLET ORAL ONCE
Status: COMPLETED | OUTPATIENT
Start: 2022-12-12 | End: 2022-12-12

## 2022-12-12 RX ORDER — HALOPERIDOL 5 MG/1
5 TABLET ORAL ONCE
Status: COMPLETED | OUTPATIENT
Start: 2022-12-12 | End: 2022-12-12

## 2022-12-12 RX ADMIN — LORAZEPAM 1 MG: 1 TABLET ORAL at 10:38

## 2022-12-12 RX ADMIN — DIPHENHYDRAMINE HYDROCHLORIDE 25 MG: 25 TABLET ORAL at 10:38

## 2022-12-12 RX ADMIN — HALOPERIDOL 5 MG: 5 TABLET ORAL at 10:38

## 2022-12-12 ASSESSMENT — FIBROSIS 4 INDEX: FIB4 SCORE: 0.65

## 2022-12-12 NOTE — ED PROVIDER NOTES
ED Provider Note    CHIEF COMPLAINT  Chief Complaint   Patient presents with    Hallucinations     Pt arrives via EMS on legal hold due to standing outside of [facility] x 3 hours appearing to be responding to internal stimulus in clothing inappropriate for the weather. Pt states he had not slept in a long time, had finally gotten to sleep when a staff member at [facility] woke him up and thought he was doing something inappropriate with paper towels in his pants. Pt states that he decided it was best to go outside. Pt at this time is cooperative, does appear to be responding to internal stimuli.       HPI  Thomas James Dancer V is a 22 y.o. male who presents with a chief complaint of hallucinations.    History is provided by the nurses note it appears that EMS was notified from [facility] because patient was positive putting his hand in his pants and perhaps masturbating.  It is uncertain if this is truly the case.  However, the patient was outside of track responding to return stimuli and was not clothed appropriately.  Therefore Patient, legal hold    .  The patient states that he may have taken methamphetamines.  Patient has rambling speech there are some flight of ideas.  He denies any self-harm.    Reviewed in the last 10-12 visits the patient has been here multiple times for methamphetamine use and hallucinations.  Patient does not state that he is ever had a mental health disorder.    REVIEW OF SYSTEMS  Review of systems are difficult obtain because the patient is a poor historian and rambling    PAST MEDICAL HISTORY  Stri of methamphetamine use.    FAMILY HISTORY  No family history on file.    SOCIAL HISTORY  Patient is currently at the [facility] was for homeless people    CURRENT MEDICATIONS  Clines taking any behavior health medications    ALLERGIES  Allergies   Allergen Reactions    Bee Anaphylaxis and Swelling     Patient also reports he swells where he gets stung        PHYSICAL EXAM  VITAL  SIGNS: /75   Pulse (!) 110   Temp 37.1 °C (98.8 °F) (Temporal)   Resp 16   Ht 1.829 m (6')   Wt 68 kg (150 lb)   SpO2 97%   BMI 20.34 kg/m²    Constitutional: Well developed, Well nourished, no acute distress,   HENT: Normocephalic, Atraumatic, Oropharynx moist, No oral exudates, Nose normal.   Eyes: PERRLA, EOMI, Conjunctiva normal, No discharge.   Musculoskeletal: Neck normal range of motion, No tenderness, Supple,  Cardiovascular: Regular rhythm rate of 110 no murmurs  Thorax & Lungs: There to auscultation bilaterally  Abdomen: Bowel sounds normal, Soft, No tenderness, No masses, No pulsatile masses.   Skin: Warm, Dry, No erythema, No rash.   : No CVA tenderness.   Neurologic: Alert & oriented , moves all extremities equally  Psychiatric: Flight of ideas.  Pressured speech mildly.      COURSE & MEDICAL DECISION MAKING  Pertinent Labs & Imaging studies reviewed. (See chart for details)  22-year-old male with past multiple visits for methamphetamine use seen earlier on the 11th at 4:00 in the morning for possible methamphetamine use and pacing around the [facility].  Likely the patient has returned and they do not feel comfortable with the patient and is been put on legal hold    Altered mental status  Most likely methamphetamine use less likely intracranial hemorrhage encephalitis or other infectious etiology or intracranial injury allergy    Plan  Haldol Ativan Benadryl  Reevaluate    Legal 2000  At this point appears because the patient was out in the cold unable to take care of himself.  At this point we will go ahead and have the alert team come see the patient's  Alcohol  .  2:19 PM  Patient is resting a regular wake up to be seen by the psychiatric service.  FINAL IMPRESSION  1.  Methamphetamine use  2.  Altered mental status  3.      Electronically signed by: James Salazar M.D., 12/12/2022 10:34 AM

## 2022-12-12 NOTE — ED NOTES
Remains sleeping. Resp even and nonlabored. Will cont to monitor.     Sitter remains within line of sight.

## 2022-12-12 NOTE — ED NOTES
Pt laying per cart, but still appears anxious and having lots of extra movement of arms and legs.     Sitter at bedside.

## 2022-12-12 NOTE — ED NOTES
Pt appears to be sleeping at this time. resp even and nonlabored.     Sitter remains within line of sight.

## 2022-12-12 NOTE — ED TRIAGE NOTES
Pt arrives via EMS on legal hold due to standing outside of [facility] x 3 hours appearing to be responding to internal stimulus in clothing inappropriate for the weather. Pt states he had not slept in a long time, had finally gotten to sleep when a staff member at [facility] woke him up and thought he was doing something inappropriate with paper towels in his pants. Pt states that he decided it was best to go outside. Pt at this time is cooperative, does appear to be responding to internal stimuli.   5

## 2022-12-13 NOTE — ED NOTES
Per [facility], pt declining their assistance. States he just wants two sandwiches and a ride back to the shelter. Sandwiches provided. Alert team states that he has resources and is ok for discharge per them. Awaiting ERP to re-eval pt for discharge.

## 2022-12-13 NOTE — ED PROVIDER NOTES
ED Provider Note  Patient:Thomas James Dancer V  Patient : 2000  Patient MRN: [id number]  Patient PCP: Pcp Pt States None    Admit Date: 2022  Discharge Date and Time: 22 7:12 PM  Discharge Diagnosis:   1. Substance abuse (HCC)        2. Hallucinations           Discharge Attending: Priyanka Joyner M.D.  Discharge Service: ED Observation    ED Course  Parminder is a 22 y.o. male who was evaluated at [facility] for hallucinations and substance abuse.  He has been here multiple times for the same.  History of methamphetamine abuse.  Placed on a legal hold as it was felt he was unable to care for himself due to his hallucinations.  He was watched here closely as he metabolized.  He was then seen by the alert team.  He is requesting to go back to [facility].  He is no longer having any hallucinations.  He was spoken to by [facility] but refuses any interventions at this time.  At this point I do believe he is stable to return to [facility].  He is alert.  He has no suicidal ideation.  No longer has any hallucinations.  No current medical complaints.    Discharge Exam:  /75   Pulse 80   Temp 37.1 °C (98.8 °F) (Temporal)   Resp 14   Ht 1.829 m (6')   Wt 68 kg (150 lb)   SpO2 98%   BMI 20.34 kg/m² .    Constitutional: Awake and alert. Nontoxic  HENT:  Grossly normal  Eyes: Grossly normal  Neck: Normal range of motion  Cardiovascular: Normal heart rate   Thorax & Lungs: No respiratory distress  Abdomen: Nontender  Skin:  No pathologic rash.   Extremities: Well perfused  Psychiatric: Affect normal    Labs  Results for orders placed or performed during the hospital encounter of 22   POC Breathalizer   Result Value Ref Range    POC Breathalizer 0.00 0.00 - 0.01 Percent       Radiology  No orders to display         Medications:   New Prescriptions    No medications on file     Discharged from ED observation.    Discharge Condition: Stable    Electronically signed by: Priyanka Joyner M.D.,  12/12/2022 7:12 PM

## 2022-12-13 NOTE — ED NOTES
Pt given belongings and informed he could change. Resources in hand, including how to call [facility] for ride back to shelter.

## 2022-12-13 NOTE — ED NOTES
Pt ambulatory to waiting room stable, with steady gait with all belongings. Verbalized understanding of discharge instructions and need to follow up.

## 2022-12-13 NOTE — CONSULTS
[facility]   TRIAGE ASSESSMENT    Name: Thomas James Dancer V  MRN: [id number]  : 2000  Age: 22 y.o.  Date of assessment: 2022  PCP: Pcp Pt States None  Persons in attendance: Patient  Patient Location: [facility]    CHIEF COMPLAINT/PRESENTING ISSUE (as stated by Patient): Pt BIB EMS after being found outside the [facility] responding to internal stimuli and dressed inappropriately for the weather. Pt has a history of more than 20 ED presentations this year for similar. Pt received PO Haldol, Benadryl and Ativan at 1038. At time of consult, pt is alert, oriented and states he would like to return to the [facility]. Pt does not meet criteria for legal hold at this time. Referred pt to [facility], [facility].   Chief Complaint   Patient presents with    Hallucinations     Pt arrives via EMS on legal hold due to standing outside of [facility] x 3 hours appearing to be responding to internal stimulus in clothing inappropriate for the weather. Pt states he had not slept in a long time, had finally gotten to sleep when a staff member at [facility] woke him up and thought he was doing something inappropriate with paper towels in his pants. Pt states that he decided it was best to go outside. Pt at this time is cooperative, does appear to be responding to internal stimuli.        CURRENT LIVING SITUATION/SOCIAL SUPPORT/FINANCIAL RESOURCES: Homeless, stays at the [facility] or the [facility].     BEHAVIORAL HEALTH/SUBSTANCE USE TREATMENT HISTORY  Does patient/parent report a history of prior behavioral health/substance use treatment for patient?   Pt denies mental health treatment at this time, but EMR documents that he has reported this in the past.     SAFETY ASSESSMENT - SELF  Does patient acknowledge current or past symptoms of dangerousness to self or is previous history noted? no  Does parent/significant other report patient has current or past symptoms  "of dangerousness to self? N\A  Does presenting problem suggest symptoms of dangerousness to self? No    SAFETY ASSESSMENT - OTHERS  Does patient acknowledge current or past symptoms of aggressive behavior or risk to others or is previous history noted? no  Does parent/significant other report patient has current or past symptoms of aggressive behavior or risk to others?  N\A  Does presenting problem suggest symptoms of dangerousness to others? No    LEGAL HISTORY  Does patient acknowledge history of arrest/FCI/MCC or is previous history noted? Yes, drug related charges.    Crisis Safety Plan completed and copy given to patient? N\A    ABUSE/NEGLECT SCREENING  Does patient report feeling “unsafe” in his/her home, or afraid of anyone?  no  Does patient report any history of physical, sexual, or emotional abuse?  no  Does parent or significant other report any of the above? N\A  Is there evidence of neglect by self?  yes  Is there evidence of neglect by a caregiver? no  Does the patient/parent report any history of CPS/APS/police involvement related to suspected abuse/neglect or domestic violence? no  Based on the information provided during the current assessment, is a mandated report of suspected abuse/neglect being made?  No    SUBSTANCE USE SCREENING  Yes:  Jim all substances used in the past 30 days:      Last Use Amount   []   Alcohol     []   Marijuana     []   Heroin     []   Prescription Opioids  (used without prescription, for    recreation, or in excess of prescribed amount)     []   Other Prescription  (used without prescription, for    recreation, or in excess of prescribed amount)     []   Cocaine 12/11/22     [x]   Methamphetamine     []   \"\" drugs (ectasy, MDMA)     []   Other substances        UDS results: Not completed   Breathalyzer results: 0.0    What consequences does the patient associate with any of the above substance use and or addictive behaviors? Legal, Health problems, Monetary " problems.    Risk factors for detox (check all that apply):  []  Seizures   []  Diaphoretic (sweating)   []  Tremors   [x]  Hallucinations   [x]  Increased blood pressure   []  Decreased blood pressure   []  Other   []  None      [x] Patient education on risk factors for detoxification and instructed to return to ER as needed.      MENTAL STATUS   Participation: Active verbal participation, Engaged, and Open to feedback  Grooming: Casual  Orientation: Alert and Fully Oriented  Behavior: Calm  Eye contact: Good  Mood: Euthymic  Affect: Congruent with content  Thought process: Logical, goal-directed  Thought content: Within normal limits  Speech: Soft, hypotalkative  Perception: Within normal limits  Memory:  Poor memory for chronology of events  Insight: Poor  Judgment:  Poor  Other:    Collateral information:    Source:  [] Significant other present in person:   [] Significant other by telephone  [] [facility]   [] [facility] Nursing Staff  [x] [facility]  [] Other:     [] Unable to complete full assessment due to:  [] Acute intoxication  [] Patient declined to participate/engage  [] Patient verbally unresponsive  [] Significant cognitive deficits  [] Significant perceptual distortions or behavioral disorganization  [] Other:      CLINICAL IMPRESSIONS:  Primary:  Methamphetamine induced psychosis  Secondary:  defer       IDENTIFIED NEEDS/PLAN:  [Trigger DISPOSITION list for any items marked]    []  Imminent safety risk - self [] Imminent safety risk - others   [x]  Acute substance withdrawal []  Psychosis/Impaired reality testing   []  Mood/anxiety [x]  Substance use/Addictive behavior   []  Maladaptive behaviro []  Parent/child conflict   []  Family/Couples conflict []  Biomedical   []  Housing []  Financial   []   Legal  Occupational/Educational   []  Domestic violence []  Other:     Recommended Plan of Care:  Refer to [facility], [facility], [facility],  and [facility]  *Telesitter may not be utilized for moderate or high risk patients    Has the Recommended Plan of Care/Level of Observation been reviewed with the patient's assigned nurse? yes    Does patient/parent or guardian express agreement with the above plan? yes  Referral appointment(s) scheduled? yes    Alert team only:   I have discussed findings and recommendations with Dr. Joyner who is in agreement with these recommendations.     Referral information sent to the following outpatient community providers : [facility]    Referral information sent to the following inpatient community providers : [facility]    If applicable : Referred  to  Alert Team for legal hold follow up at (time): discontinued.      Odalys Cano R.N.  12/12/2022

## 2022-12-13 NOTE — DISCHARGE PLANNING
Unable to complete consult at this time. Pt somnolent, difficult to arouse. Will continue to monitor.

## 2022-12-15 PROCEDURE — 99284 EMERGENCY DEPT VISIT MOD MDM: CPT

## 2022-12-15 ASSESSMENT — FIBROSIS 4 INDEX: FIB4 SCORE: 1.22

## 2022-12-16 ENCOUNTER — HOSPITAL ENCOUNTER (EMERGENCY)
Facility: MEDICAL CENTER | Age: 22
End: 2022-12-16
Attending: EMERGENCY MEDICINE
Payer: COMMERCIAL

## 2022-12-16 VITALS
WEIGHT: 150 LBS | OXYGEN SATURATION: 99 % | BODY MASS INDEX: 20.32 KG/M2 | DIASTOLIC BLOOD PRESSURE: 60 MMHG | SYSTOLIC BLOOD PRESSURE: 129 MMHG | TEMPERATURE: 98.1 F | HEIGHT: 72 IN | RESPIRATION RATE: 16 BRPM | HEART RATE: 93 BPM

## 2022-12-16 DIAGNOSIS — F15.10 METHAMPHETAMINE ABUSE (HCC): ICD-10-CM

## 2022-12-16 DIAGNOSIS — R41.82 ALTERED MENTAL STATUS, UNSPECIFIED ALTERED MENTAL STATUS TYPE: ICD-10-CM

## 2022-12-16 LAB — POC BREATHALIZER: 0 PERCENT (ref 0–0.01)

## 2022-12-16 PROCEDURE — 90791 PSYCH DIAGNOSTIC EVALUATION: CPT

## 2022-12-16 PROCEDURE — 302970 POC BREATHALIZER: Performed by: EMERGENCY MEDICINE

## 2022-12-16 NOTE — DISCHARGE SUMMARY
ED Observation Discharge Summary    Patient:Thomas James Dancer V  Patient : 2000  Patient MRN: [id number]  Patient PCP: Pcp Pt States None    Admit Date: 2022  Discharge Date and Time: 22 5:59 AM  Discharge Diagnosis:   1. Altered mental status, unspecified altered mental status type        2. Methamphetamine abuse (HCC)            Discharge Attending: Paris Song D.O.  Discharge Service: ED Observation    ED Course  Parminder is a 22 y.o. male who was evaluated at [facility].  Initially, little history was available from the patient as he was intoxicated.  At suspicious, patient is a methamphetamine user.  He was placed into ED observation by nighttime ERP.    6 AM patient's reevaluated bedside.  He has cleared significantly.  He denies SI or HI.  He has no complaints at this time.  His vital signs are reassuring.  He has been seen by the alert team.  At this time, he is cleared for discharge.  The alert team has suggested, that the patient be provided transportation to the [facility].  Patient's discharged in stable condition.    Discharge Exam:  /74   Pulse 64   Temp 36.7 °C (98 °F) (Temporal)   Resp 14   Ht 1.829 m (6')   Wt 68 kg (150 lb)   SpO2 96%   BMI 20.34 kg/m² .    Constitutional: Awake and alert. Nontoxic  HENT:  Grossly normal  Eyes: Grossly normal  Neck: Normal range of motion  Cardiovascular: Normal heart rate   Thorax & Lungs: No respiratory distress  Abdomen: Nontender  Skin:  No pathologic rash.   Extremities: Well perfused  Psychiatric: Affect normal    Labs  Results for orders placed or performed during the hospital encounter of 22   POC BREATHALIZER   Result Value Ref Range    POC Breathalizer 0.000 0.00 - 0.01 Percent     Discharge Condition: Stable    Electronically signed by: Paris Song D.O., 2022 5:59 AM

## 2022-12-16 NOTE — ED PROVIDER NOTES
ED Provider Note    CHIEF COMPLAINT  Chief Complaint   Patient presents with    Toe Pain     BIBA for toe pain, EMS states pt was discharged from [facility] and called EMS  Pt states toes are hurting  Denies any specific injury or accident   Pt difficult to follow, mumbling, pacing the room with pants around ankles        HPI  Thomas James Dancer V is a 22 y.o. male who presents to emergency department by EMS.  Patient reportedly recently discharged from [facility] and then called EMS.  Allegedly stating that he had some toe discomfort.    Unable to get history from patient given his current clinical condition.    Chart review: Repeat documentation of methamphetamine use and patient responding to internal stimuli.    REVIEW OF SYSTEMS  See HPI for further details. All other systems are negative.     PAST MEDICAL HISTORY       SOCIAL HISTORY  Social History     Tobacco Use    Smoking status: Every Day    Smokeless tobacco: Never   Vaping Use    Vaping Use: Never used   Substance and Sexual Activity    Alcohol use: Yes    Drug use: Yes     Comment: meth    Sexual activity: Not on file       SURGICAL HISTORY  patient denies any surgical history    CURRENT MEDICATIONS  Home Medications       Reviewed by Reji Starr R.N. (Registered Nurse) on 12/15/22 at 2358  Med List Status: Not Addressed     Medication Last Dose Status        Patient Reji Taking any Medications                           ALLERGIES  Allergies   Allergen Reactions    Bee Anaphylaxis and Swelling     Patient also reports he swells where he gets stung        PHYSICAL EXAM  VITAL SIGNS: /74   Pulse 64   Temp 36.7 °C (98 °F) (Temporal)   Resp 14   Ht 1.829 m (6')   Wt 68 kg (150 lb)   SpO2 96%   BMI 20.34 kg/m²  @BLAIRE[[id number]::@  Pulse ox interpretation: I interpret this pulse ox as normal.  Constitutional: Alert in no apparent distress.  HENT: Normocephalic, Atraumatic, Bilateral external ears normal. Nose normal.   Eyes: Pupils are equal and  reactive.   Heart: Regular rate and rythm, no murmurs.    Lungs: Clear to auscultation bilaterally.  Skin: Warm, Dry, No erythema, No rash.   Neurologic: Alert, Grossly non-focal.   Psychiatric: Mumbling, appears intoxicated, denies SI HI.  States that he feels crazy      Results for orders placed or performed during the hospital encounter of 12/16/22   POC BREATHALIZER   Result Value Ref Range    POC Breathalizer 0.000 0.00 - 0.01 Percent       COURSE & MEDICAL DECISION MAKING  Pertinent Labs & Imaging studies reviewed. (See chart for details)  ED observation  Start time 0200:   Family history: Unobtainable secondary to patient's current clinical condition    22-year-old male presented emerged part with the above presentation.  I have a stress patient the patient does have recurrent psychiatric complication which may be secondary to methamphetamine use which was previously documented on chart review.  At this point there is no additional guidance or information to be obtained from the patient given his current clinical status.  Initial attempt from behavioral health services evaluation was also unsuccessful due to the patient's current mental status.  Will continue to observe the patient for hopeful metabolization and reevaluation from a psychiatric care standpoint.    FINAL IMPRESSION  1. Altered mental status, unspecified altered mental status type               Electronically signed by: Raffaele Cameron M.D., 12/16/2022 1:37 AM

## 2022-12-16 NOTE — DISCHARGE PLANNING
Alert Team:    Contacted [facility] to see if pt has been staying there. [facility] staff member, Janiya, states pt is close enough to suspension period ending and is able to go back there to discuss getting a bed as long as he metabolizes substances used. Pt sleeping at this time and will be re-evaluated later this morning.
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
## 2022-12-16 NOTE — ED NOTES
Patient educated on discharge instructions, follow up appointments, prescriptions, and home care. Patient verbalized understanding. Patient ambulated well to [facility].

## 2022-12-16 NOTE — ED TRIAGE NOTES
Chief Complaint   Patient presents with    Toe Pain     BIBA for toe pain, EMS states pt was discharged from [facility] and called EMS  Pt states toes are hurting  Denies any specific injury or accident   Pt difficult to follow, mumbling, pacing the room with pants around ankles      /74   Pulse 64   Temp 36.7 °C (98 °F) (Temporal)   Resp 14   Ht 1.829 m (6')   Wt 68 kg (150 lb)   SpO2 96%   BMI 20.34 kg/m²     Pt made aware of triage process, placed back into lobby, educated pt to tell staff of any worsening of symptoms

## 2022-12-24 ENCOUNTER — APPOINTMENT (OUTPATIENT)
Dept: RADIOLOGY | Facility: MEDICAL CENTER | Age: 22
End: 2022-12-24
Attending: EMERGENCY MEDICINE
Payer: COMMERCIAL

## 2022-12-24 ENCOUNTER — HOSPITAL ENCOUNTER (EMERGENCY)
Facility: MEDICAL CENTER | Age: 22
End: 2022-12-24
Attending: EMERGENCY MEDICINE
Payer: COMMERCIAL

## 2022-12-24 VITALS
TEMPERATURE: 98 F | HEART RATE: 109 BPM | OXYGEN SATURATION: 97 % | DIASTOLIC BLOOD PRESSURE: 81 MMHG | WEIGHT: 149.91 LBS | RESPIRATION RATE: 20 BRPM | SYSTOLIC BLOOD PRESSURE: 132 MMHG | BODY MASS INDEX: 20.31 KG/M2 | HEIGHT: 72 IN

## 2022-12-24 DIAGNOSIS — F15.11 HISTORY OF METHAMPHETAMINE ABUSE (HCC): ICD-10-CM

## 2022-12-24 DIAGNOSIS — R00.0 SINUS TACHYCARDIA: ICD-10-CM

## 2022-12-24 DIAGNOSIS — R41.82 ALTERED MENTAL STATUS, UNSPECIFIED ALTERED MENTAL STATUS TYPE: ICD-10-CM

## 2022-12-24 DIAGNOSIS — E86.0 DEHYDRATION: ICD-10-CM

## 2022-12-24 LAB
ALBUMIN SERPL BCP-MCNC: 4.1 G/DL (ref 3.2–4.9)
ALBUMIN/GLOB SERPL: 1.2 G/DL
ALP SERPL-CCNC: 60 U/L (ref 30–99)
ALT SERPL-CCNC: 17 U/L (ref 2–50)
AMPHET UR QL SCN: POSITIVE
ANION GAP SERPL CALC-SCNC: 17 MMOL/L (ref 7–16)
APPEARANCE UR: ABNORMAL
AST SERPL-CCNC: 31 U/L (ref 12–45)
BACTERIA #/AREA URNS HPF: NEGATIVE /HPF
BARBITURATES UR QL SCN: NEGATIVE
BASOPHILS # BLD AUTO: 0.2 % (ref 0–1.8)
BASOPHILS # BLD: 0.03 K/UL (ref 0–0.12)
BENZODIAZ UR QL SCN: NEGATIVE
BILIRUB SERPL-MCNC: 0.5 MG/DL (ref 0.1–1.5)
BILIRUB UR QL STRIP.AUTO: NEGATIVE
BUN SERPL-MCNC: 15 MG/DL (ref 8–22)
BZE UR QL SCN: NEGATIVE
CALCIUM ALBUM COR SERPL-MCNC: 9.6 MG/DL (ref 8.5–10.5)
CALCIUM SERPL-MCNC: 9.7 MG/DL (ref 8.5–10.5)
CANNABINOIDS UR QL SCN: NEGATIVE
CHLORIDE SERPL-SCNC: 104 MMOL/L (ref 96–112)
CO2 SERPL-SCNC: 20 MMOL/L (ref 20–33)
COLOR UR: ABNORMAL
CREAT SERPL-MCNC: 1.04 MG/DL (ref 0.5–1.4)
EKG IMPRESSION: NORMAL
EOSINOPHIL # BLD AUTO: 0 K/UL (ref 0–0.51)
EOSINOPHIL NFR BLD: 0 % (ref 0–6.9)
EPI CELLS #/AREA URNS HPF: ABNORMAL /HPF
ERYTHROCYTE [DISTWIDTH] IN BLOOD BY AUTOMATED COUNT: 37.7 FL (ref 35.9–50)
ETHANOL BLD-MCNC: 15.8 MG/DL
GFR SERPLBLD CREATININE-BSD FMLA CKD-EPI: 104 ML/MIN/1.73 M 2
GLOBULIN SER CALC-MCNC: 3.5 G/DL (ref 1.9–3.5)
GLUCOSE SERPL-MCNC: 85 MG/DL (ref 65–99)
GLUCOSE UR STRIP.AUTO-MCNC: NEGATIVE MG/DL
HCT VFR BLD AUTO: 37.6 % (ref 42–52)
HGB BLD-MCNC: 13.5 G/DL (ref 14–18)
HYALINE CASTS #/AREA URNS LPF: ABNORMAL /LPF
IMM GRANULOCYTES # BLD AUTO: 0.18 K/UL (ref 0–0.11)
IMM GRANULOCYTES NFR BLD AUTO: 1 % (ref 0–0.9)
KETONES UR STRIP.AUTO-MCNC: 40 MG/DL
LEUKOCYTE ESTERASE UR QL STRIP.AUTO: NEGATIVE
LYMPHOCYTES # BLD AUTO: 1.64 K/UL (ref 1–4.8)
LYMPHOCYTES NFR BLD: 9.3 % (ref 22–41)
MCH RBC QN AUTO: 31.1 PG (ref 27–33)
MCHC RBC AUTO-ENTMCNC: 35.9 G/DL (ref 33.7–35.3)
MCV RBC AUTO: 86.6 FL (ref 81.4–97.8)
METHADONE UR QL SCN: NEGATIVE
MICRO URNS: ABNORMAL
MONOCYTES # BLD AUTO: 1.36 K/UL (ref 0–0.85)
MONOCYTES NFR BLD AUTO: 7.7 % (ref 0–13.4)
NEUTROPHILS # BLD AUTO: 14.44 K/UL (ref 1.82–7.42)
NEUTROPHILS NFR BLD: 81.8 % (ref 44–72)
NITRITE UR QL STRIP.AUTO: NEGATIVE
NRBC # BLD AUTO: 0 K/UL
NRBC BLD-RTO: 0 /100 WBC
OPIATES UR QL SCN: NEGATIVE
OXYCODONE UR QL SCN: NEGATIVE
PCP UR QL SCN: NEGATIVE
PH UR STRIP.AUTO: 5 [PH] (ref 5–8)
PLATELET # BLD AUTO: 426 K/UL (ref 164–446)
PMV BLD AUTO: 8.9 FL (ref 9–12.9)
POTASSIUM SERPL-SCNC: 3.4 MMOL/L (ref 3.6–5.5)
PROPOXYPH UR QL SCN: NEGATIVE
PROT SERPL-MCNC: 7.6 G/DL (ref 6–8.2)
PROT UR QL STRIP: 30 MG/DL
RBC # BLD AUTO: 4.34 M/UL (ref 4.7–6.1)
RBC # URNS HPF: ABNORMAL /HPF
RBC UR QL AUTO: NEGATIVE
SODIUM SERPL-SCNC: 141 MMOL/L (ref 135–145)
SP GR UR STRIP.AUTO: 1.03
SPERM #/AREA URNS HPF: ABNORMAL /HPF
UROBILINOGEN UR STRIP.AUTO-MCNC: 0.2 MG/DL
WBC # BLD AUTO: 17.7 K/UL (ref 4.8–10.8)
WBC #/AREA URNS HPF: ABNORMAL /HPF

## 2022-12-24 PROCEDURE — 82077 ASSAY SPEC XCP UR&BREATH IA: CPT

## 2022-12-24 PROCEDURE — 81001 URINALYSIS AUTO W/SCOPE: CPT

## 2022-12-24 PROCEDURE — 700105 HCHG RX REV CODE 258: Performed by: EMERGENCY MEDICINE

## 2022-12-24 PROCEDURE — 99285 EMERGENCY DEPT VISIT HI MDM: CPT

## 2022-12-24 PROCEDURE — 700111 HCHG RX REV CODE 636 W/ 250 OVERRIDE (IP): Performed by: EMERGENCY MEDICINE

## 2022-12-24 PROCEDURE — 85025 COMPLETE CBC W/AUTO DIFF WBC: CPT

## 2022-12-24 PROCEDURE — 80053 COMPREHEN METABOLIC PANEL: CPT

## 2022-12-24 PROCEDURE — 71045 X-RAY EXAM CHEST 1 VIEW: CPT

## 2022-12-24 PROCEDURE — 36415 COLL VENOUS BLD VENIPUNCTURE: CPT

## 2022-12-24 PROCEDURE — 96372 THER/PROPH/DIAG INJ SC/IM: CPT

## 2022-12-24 PROCEDURE — 80307 DRUG TEST PRSMV CHEM ANLYZR: CPT

## 2022-12-24 PROCEDURE — 93005 ELECTROCARDIOGRAM TRACING: CPT | Performed by: EMERGENCY MEDICINE

## 2022-12-24 RX ORDER — DIPHENHYDRAMINE HYDROCHLORIDE 50 MG/ML
50 INJECTION INTRAMUSCULAR; INTRAVENOUS ONCE
Status: COMPLETED | OUTPATIENT
Start: 2022-12-24 | End: 2022-12-24

## 2022-12-24 RX ORDER — SODIUM CHLORIDE 9 MG/ML
1000 INJECTION, SOLUTION INTRAVENOUS ONCE
Status: COMPLETED | OUTPATIENT
Start: 2022-12-24 | End: 2022-12-24

## 2022-12-24 RX ORDER — HALOPERIDOL 5 MG/ML
5 INJECTION INTRAMUSCULAR ONCE
Status: COMPLETED | OUTPATIENT
Start: 2022-12-24 | End: 2022-12-24

## 2022-12-24 RX ORDER — LORAZEPAM 2 MG/ML
2 INJECTION INTRAMUSCULAR ONCE
Status: COMPLETED | OUTPATIENT
Start: 2022-12-24 | End: 2022-12-24

## 2022-12-24 RX ADMIN — DIPHENHYDRAMINE HYDROCHLORIDE 50 MG: 50 INJECTION INTRAMUSCULAR; INTRAVENOUS at 16:38

## 2022-12-24 RX ADMIN — SODIUM CHLORIDE 1000 ML: 9 INJECTION, SOLUTION INTRAVENOUS at 20:40

## 2022-12-24 RX ADMIN — LORAZEPAM 2 MG: 2 INJECTION INTRAMUSCULAR; INTRAVENOUS at 15:26

## 2022-12-24 RX ADMIN — HALOPERIDOL LACTATE 5 MG: 5 INJECTION, SOLUTION INTRAMUSCULAR at 16:38

## 2022-12-24 RX ADMIN — SODIUM CHLORIDE 1000 ML: 9 INJECTION, SOLUTION INTRAVENOUS at 17:00

## 2022-12-24 ASSESSMENT — FIBROSIS 4 INDEX: FIB4 SCORE: 1.22

## 2022-12-24 NOTE — ED TRIAGE NOTES
Chief Complaint   Patient presents with    Altered Mental Status     BIB EMS for altered mental status and agitation. Pt impulsive, mumbling to self, not following commands, spastic movements, diaphoretic, tachycardic.     Pt found to be febrile of 100.4 by EMS. Pt 97.1 temporal in ED.

## 2022-12-24 NOTE — ED NOTES
EMS called by Pt due to Pt found running in the street. Pt not responding to EMS or RN commands. Pt not answering questions. Pt mumbling incoherently to self. Pt extremely diaphoretic. Unable to attach cardiac leads to Pt due to sweatiness and Pt impulsively pulling at lines.

## 2022-12-24 NOTE — ED PROVIDER NOTES
"ED Provider Note    CHIEF COMPLAINT  Chief Complaint   Patient presents with    Altered Mental Status     BIB EMS for altered mental status and agitation. Pt impulsive, mumbling to self, not following commands, spastic movements, diaphoretic, tachycardic.       HPI  Thomas James Dancer V is a 22 y.o. male who presents tachycardic, agitated, mumbling to self and picking at himself.  Patient was running about the street and confused therefore brought to the emergency department after call from bystanders to 911.  Review of chart shows history of methamphetamine abuse, similar behavior in the past.  Patient does not verbalize pain or suicidal ideation, difficult to get him to answer any questions.  No reported or obvious trauma.  No complaints of suicidal or homicidal ideation verbalized.  Patient unwilling to give history    REVIEW OF SYSTEMS  Review of systems impossible secondary to the patient's condition    PAST MEDICAL HISTORY  No past medical history on file.    FAMILY HISTORY  No family history on file.    SOCIAL HISTORY  Social History     Socioeconomic History    Marital status: Single   Tobacco Use    Smoking status: Every Day    Smokeless tobacco: Never   Vaping Use    Vaping Use: Never used   Substance and Sexual Activity    Alcohol use: Yes    Drug use: Yes     Comment: meth   Social History Narrative    ** Merged History Encounter **         ** Merged History Encounter **            SURGICAL HISTORY  No past surgical history on file.    CURRENT MEDICATIONS  No current facility-administered medications on file prior to encounter.     No current outpatient medications on file prior to encounter.       ALLERGIES  Allergies   Allergen Reactions    Bee Anaphylaxis and Swelling     Patient also reports he swells where he gets stung        PHYSICAL EXAM  VITAL SIGNS: /58   Pulse (!) 171   Temp 36.7 °C (98 °F) (Temporal)   Resp (!) 28   Ht 1.829 m (6' 0.01\")   Wt 68 kg (149 lb 14.6 oz)   SpO2 97%   " BMI 20.33 kg/m²   Constitutional: Agitated.   HENT: No facial swelling or trauma  Eyes: Conjunctiva normal, No discharge.   Cardiovascular: Sinus rhythm, tachycardic.  Narrow complex tachycardia regular rhythm on the monitor  Pulmonary: Normal breath sounds  Abdomen: Soft, No tenderness.    Skin: Warm, moist, No erythema, No rash.   Neurologic: Mumbled speech.  Difficulty getting the patient to follow commands.  He moves all extremities, strength intact.  Patient does react to light touch in all 4 extremities  Psychiatric: Appears agitated.  Unwilling to answer questions regarding suicidal or homicidal ideation, hallucinations.    Laboratory evaluation is pending    Results for orders placed or performed during the hospital encounter of 22   EKG   Result Value Ref Range    Report       [facility] Emergency Dept.    Test Date:  2022  Pt Name:    THOMAS DANCER                Department: ER  MRN:        [id number]                      Room:       [facility]  Gender:     Male                         Technician: [id number]  :        2000                   Requested By:GEOVANNA MCKEE  Order #:    [id number]                    Reading MD: GEOVANNA MCKEE MD    Measurements  Intervals                                Axis  Rate:       143                          P:          80  IL:         125                          QRS:        91  QRSD:       83                           T:          56  QT:         411  QTc:        634    Interpretive Statements  Sinus tachycardia  Borderline right axis deviation  Minimal ST depression, inferior leads  Prolonged QT interval  Compared to ECG 2022 01:13:29  Prolonged QT interval now present  Sinus rhythm no longer present  ST (T wave) deviation still present  Electronically Signed On 2022 17:16:25 PST lanny y GEOVANNA MCKEE MD             COURSE & MEDICAL DECISION MAKING  Pertinent Labs & Imaging studies reviewed. (See chart for details)  Patient  presents tachycardic, agitated, suspect recent methamphetamine use.  Patient is afebrile.  Unable to initially start IV and draw blood secondary to patient's agitation and pulling things off.  After discussion with nursing staff, patient given 2 mg of IV Ativan which was insufficient to sedate him.  He has then been treated with Haldol and Benadryl.  Heart rate has improved to 140, it is sinus rhythm on the monitor.  Plan for lab work, ongoing observation in the emergency department.  Suspect methamphetamine toxicity, patient may require hours to improve therefore will be admitted to ER observation status.    FINAL IMPRESSION  1. Altered mental status, unspecified altered mental status type        2. History of methamphetamine abuse (HCC)        3. Sinus tachycardia            Patient is admitted to ER observation status at 5:30 PM on December 24, 2022 for ongoing evaluation of tachycardia and altered mental status in the setting of suspected methamphetamine usage.    Electronically signed by: Parminder Boyle M.D., 12/24/2022 3:18 PM

## 2022-12-25 NOTE — ED PROVIDER NOTES
"ED Provider Note    Patient:Thomas James Dancer V  Patient : 2000  Patient MRN: [id number]  Patient PCP: Pcp Pt States None    Admit Date: 2022  Discharge Date and Time: 22 8:30 PM  Discharge Diagnosis: Methamphetamine abuse, dehydration  Discharge Attending: Slim Roberts M.D.  Discharge Service: ED Observation    ED Course  Parminder is a 22 y.o. male who was evaluated at [facility] for evaluation of agitated behavior.  History of methamphetamine abuse, presentation is consistent with significant methamphetamine intoxication.  He is very tachycardic initially but heart rate improves markedly after lorazepam and with IV fluids.  Does have leukocytosis but no fever nor evidence of infectious process, clear chest x-ray and normal urinalysis.  This is not consistent with sepsis.  Patient observed here in the ED until symptoms resolved.  On my reassessment he is only mildly anxious but otherwise well in appearance.  Heart rate and much improved, between low 100s and 110 on the monitor.  Leukocytosis noted likely secondary to dehydration demargination, there is no evidence of sepsis.    Discharge Exam:  /53   Pulse (!) 109   Temp 36.7 °C (98 °F) (Temporal)   Resp 20   Ht 1.829 m (6' 0.01\")   Wt 68 kg (149 lb 14.6 oz)   SpO2 95%   BMI 20.33 kg/m² .    Constitutional: Awake and alert. Nontoxic  HENT:  Grossly normal  Eyes: Grossly normal  Neck: Normal range of motion  Cardiovascular: Mildly tachycardic   Thorax & Lungs: No respiratory distress  Abdomen: Nondistended  Skin:  No pathologic rash.   Extremities: Well perfused  Psychiatric: Affect mildly anxious but otherwise cooperative and appropriate.    Labs  Results for orders placed or performed during the hospital encounter of 22   CBC WITH DIFFERENTIAL   Result Value Ref Range    WBC 17.7 (H) 4.8 - 10.8 K/uL    RBC 4.34 (L) 4.70 - 6.10 M/uL    Hemoglobin 13.5 (L) 14.0 - 18.0 g/dL    Hematocrit 37.6 (L) 42.0 - " 52.0 %    MCV 86.6 81.4 - 97.8 fL    MCH 31.1 27.0 - 33.0 pg    MCHC 35.9 (H) 33.7 - 35.3 g/dL    RDW 37.7 35.9 - 50.0 fL    Platelet Count 426 164 - 446 K/uL    MPV 8.9 (L) 9.0 - 12.9 fL    Neutrophils-Polys 81.80 (H) 44.00 - 72.00 %    Lymphocytes 9.30 (L) 22.00 - 41.00 %    Monocytes 7.70 0.00 - 13.40 %    Eosinophils 0.00 0.00 - 6.90 %    Basophils 0.20 0.00 - 1.80 %    Immature Granulocytes 1.00 (H) 0.00 - 0.90 %    Nucleated RBC 0.00 /100 WBC    Neutrophils (Absolute) 14.44 (H) 1.82 - 7.42 K/uL    Lymphs (Absolute) 1.64 1.00 - 4.80 K/uL    Monos (Absolute) 1.36 (H) 0.00 - 0.85 K/uL    Eos (Absolute) 0.00 0.00 - 0.51 K/uL    Baso (Absolute) 0.03 0.00 - 0.12 K/uL    Immature Granulocytes (abs) 0.18 (H) 0.00 - 0.11 K/uL    NRBC (Absolute) 0.00 K/uL   COMP METABOLIC PANEL   Result Value Ref Range    Sodium 141 135 - 145 mmol/L    Potassium 3.4 (L) 3.6 - 5.5 mmol/L    Chloride 104 96 - 112 mmol/L    Co2 20 20 - 33 mmol/L    Anion Gap 17.0 (H) 7.0 - 16.0    Glucose 85 65 - 99 mg/dL    Bun 15 8 - 22 mg/dL    Creatinine 1.04 0.50 - 1.40 mg/dL    Calcium 9.7 8.5 - 10.5 mg/dL    AST(SGOT) 31 12 - 45 U/L    ALT(SGPT) 17 2 - 50 U/L    Alkaline Phosphatase 60 30 - 99 U/L    Total Bilirubin 0.5 0.1 - 1.5 mg/dL    Albumin 4.1 3.2 - 4.9 g/dL    Total Protein 7.6 6.0 - 8.2 g/dL    Globulin 3.5 1.9 - 3.5 g/dL    A-G Ratio 1.2 g/dL   DIAGNOSTIC ALCOHOL   Result Value Ref Range    Diagnostic Alcohol 15.8 (H) <10.1 mg/dL   ESTIMATED GFR   Result Value Ref Range    GFR (CKD-EPI) 104 >60 mL/min/1.73 m 2   CORRECTED CALCIUM   Result Value Ref Range    Correct Calcium 9.6 8.5 - 10.5 mg/dL   URINALYSIS    Specimen: Urine   Result Value Ref Range    Color DK Yellow     Character Cloudy (A)     Specific Gravity 1.028 <1.035    Ph 5.0 5.0 - 8.0    Glucose Negative Negative mg/dL    Ketones 40 (A) Negative mg/dL    Protein 30 (A) Negative mg/dL    Bilirubin Negative Negative    Urobilinogen, Urine 0.2 Negative    Nitrite Negative Negative     Leukocyte Esterase Negative Negative    Occult Blood Negative Negative    Micro Urine Req Microscopic    URINE DRUG SCREEN   Result Value Ref Range    Amphetamines Urine Positive (A) Negative    Barbiturates Negative Negative    Benzodiazepines Negative Negative    Cocaine Metabolite Negative Negative    Methadone Negative Negative    Opiates Negative Negative    Oxycodone Negative Negative    Phencyclidine -Pcp Negative Negative    Propoxyphene Negative Negative    Cannabinoid Metab Negative Negative   URINE MICROSCOPIC (W/UA)   Result Value Ref Range    WBC 2-5 (A) /hpf    RBC 0-2 (A) /hpf    Bacteria Negative None /hpf    Epithelial Cells Few /hpf    Hyaline Cast 11-20 (A) /lpf    Sperm Moderate /hpf   EKG   Result Value Ref Range    Report       [facility] Emergency Dept.    Test Date:  2022  Pt Name:    THOMAS DANCER                Department: ER  MRN:        [id number]                      Room:       [facility]  Gender:     Male                         Technician: [id number]  :        2000                   Requested By:GEOVANNA MCKEE  Order #:    [id number]                    Reading MD: GEOVANNA MCKEE MD    Measurements  Intervals                                Axis  Rate:       143                          P:          80  FL:         125                          QRS:        91  QRSD:       83                           T:          56  QT:         411  QTc:        634    Interpretive Statements  Sinus tachycardia  Borderline right axis deviation  Minimal ST depression, inferior leads  Prolonged QT interval  Compared to ECG 2022 01:13:29  Prolonged QT interval now present  Sinus rhythm no longer present  ST (T wave) deviation still present  Electronically Signed On 2022 17:16:25 PST b y GEOVANNA MCKEE MD         Radiology  DX-CHEST-PORTABLE (1 VIEW)   Final Result      No acute cardiopulmonary disease evident.            Medications:   New Prescriptions    No  medications on file     IMPRESSION:  1. Altered mental status, unspecified altered mental status type        2. History of methamphetamine abuse (HCC)        3. Sinus tachycardia  Ref[facility]      4. Dehydration               The patient will return for new or worsening symptoms and is stable at the time of discharge.    Patient has had high blood pressure while in the emergency department, felt likely secondary to medical condition. Counseled patient to monitor blood pressure at home and follow up with primary care physician.      DISPOSITION:  Patient will be discharged home in stable condition.    FOLLOW UP:  Jim Montgomery M.D.  [address] [place] [address]  [phone]    Schedule an appointment as soon as possible for a visit         OUTPATIENT MEDICATIONS:  New Prescriptions    No medications on file          Discharge Condition: Stable    Electronically signed by: Slim Roberts M.D., 12/24/2022 8:30 PM

## 2022-12-25 NOTE — ED NOTES
Medicated for agitation with Im benadryl and haldol.  RN at bedside for 20 minutes, redirecting pt due to agitation after giving medications.  Pt finally settled, able to get EKG.  Pt remains tachycardic.  Will place IV and draw labs per ERP order.

## 2022-12-25 NOTE — ED NOTES
Report from Stacey LUCAS, assuming care of pt.    Pt resting in gurDenio, respirations even, no needs at this time. Awaiting urine.

## 2022-12-31 ENCOUNTER — HOSPITAL ENCOUNTER (EMERGENCY)
Facility: MEDICAL CENTER | Age: 22
End: 2022-12-31
Attending: EMERGENCY MEDICINE
Payer: COMMERCIAL

## 2022-12-31 VITALS
HEIGHT: 70 IN | WEIGHT: 150 LBS | BODY MASS INDEX: 21.47 KG/M2 | TEMPERATURE: 98 F | RESPIRATION RATE: 18 BRPM | DIASTOLIC BLOOD PRESSURE: 85 MMHG | HEART RATE: 95 BPM | SYSTOLIC BLOOD PRESSURE: 112 MMHG | OXYGEN SATURATION: 97 %

## 2022-12-31 DIAGNOSIS — R45.1 AGITATION: Primary | ICD-10-CM

## 2022-12-31 DIAGNOSIS — F15.10 METHAMPHETAMINE ABUSE (HCC): ICD-10-CM

## 2022-12-31 LAB
EKG IMPRESSION: NORMAL
ETHANOL BLD-MCNC: <10.1 MG/DL

## 2022-12-31 PROCEDURE — 96374 THER/PROPH/DIAG INJ IV PUSH: CPT

## 2022-12-31 PROCEDURE — 700111 HCHG RX REV CODE 636 W/ 250 OVERRIDE (IP): Performed by: EMERGENCY MEDICINE

## 2022-12-31 PROCEDURE — 82077 ASSAY SPEC XCP UR&BREATH IA: CPT

## 2022-12-31 PROCEDURE — 700105 HCHG RX REV CODE 258: Performed by: EMERGENCY MEDICINE

## 2022-12-31 PROCEDURE — 99285 EMERGENCY DEPT VISIT HI MDM: CPT

## 2022-12-31 PROCEDURE — 96372 THER/PROPH/DIAG INJ SC/IM: CPT | Mod: XU

## 2022-12-31 PROCEDURE — 93005 ELECTROCARDIOGRAM TRACING: CPT | Performed by: EMERGENCY MEDICINE

## 2022-12-31 PROCEDURE — 36415 COLL VENOUS BLD VENIPUNCTURE: CPT

## 2022-12-31 RX ORDER — SODIUM CHLORIDE 9 MG/ML
1000 INJECTION, SOLUTION INTRAVENOUS ONCE
Status: COMPLETED | OUTPATIENT
Start: 2022-12-31 | End: 2022-12-31

## 2022-12-31 RX ORDER — HALOPERIDOL 5 MG/ML
5 INJECTION INTRAMUSCULAR ONCE
Status: COMPLETED | OUTPATIENT
Start: 2022-12-31 | End: 2022-12-31

## 2022-12-31 RX ORDER — DIPHENHYDRAMINE HYDROCHLORIDE 50 MG/ML
50 INJECTION INTRAMUSCULAR; INTRAVENOUS ONCE
Status: COMPLETED | OUTPATIENT
Start: 2022-12-31 | End: 2022-12-31

## 2022-12-31 RX ORDER — LORAZEPAM 2 MG/ML
2 INJECTION INTRAMUSCULAR ONCE
Status: COMPLETED | OUTPATIENT
Start: 2022-12-31 | End: 2022-12-31

## 2022-12-31 RX ADMIN — DIPHENHYDRAMINE HYDROCHLORIDE 50 MG: 50 INJECTION INTRAMUSCULAR; INTRAVENOUS at 10:59

## 2022-12-31 RX ADMIN — SODIUM CHLORIDE 1000 ML: 9 INJECTION, SOLUTION INTRAVENOUS at 13:00

## 2022-12-31 RX ADMIN — LORAZEPAM 2 MG: 2 INJECTION INTRAMUSCULAR; INTRAVENOUS at 10:59

## 2022-12-31 RX ADMIN — HALOPERIDOL LACTATE 5 MG: 5 INJECTION, SOLUTION INTRAMUSCULAR at 12:18

## 2022-12-31 ASSESSMENT — FIBROSIS 4 INDEX: FIB4 SCORE: 0.55

## 2022-12-31 NOTE — ED NOTES
Pt medicated per MAR for agitatoin  Pt became physically aggressive and attempted to spit on security officers  Pt placed in four point restraints for safety.

## 2022-12-31 NOTE — ED TRIAGE NOTES
Chief Complaint   Patient presents with    Psych Eval     BIB [facility] from [facility]. Patient has been pacing and refusing to answer questions.  at [facility] placed him on a legal hold. Patient is pacing and will not talk but refused drugs or alcohol use

## 2022-12-31 NOTE — ED NOTES
Pt in room pacing back and forth. Pt to and from restroom multiple times. Pt educated on need for urine. No evidence of learning observed  ERP @ BS

## 2022-12-31 NOTE — ED PROVIDER NOTES
ED Provider Note        CHIEF COMPLAINT  Chief Complaint   Patient presents with    Psych Eval     [facility] from [facility]. Patient has been pacing and refusing to answer questions.  at [facility] placed him on a legal hold. Patient is pacing and will not talk but refused drugs or alcohol use       EXTERNAL RECORDS REVIEWED  Select: Other -patient was most recently seen in this emergency department 12/24/2022 for agitation.  Noted is a history of methamphetamine abuse.  He was tachycardic which improved after Ativan and IV fluids.  He had a leukocytosis but no fever or evidence of infectious process.  Chest x-ray was clear and he had a normal UA.  He was observed until his symptoms resolved.  Upon reevaluation he was noted to be anxious but otherwise well.  Heart rate in the low 100s and 110 on the monitor.  He was ultimately discharged to follow-up as an outpatient.    HPI  LIMITATION TO HISTORY   Select: Intoxication  OUTSIDE HISTORIAN(S):  Select: Nursing staff at bedside    Thomas James Dancer V is a 22 y.o. undomiciled male who presents with agitation.  He has a known history of amphetamine use.  He was found to be agitated at the [facility] where a  placed him on a legal hold.  EMS was contacted and they brought him to the ER.  He was unable to provide any information due to altered mental status presumed to be due to amphetamine use.    REVIEW OF SYSTEMS  See HPI for further details.  Altered mental status.  All other systems are negative.     PAST MEDICAL HISTORY       SURGICAL HISTORY  patient denies any surgical history    FAMILY HISTORY  History reviewed. No pertinent family history.    SOCIAL HISTORY  Social History     Tobacco Use    Smoking status: Every Day    Smokeless tobacco: Never   Vaping Use    Vaping Use: Never used   Substance and Sexual Activity    Alcohol use: Yes    Drug use: Yes     Comment: meth    Sexual activity: Not on file       CURRENT  "MEDICATIONS  Home Medications    **Home medications have not yet been reviewed for this encounter**         ALLERGIES  Allergies   Allergen Reactions    Bee Anaphylaxis and Swelling     Patient also reports he swells where he gets stung        PHYSICAL EXAM  VITAL SIGNS: /65   Pulse 92   Temp 36.9 °C (98.4 °F)   Resp (!) 23   Ht 1.778 m (5' 10\")   Wt 68 kg (150 lb)   SpO2 97%   BMI 21.52 kg/m²    Physical Exam  Constitutional:       Comments: Alert.  Pacing in room.  Anxious.  Appears intoxicated.  Responding to internal stimuli.   HENT:      Head: Normocephalic and atraumatic.      Right Ear: External ear normal.      Left Ear: External ear normal.      Nose: Nose normal.      Mouth/Throat:      Mouth: Mucous membranes are moist.   Eyes:      Extraocular Movements: Extraocular movements intact.      Conjunctiva/sclera: Conjunctivae normal.      Pupils: Pupils are equal, round, and reactive to light.   Cardiovascular:      Rate and Rhythm: Regular rhythm. Tachycardia present.      Pulses: Normal pulses.      Heart sounds: Normal heart sounds. No murmur heard.    No friction rub. No gallop.   Pulmonary:      Effort: Pulmonary effort is normal. No respiratory distress.      Breath sounds: Normal breath sounds. No wheezing, rhonchi or rales.   Abdominal:      General: Abdomen is flat. Bowel sounds are normal.      Palpations: Abdomen is soft.      Tenderness: There is no abdominal tenderness.   Musculoskeletal:         General: Normal range of motion.      Cervical back: Normal range of motion and neck supple.   Skin:     General: Skin is warm and dry.      Findings: No erythema or rash.   Neurological:      Comments: Ambulating without difficulty.   Psychiatric:      Comments: Agitated.  Pacing.  Responding to internal stimuli.     DIAGNOSTIC STUDIES / PROCEDURES    LABS  Results for orders placed or performed during the hospital encounter of 12/31/22   DIAGNOSTIC ALCOHOL   Result Value Ref Range    " Diagnostic Alcohol <10.1 <10.1 mg/dL   EKG   Result Value Ref Range    Report       [facility] Emergency Dept.    Test Date:  2022  Pt Name:    THOMAS DANCER                Department: ER  MRN:        [id number]                      Room:        36  Gender:     Male                         Technician: [id number]  :        2000                   Requested By:JUAN JOSEPH  Order #:    [id number]                    Reading MD: Juan Joseph MD    Measurements  Intervals                                Axis  Rate:       93                           P:          62  MA:         144                          QRS:        84  QRSD:       88                           T:          59  QT:         387  QTc:        482    Interpretive Statements  Sinus rhythm  ST elev, probable normal early repol pattern  Borderline prolonged QT interval  Compared to ECG 2022 17:08:56  Sinus tachycardia no longer present  ST (T wave) deviation still present  Electronically Signed On 2022 16:28:12 PST by Juan Joseph MD       RADIOLOGY  I have independently interpreted the diagnostic imaging associated with this visit and am waiting the final reading from the radiologist.   No orders to display     COURSE & MEDICAL DECISION MAKING  Pertinent Labs & Imaging studies reviewed. (See chart for details)    ED Observation Status? Yes; Patient placed in observation status at 1:30 PM, 2022 pending clinical sobriety at which time he will be reevaluated.  Patient was reevaluated at 1700.  He is sleeping and will require continued observation.  Patient care will be transferred to the oncoming physician, Dr. Curry, pending final reevaluation.  Heart rate has improved to normal with IV fluids.    INITIAL ASSESSMENT AND PLAN  This is a 22-year-old gentleman, well-known to this and other local facilities who is here with what appears to me methamphetamine intoxication.  He is tachycardic, responding to internal  stimuli, anxious and agitated, pacing around the room.  He has no obvious external trauma, is ambulating with a stable gait, and has no focal neurologic abnormalities.  Imaging was deferred.  He is afebrile and beyond his tachycardia he has normal vital signs.  I have a low suspicion for infectious etiology.  I am unable to verbally de-escalate him and because the patient does require medical management at this time he was placed both in violent restraints and given chemical restraints as well.  As soon as the patient was calmer an IV was established and he was given IV fluids.  Plan to allow the patient to metabolize and reevaluate when he is more calm and able to interact with the provider.    HYDRATION: Based on the patient's presentation of Tachycardia the patient was given IV fluids. IV Hydration was used because oral hydration was not adequate alone. Upon recheck following hydration, the patient was improved.    Escalation of care considered, and ultimately not performed: blood analysis and diagnostic imaging.     Barriers to care at this time, including but not limited to: Select: Patient lacks financial resources.     Diagnostic tests and prescription drugs considered including, but not limited to: EKG, labs, IV fluids.    FINAL PROBLEM LIST AND PLAN    In addition to the chief complaint, the following problems were addressed: Substance abuse    I have discussed management of the patient with the following physicians and EDITH's: None    Discussion of management with other QHP or appropriate source(s): Select: None     FINAL IMPRESSION  1. Agitation      Electronically signed by: Jesse Nelson M.D., 12/31/2022 10:52 AM

## 2023-01-01 NOTE — ED PROVIDER NOTES
"Patient:Thomas James Dancer V  Patient : 2000  Patient MRN: [id number]  Patient PCP: Pcp Pt States None    Admit Date: 2022  Discharge Date and Time: 22 7:55 PM  Discharge Diagnosis: Agitation, methamphetamine abuse  Discharge Attending: Miguel Angel Curry  Discharge Service: ED Observation    ED Course  Parminder is a 22 y.o. male who was evaluated at [facility] seen by prior provider.  Patient required sedation for acute agitation likely secondary methamphetamine abuse which is a history of.  Received a B-52.  Has been sleeping throughout prior physician shift.  Plan is to place patient on ED observation for reevaluation.  Patient signed out to me.  On my reevaluation patient is alert, oriented, has no suicidal homicidal ideation, has decision-making capacity and is asking for discharge home.  Given resources.  Ambulating without difficulty and does not appear acutely intoxicated in any way.  Patient appropriate for discharge with outpatient follow-up and strict return precautions.    Discharge Exam:  /85   Pulse 95   Temp 36.7 °C (98 °F) (Temporal)   Resp 18   Ht 1.778 m (5' 10\")   Wt 68 kg (150 lb)   SpO2 97%   BMI 21.52 kg/m² .    Constitutional: Awake and alert. Nontoxic  HENT:  Grossly normal  Eyes: Grossly normal  Neck: Normal range of motion  Cardiovascular: Normal heart rate   Thorax & Lungs: No respiratory distress  Abdomen: Nontender  Skin:  No pathologic rash.   Extremities: Well perfused  Psychiatric: Affect normal    Labs  Results for orders placed or performed during the hospital encounter of 22   DIAGNOSTIC ALCOHOL   Result Value Ref Range    Diagnostic Alcohol <10.1 <10.1 mg/dL   EKG   Result Value Ref Range    Report       [facility] Emergency Dept.    Test Date:  2022  Pt Name:    THOMAS DANCER                Department: ER  MRN:        [id number]                      Room:       [facility]  Gender:     Male                         Technician: " [id number]  :        2000                   Requested By:JUAN JOSEPH  Order #:    [id number]                    Reading MD: Juan Joseph MD    Measurements  Intervals                                Axis  Rate:       93                           P:          62  AZ:         144                          QRS:        84  QRSD:       88                           T:          59  QT:         387  QTc:        482    Interpretive Statements  Sinus rhythm  ST elev, probable normal early repol pattern  Borderline prolonged QT interval  Compared to ECG 2022 17:08:56  Sinus tachycardia no longer present  ST (T wave) deviation still present  Electronically Signed On 2022 16:28:12 PST by Juan Joseph MD         Radiology  No orders to display         Medications:   New Prescriptions    No medications on file       Discharge Condition: Stable    Electronically signed by: Miguel Angel Curry, 2022 7:55 PM

## 2023-01-01 NOTE — ED NOTES
Unable to address med rec. Patient unable to participate in interview. Patient's father ([phone]) unable to verify patient's allergies or any medications that patient may be using. Pharmacies on file were [facility] on [facility] ([phone]) and [facility] on [facility] ([phone]); [facility] unable to locate a profile for [facility] and [facility].

## 2023-01-01 NOTE — ED NOTES
Pt ambulatory out of the ED. Cab arrived - pt got in cab, visualized by ED Tech. Stable. Given extra shirt/sweater.     Patient discharged home per ERP.  Discharge teaching and education discussed with patient. POC discussed.   Patient verbalized understanding of discharge teaching and education. No other questions at this time.     PIV removed.     VSS. Patient alert and oriented.  Able to ambulate off unit safely with steady gait.

## 2023-01-01 NOTE — ED NOTES
Breaking primary RN:     Pt resting in bed with eyes closed, connected to monitor. Will continue to monitor.

## 2023-01-01 NOTE — DISCHARGE INSTRUCTIONS
Please follow-up with your outpatient team for further evaluation treatment.  Come back if you have worsening symptoms.  Thank for coming in today.

## 2023-01-19 ENCOUNTER — TELEPHONE (OUTPATIENT)
Dept: HEALTH INFORMATION MANAGEMENT | Facility: OTHER | Age: 23
End: 2023-01-19

## 2023-01-19 NOTE — TELEPHONE ENCOUNTER
OUTCOME: [facility].     PLEASE TRANSFER TO [facility] -2025 WHEN PT RETURNS CALL.    ATTEMPT # 1.

## 2023-03-20 NOTE — ASSESSMENT & PLAN NOTE
This is Sepsis Present on admission  SIRS criteria identified on my evaluation include: Tachycardia, with heart rate greater than 90 BPM and Leukocytosis, with WBC greater than 12,000  Source is left lower extremity cellulitis  Sepsis protocol initiated  Fluid resuscitation ordered per protocol  Crystalloid Fluid Administration: Fluid resuscitation ordered per standard protocol - 30 mL/kg per current or ideal body weight  IV antibiotics as appropriate for source of sepsis  Reassessment: I have reassessed the patient's hemodynamic status   decreased ability to use arms for pushing/pulling/impaired ability to control trunk for mobility

## 2024-11-01 ENCOUNTER — HOSPITAL ENCOUNTER (EMERGENCY)
Facility: MEDICAL CENTER | Age: 24
End: 2024-11-02
Attending: STUDENT IN AN ORGANIZED HEALTH CARE EDUCATION/TRAINING PROGRAM

## 2024-11-01 DIAGNOSIS — F15.10 METHAMPHETAMINE ABUSE (HCC): ICD-10-CM

## 2024-11-01 PROCEDURE — 700111 HCHG RX REV CODE 636 W/ 250 OVERRIDE (IP): Mod: JZ,UD | Performed by: STUDENT IN AN ORGANIZED HEALTH CARE EDUCATION/TRAINING PROGRAM

## 2024-11-01 PROCEDURE — 99283 EMERGENCY DEPT VISIT LOW MDM: CPT

## 2024-11-01 RX ORDER — DROPERIDOL 2.5 MG/ML
5 INJECTION, SOLUTION INTRAMUSCULAR; INTRAVENOUS ONCE
Status: COMPLETED | OUTPATIENT
Start: 2024-11-01 | End: 2024-11-01

## 2024-11-01 RX ADMIN — DROPERIDOL 5 MG: 2.5 INJECTION, SOLUTION INTRAMUSCULAR; INTRAVENOUS at 23:16

## 2024-11-01 ASSESSMENT — FIBROSIS 4 INDEX: FIB4 SCORE: 0.6

## 2024-11-02 VITALS
SYSTOLIC BLOOD PRESSURE: 132 MMHG | BODY MASS INDEX: 24.34 KG/M2 | DIASTOLIC BLOOD PRESSURE: 78 MMHG | HEIGHT: 70 IN | RESPIRATION RATE: 18 BRPM | TEMPERATURE: 97 F | WEIGHT: 170 LBS | OXYGEN SATURATION: 98 % | HEART RATE: 75 BPM

## 2024-11-02 NOTE — ED NOTES
Discharge instructions given and discussed, signed copy in chart. Pt verbalized understanding and all questions answered. Pt discharged in stable condition. Personal belongings with patient.

## 2024-11-02 NOTE — ED PROVIDER NOTES
"ER Provider Note    Scribed for Dr. Mat Ramirez MD. by Ivette Keller. 11/1/2024  11:09 PM    Primary Care Provider: Pcp Pt States None    CHIEF COMPLAINT  Chief Complaint   Patient presents with    Drug Ingestion     Pt appears acutely agitated after using a drug he is unable to report       EXTERNAL RECORDS REVIEWED  Other Patient was last in the ED on 12/31/2022 for a psych evaluation.     HPI/ROS  LIMITATION TO HISTORY   Select: Altered mental status / Confusion    OUTSIDE HISTORIAN(S):  None     Thomas James Dancer V is a 24 y.o. male who presents to the ED for drug ingestion onset. The patient states he took drugs earlier and does not remember what they were or how much.       PAST MEDICAL HISTORY  History reviewed. No pertinent past medical history.    SURGICAL HISTORY  History reviewed. No pertinent surgical history.    FAMILY HISTORY  No family history on file.    SOCIAL HISTORY   reports that he has been smoking. He has never used smokeless tobacco. He reports current alcohol use. He reports current drug use.    CURRENT MEDICATIONS  There are no discharge medications for this patient.      ALLERGIES  Bee    PHYSICAL EXAM  /65   Pulse (!) 135   Temp 36 °C (96.8 °F) (Temporal)   Resp (!) 30   Ht 1.778 m (5' 10\")   Wt 77.1 kg (170 lb)   SpO2 94%   BMI 24.39 kg/m²   Physical Exam  Vitals and nursing note reviewed.   Constitutional:       Appearance: He is well-developed.   HENT:      Head: Normocephalic.   Cardiovascular:      Rate and Rhythm: Normal rate and regular rhythm.      Heart sounds: No murmur heard.  Pulmonary:      Effort: Pulmonary effort is normal.      Breath sounds: Normal breath sounds. No wheezing, rhonchi or rales.   Abdominal:      Palpations: Abdomen is soft.      Tenderness: There is no abdominal tenderness.   Musculoskeletal:         General: Normal range of motion.      Right lower leg: No edema.      Left lower leg: No edema.   Skin:     General: Skin is warm. "   Neurological:      General: No focal deficit present.      Mental Status: He is alert and oriented to person, place, and time.   Psychiatric:      Comments: Thought blocking but redirectable, disheveled, poor hygiene mildly pressured speech.  Linear thought process       DIAGNOSTIC STUDIES & PROCEDURES      COURSE & MEDICAL DECISION MAKING    INITIAL ASSESSMENT AND PLAN  Care Narrative:     11:09 PM - Patient seen and evaluated at bedside. 23 yo M PMH methamphetamine abuse who presents acutely intoxicated.  I suspect the patient is having hallucinations based off his appearance in the room however he is redirectable and able to tell me that he did some drugs earlier but he is unsure what they were.  Remainder of his physical exam is benign and he otherwise appears disheveled but well.  He was given Versed by EMS due to acute agitation and I gave him additional droperidol which seemed to help tremendously as he then was able to ambulate to the bathroom with no further agitation.  He was given additional time to metabolize and after multiple reassessments was clinically stable and sober and safe for discharge.  He was counseled on drug cessation.    DISPOSITION AND DISCUSSIONS    I have discussed management of the patient with the following physicians and EDITH's: None     Discussion of management with other QHP or appropriate source(s): None     Escalation of care considered, and ultimately not performed: .    Barriers to care at this time, including but not limited to: Patient does not have established PCP.     Decision tools and prescription drugs considered including, but not limited to: .        FINAL IMPRESSION   1. Methamphetamine abuse (HCC)         Ivette MELISSA (Leah), am scribing for, and in the presence of, Mat Ramirez M.D..    Electronically signed by: Ivette Maxwell), 11/1/2024    Mat MELISSA M.D. personally performed the services described in this documentation, as  scribed by Ivette Keller in my presence, and it is both accurate and complete.    The note accurately reflects work and decisions made by me.  Mat Ramirez M.D.  11/4/2024  4:32 PM

## 2024-11-02 NOTE — ED NOTES
Pt ambulated to bathroom. Attempted to call pt father for ride, no answer, pt states no phone or money for cab. Spoke with charge rn, stated ok for pt to wait till morning to IA.

## 2024-11-02 NOTE — ED TRIAGE NOTES
Pt BIBA via [facility]    Chief Complaint   Patient presents with    Drug Ingestion     Pt appears acutely agitated after using a drug he is unable to report     ABCs intact. Pt uncooperative with questioning/assessment. Skin PWD. Vitals/tele on the monitor.

## 2024-12-08 ENCOUNTER — APPOINTMENT (OUTPATIENT)
Dept: RADIOLOGY | Facility: MEDICAL CENTER | Age: 24
End: 2024-12-08
Attending: EMERGENCY MEDICINE

## 2024-12-08 ENCOUNTER — HOSPITAL ENCOUNTER (EMERGENCY)
Facility: MEDICAL CENTER | Age: 24
End: 2024-12-09
Attending: EMERGENCY MEDICINE

## 2024-12-08 DIAGNOSIS — R45.1 AGITATION: ICD-10-CM

## 2024-12-08 DIAGNOSIS — R00.0 TACHYCARDIA: ICD-10-CM

## 2024-12-08 DIAGNOSIS — F15.10 STIMULANT ABUSE (HCC): ICD-10-CM

## 2024-12-08 LAB
ALBUMIN SERPL BCP-MCNC: 4.8 G/DL (ref 3.2–4.9)
ALBUMIN/GLOB SERPL: 1.7 G/DL
ALP SERPL-CCNC: 57 U/L (ref 30–99)
ALT SERPL-CCNC: 31 U/L (ref 2–50)
AMPHET UR QL SCN: POSITIVE
ANION GAP SERPL CALC-SCNC: 15 MMOL/L (ref 7–16)
APPEARANCE UR: CLEAR
AST SERPL-CCNC: 42 U/L (ref 12–45)
BARBITURATES UR QL SCN: NEGATIVE
BASOPHILS # BLD AUTO: 0.2 % (ref 0–1.8)
BASOPHILS # BLD: 0.02 K/UL (ref 0–0.12)
BENZODIAZ UR QL SCN: NEGATIVE
BILIRUB SERPL-MCNC: 0.5 MG/DL (ref 0.1–1.5)
BILIRUB UR QL STRIP.AUTO: NEGATIVE
BUN SERPL-MCNC: 13 MG/DL (ref 8–22)
BZE UR QL SCN: POSITIVE
CALCIUM ALBUM COR SERPL-MCNC: 9.3 MG/DL (ref 8.5–10.5)
CALCIUM SERPL-MCNC: 9.9 MG/DL (ref 8.5–10.5)
CANNABINOIDS UR QL SCN: POSITIVE
CHLORIDE SERPL-SCNC: 113 MMOL/L (ref 96–112)
CO2 SERPL-SCNC: 18 MMOL/L (ref 20–33)
COLOR UR: YELLOW
CREAT SERPL-MCNC: 1.11 MG/DL (ref 0.5–1.4)
EKG IMPRESSION: NORMAL
EOSINOPHIL # BLD AUTO: 0 K/UL (ref 0–0.51)
EOSINOPHIL NFR BLD: 0 % (ref 0–6.9)
ERYTHROCYTE [DISTWIDTH] IN BLOOD BY AUTOMATED COUNT: 39.1 FL (ref 35.9–50)
ETHANOL BLD-MCNC: <10.1 MG/DL
FENTANYL UR QL: NEGATIVE
GFR SERPLBLD CREATININE-BSD FMLA CKD-EPI: 95 ML/MIN/1.73 M 2
GLOBULIN SER CALC-MCNC: 2.8 G/DL (ref 1.9–3.5)
GLUCOSE SERPL-MCNC: 114 MG/DL (ref 65–99)
GLUCOSE UR STRIP.AUTO-MCNC: NEGATIVE MG/DL
HCT VFR BLD AUTO: 44.3 % (ref 42–52)
HGB BLD-MCNC: 15.7 G/DL (ref 14–18)
IMM GRANULOCYTES # BLD AUTO: 0.05 K/UL (ref 0–0.11)
IMM GRANULOCYTES NFR BLD AUTO: 0.5 % (ref 0–0.9)
KETONES UR STRIP.AUTO-MCNC: ABNORMAL MG/DL
LACTATE SERPL-SCNC: 3.5 MMOL/L (ref 0.5–2)
LEUKOCYTE ESTERASE UR QL STRIP.AUTO: NEGATIVE
LIPASE SERPL-CCNC: 24 U/L (ref 11–82)
LYMPHOCYTES # BLD AUTO: 1.23 K/UL (ref 1–4.8)
LYMPHOCYTES NFR BLD: 11.7 % (ref 22–41)
MCH RBC QN AUTO: 30.6 PG (ref 27–33)
MCHC RBC AUTO-ENTMCNC: 35.4 G/DL (ref 32.3–36.5)
MCV RBC AUTO: 86.4 FL (ref 81.4–97.8)
METHADONE UR QL SCN: NEGATIVE
MICRO URNS: ABNORMAL
MONOCYTES # BLD AUTO: 0.58 K/UL (ref 0–0.85)
MONOCYTES NFR BLD AUTO: 5.5 % (ref 0–13.4)
NEUTROPHILS # BLD AUTO: 8.64 K/UL (ref 1.82–7.42)
NEUTROPHILS NFR BLD: 82.1 % (ref 44–72)
NITRITE UR QL STRIP.AUTO: NEGATIVE
NRBC # BLD AUTO: 0 K/UL
NRBC BLD-RTO: 0 /100 WBC (ref 0–0.2)
OPIATES UR QL SCN: NEGATIVE
OXYCODONE UR QL SCN: NEGATIVE
PCP UR QL SCN: NEGATIVE
PH UR STRIP.AUTO: 5.5 [PH] (ref 5–8)
PLATELET # BLD AUTO: 320 K/UL (ref 164–446)
PMV BLD AUTO: 9 FL (ref 9–12.9)
POTASSIUM SERPL-SCNC: 4 MMOL/L (ref 3.6–5.5)
PROPOXYPH UR QL SCN: NEGATIVE
PROT SERPL-MCNC: 7.6 G/DL (ref 6–8.2)
PROT UR QL STRIP: NEGATIVE MG/DL
RBC # BLD AUTO: 5.13 M/UL (ref 4.7–6.1)
RBC UR QL AUTO: NEGATIVE
SODIUM SERPL-SCNC: 146 MMOL/L (ref 135–145)
SP GR UR STRIP.AUTO: 1.02
UROBILINOGEN UR STRIP.AUTO-MCNC: 1 EU/DL
WBC # BLD AUTO: 10.5 K/UL (ref 4.8–10.8)

## 2024-12-08 PROCEDURE — 83605 ASSAY OF LACTIC ACID: CPT

## 2024-12-08 PROCEDURE — 85025 COMPLETE CBC W/AUTO DIFF WBC: CPT

## 2024-12-08 PROCEDURE — 99285 EMERGENCY DEPT VISIT HI MDM: CPT

## 2024-12-08 PROCEDURE — 36415 COLL VENOUS BLD VENIPUNCTURE: CPT

## 2024-12-08 PROCEDURE — 700111 HCHG RX REV CODE 636 W/ 250 OVERRIDE (IP): Mod: JZ | Performed by: EMERGENCY MEDICINE

## 2024-12-08 PROCEDURE — 83690 ASSAY OF LIPASE: CPT

## 2024-12-08 PROCEDURE — 80053 COMPREHEN METABOLIC PANEL: CPT

## 2024-12-08 PROCEDURE — 81003 URINALYSIS AUTO W/O SCOPE: CPT

## 2024-12-08 PROCEDURE — 82077 ASSAY SPEC XCP UR&BREATH IA: CPT

## 2024-12-08 PROCEDURE — 80307 DRUG TEST PRSMV CHEM ANLYZR: CPT

## 2024-12-08 PROCEDURE — 96372 THER/PROPH/DIAG INJ SC/IM: CPT

## 2024-12-08 PROCEDURE — 700105 HCHG RX REV CODE 258: Performed by: EMERGENCY MEDICINE

## 2024-12-08 PROCEDURE — 93005 ELECTROCARDIOGRAM TRACING: CPT | Mod: TC | Performed by: EMERGENCY MEDICINE

## 2024-12-08 PROCEDURE — 71045 X-RAY EXAM CHEST 1 VIEW: CPT

## 2024-12-08 PROCEDURE — 96374 THER/PROPH/DIAG INJ IV PUSH: CPT

## 2024-12-08 RX ORDER — SODIUM CHLORIDE 9 MG/ML
1000 INJECTION, SOLUTION INTRAVENOUS ONCE
Status: COMPLETED | OUTPATIENT
Start: 2024-12-08 | End: 2024-12-08

## 2024-12-08 RX ORDER — LORAZEPAM 2 MG/ML
2 INJECTION INTRAMUSCULAR ONCE
Status: COMPLETED | OUTPATIENT
Start: 2024-12-08 | End: 2024-12-08

## 2024-12-08 RX ORDER — LORAZEPAM 2 MG/ML
4 INJECTION INTRAMUSCULAR ONCE
Status: COMPLETED | OUTPATIENT
Start: 2024-12-08 | End: 2024-12-08

## 2024-12-08 RX ORDER — DIPHENHYDRAMINE HYDROCHLORIDE 50 MG/ML
50 INJECTION INTRAMUSCULAR; INTRAVENOUS ONCE
Status: COMPLETED | OUTPATIENT
Start: 2024-12-08 | End: 2024-12-08

## 2024-12-08 RX ORDER — HALOPERIDOL 5 MG/ML
5 INJECTION INTRAMUSCULAR ONCE
Status: COMPLETED | OUTPATIENT
Start: 2024-12-08 | End: 2024-12-08

## 2024-12-08 RX ADMIN — LORAZEPAM 4 MG: 2 INJECTION INTRAMUSCULAR; INTRAVENOUS at 19:30

## 2024-12-08 RX ADMIN — SODIUM CHLORIDE 1000 ML: 9 INJECTION, SOLUTION INTRAVENOUS at 19:33

## 2024-12-08 RX ADMIN — HALOPERIDOL LACTATE 5 MG: 5 INJECTION, SOLUTION INTRAMUSCULAR at 19:18

## 2024-12-08 RX ADMIN — DIPHENHYDRAMINE HYDROCHLORIDE 50 MG: 50 INJECTION, SOLUTION INTRAMUSCULAR; INTRAVENOUS at 19:18

## 2024-12-08 RX ADMIN — LORAZEPAM 2 MG: 2 INJECTION INTRAMUSCULAR; INTRAVENOUS at 19:44

## 2024-12-08 ASSESSMENT — FIBROSIS 4 INDEX: FIB4 SCORE: 0.69

## 2024-12-09 ENCOUNTER — HOSPITAL ENCOUNTER (EMERGENCY)
Facility: MEDICAL CENTER | Age: 24
End: 2024-12-09
Attending: EMERGENCY MEDICINE

## 2024-12-09 VITALS
RESPIRATION RATE: 17 BRPM | HEART RATE: 88 BPM | WEIGHT: 170 LBS | HEIGHT: 70 IN | OXYGEN SATURATION: 97 % | SYSTOLIC BLOOD PRESSURE: 162 MMHG | BODY MASS INDEX: 24.34 KG/M2 | TEMPERATURE: 98.6 F | DIASTOLIC BLOOD PRESSURE: 89 MMHG

## 2024-12-09 VITALS
RESPIRATION RATE: 20 BRPM | DIASTOLIC BLOOD PRESSURE: 88 MMHG | OXYGEN SATURATION: 95 % | SYSTOLIC BLOOD PRESSURE: 146 MMHG | HEART RATE: 116 BPM | TEMPERATURE: 97.2 F

## 2024-12-09 DIAGNOSIS — F15.10 METHAMPHETAMINE ABUSE (HCC): ICD-10-CM

## 2024-12-09 PROCEDURE — 99283 EMERGENCY DEPT VISIT LOW MDM: CPT

## 2024-12-09 NOTE — ED NOTES
"Pt beginning to rest in bed. Monitor attached to patient. Now answering questions appropriately. A&Ox3-4. Pt states he \"will not comment\" if he used any drugs today  "
Bedside report received from Nadia LUCAS. Patient connected to cardiac monitor, pulse ox, and automatic BP. Fall precautions in place. Call light within reach. No supplemental O2 use at this time.     Pt calm, respirations even and unlabored. NAD noted. Bed alarm on  
MD called to bedside as he was up out of bed, not responding to redirection and removing monitors. Security called to bedside. Pt redirected back to bed. Pt still agitated and paranoid.Would not put on pulse oximeter. Medicated per MAR for continued agitation  
Patient AAOx4. Discharge education/summary reviewed with patient, questions/concerns addressed with pt. Patient verbalized understanding of education provided. Pt ambulatory out to lobby with a steady gait, NAD noted. Pt discharged home to self care.     
Patient resting, even chest rise and fall noted, VSS, NAD noted. No identifiable needs noted at this time. Bed in lowest position and locked. Call light within reach.     
Pt still constantly moving and unable to follow instruction. Attempting IV but pt still unable to hold still.   
Pt up to bathroom with a steady gait     ERP at bedside discussing POC/results   
Pts dad called - agreeable to pick pt up   
Report from LANCE Card.  
Report to Patel LUCAS  
Urine sample sent to lab   
35.7

## 2024-12-09 NOTE — ED PROVIDER NOTES
"ED Provider Note    CHIEF COMPLAINT  Chief Complaint   Patient presents with    Other    Rapid Heart Beat       EXTERNAL RECORDS REVIEWED  External ED Note patient has been seen both at our facility and [facility] for amphetamine abuse ecstasy use and tachycardia, in 2022 he was seen there and his heart rate was in the 140s throughout his stay.    HPI/ROS  LIMITATION TO HISTORY   Select: Patient cooperation  OUTSIDE HISTORIAN(S):  EMS patient was found at a convenience store with abnormal erratic behavior and was brought in by EMS they were unable to obtain a glucose or blood pressure but he was tachycardic    Thomas James Dancer V is a 24 y.o. male who presents to the emergency department with chief complaint of altered mental status.  When asked patient what drugs he did today he said \"no,\".  He keeps just fidgeting and trying to readjust his bracelet in his shoes and his pants and he cannot really get comfortable and he will stop and allow us to get a blood pressure.  He does not want hurt himself he is oriented to place but otherwise will not really answer any other questions.    PAST MEDICAL HISTORY       SURGICAL HISTORY  patient denies any surgical history    FAMILY HISTORY  No family history on file.    SOCIAL HISTORY  Social History     Tobacco Use    Smoking status: Every Day    Smokeless tobacco: Never   Vaping Use    Vaping status: Never Used   Substance and Sexual Activity    Alcohol use: Yes    Drug use: Yes     Comment: meth    Sexual activity: Not on file       CURRENT MEDICATIONS  Home Medications    **Home medications have not yet been reviewed for this encounter**         ALLERGIES  Allergies   Allergen Reactions    Bee Anaphylaxis and Swelling     Patient also reports he swells where he gets stung        PHYSICAL EXAM  VITAL SIGNS: BP (!) 132/91   Pulse (!) 171   Temp 37 °C (98.6 °F) (Oral)   Resp (!) 26   Ht 1.778 m (5' 10\")   Wt 77.1 kg (170 lb)   SpO2 95%   BMI 24.39 kg/m²  "   Pulse OX: Pulse Oxygen level is normal on room air  Constitutional: Alert in no apparent distress.  HENT: Normocephalic, Atraumatic, MMM  Eyes: Pupils dilated bilaterally conjunctiva normal, non-icteric.   Heart:  Regular rhythm tachycardic, intact distal pulses   Lungs: Symmetrical movement, no resp distress clear to auscultation bilaterally  Abdomen: Non-tender, non-distended, normal bowel sounds  EXT/Back no edema  Skin: Warm, diaphoretic no erythema, No rash.   Neurologic: Alert oriented to place otherwise not answering grossly non-focal.       EKG/LABS  Labs Reviewed   CBC WITH DIFFERENTIAL - Abnormal; Notable for the following components:       Result Value    Neutrophils-Polys 82.10 (*)     Lymphocytes 11.70 (*)     Neutrophils (Absolute) 8.64 (*)     All other components within normal limits   COMP METABOLIC PANEL - Abnormal; Notable for the following components:    Sodium 146 (*)     Chloride 113 (*)     Co2 18 (*)     Glucose 114 (*)     All other components within normal limits   LACTIC ACID - Abnormal; Notable for the following components:    Lactic Acid 3.5 (*)     All other components within normal limits   URINE DRUG SCREEN - Abnormal; Notable for the following components:    Amphetamines Urine Positive (*)     Cocaine Metabolite Positive (*)     Cannabinoid Metab Positive (*)     All other components within normal limits   URINALYSIS,CULTURE IF INDICATED - Abnormal; Notable for the following components:    Ketones Trace (*)     All other components within normal limits   LIPASE   DIAGNOSTIC ALCOHOL   ESTIMATED GFR     Results for orders placed or performed during the hospital encounter of 12/08/24   EKG (NOW)   Result Value Ref Range    Report       [facility] Emergency Dept.    Test Date:  2024-12-08  Pt Name:    THOMAS DANCER                Department: ER  MRN:        [id number]                      Room:        30  Gender:     Male                         Technician:  [id number]  :        2000                   Requested By:NISSA MARROQUIN  Order #:    [id number]                    Reading MD: Nissa Marroquin MD    Measurements  Intervals                                Axis  Rate:       154                          P:          70  AR:         111                          QRS:        97  QRSD:       89                           T:          -13  QT:         284  QTc:        455    Interpretive Statements  Sinus tachycardia rate of 154 no ST elevations or ST depressions no abnormal  T wave inversions or Q waves  Compared to ECG 2022 13:58:19  sinus tach    Electronically Signed On 2024 20:40:05 PST by Nissa Marroquin MD         I have independently interpreted this EKG    RADIOLOGY/PROCEDURES   I have independently interpreted the diagnostic imaging associated with this visit and am waiting the final reading from the radiologist.   My preliminary interpretation is as follows:     CXR - without CM or effusion     Radiologist interpretation:  DX-CHEST-PORTABLE (1 VIEW)   Final Result         1.  No acute cardiopulmonary disease.          COURSE & MEDICAL DECISION MAKING    ASSESSMENT, COURSE AND PLAN  Care Narrative:     Patient is a 24-year-old male who presents the emergency department likely with drug and use agitation tachycardia.  He is tachycardic to 170 his pupils are dilated he has a history of sympathomimetic abuse.  He will be treated here in the emerge department with Haldol Benadryl and 4 mg of Ativan and then reassessed.      DISPOSITION AND DISCUSSIONS  ED OBS: Yes; I am placing the patient in to an observation status due to a diagnostic uncertainty as well as therapeutic intensity. Patient placed in observation status at 7:16 PM, 2024.     Observation plan is as follows: Medication laboratory analysis    Upon Reevaluation, the patient's condition has: Improved; and will be discharged.    Patient discharged from ED Observation status at 12:16 AM   (Time) 12/09/24  (Date).     7:42 PM  We had finally gotten the blood pressure on the patient his heart rates down in the 150s and EKG shows that it sinus tachycardia but unfortunately the patient is now ambulatory trying to pull his IV out had to be redirected to the bed and is still quite agitated.  Will be given another 2 mg of Ativan IV this time      8:45 PM  Patient is now resting and sleeping comfortably his heart rate is coming down appropriately he is receiving the IV fluids and doing well.    11:08 PM  Patient has been sleeping comfortably provided a urine sample and his heart rate and vital signs were all normalized.    12:15 AM  Ambulated to the restroom, alert oriented x4 states he can go to his dad's house still want talk about the drugs use although urine drug was positive for cocaine and amphetamines.  We discussed the risks of using sympathomimetics and he will be discharged.    IV fluids were given secondary to signs of dehydration tachycardia.    CRITICAL CARE  The very real possibilty of a deterioration of this patient's condition required the highest level of my preparedness for sudden, emergent intervention.  I provided critical care services, which included medication orders, frequent reevaluations of the patient's condition and response to treatment, ordering and reviewing test results, and discussing the case with various consultants.  The critical care time associated with the care of the patient was 40 minutes. Review chart for interventions. This time is exclusive of any other billable procedures.       I have discussed management of the patient with the following physicians and EDITH's:  none    Discussion of management with other [facility] [place] or appropriate source(s): None     Escalation of care considered, and ultimately not performed:acute inpatient care management, however at this time, the patient is most appropriate for outpatient management    Barriers to care at this time, including but not  limited to:  na .     Decision tools and prescription drugs considered including, but not limited to:  na .    The patient will return for new or worsening symptoms and is stable at the time of discharge.    The patient is referred to a primary physician for blood pressure management, diabetic screening, and for all other preventative health concerns.    DISPOSITION:  Patient will be discharged home in stable condition.    FOLLOW UP:  [facility], Emergency Dept  [address] [id number] [phone]    If symptoms worsen    [facility]  [address] [facility] [address]  [phone]  Schedule an appointment as soon as possible for a visit         OUTPATIENT MEDICATIONS:  New Prescriptions    No medications on file         FINAL DIAGNOSIS  1. Stimulant abuse (HCC)    2. Tachycardia    3. Agitation         Electronically signed by: Nissa Branham M.D., 12/8/2024 7:14 PM

## 2024-12-09 NOTE — ED TRIAGE NOTES
Thomas James Dancer V  24 y.o. male    Chief Complaint   Patient presents with    Other    Rapid Heart Beat       Pt arrives with unknown complaint by EMS. Found at [employer] store with abnormal behavior. Pt arrives with erratic behavior and unable to follow directions. Pt unable to hold still for vital signs. HR 170bpm    Unable to answer questions. Asking for items to be moved around repeatedly.

## 2024-12-10 NOTE — ED PROVIDER NOTES
"ER Provider Note    Scribed for  Federico Jane D.O. by Milton Marinelli. 12/9/2024   11:26 PM    Primary Care Provider: Pcp Pt States None    CHIEF COMPLAINT  Chief Complaint   Patient presents with    Psych Eval     Pt bib [facility]sa for fernanda.  Pt has hx of drug abuse, seen in ED earlier today.  Pt states he is \"having pain everywhere\".       EXTERNAL RECORDS REVIEWED  Inpatient Notes Patient was admitted for toxic encephalopathy.       HPI/ROS  LIMITATION TO HISTORY   Select: : None  OUTSIDE HISTORIAN(S):  None.     Thomas James Dancer V is a 24 y.o. male who presents to the ED, when I ask him why he is here he states that he really would like to have a nice place to sleep after smoking methamphetamine.. Patient reports that he would like to go to sleep but is unable to. He was seen here in the ED yesterday and states that he did not go home to his father's house after discharge, and he did not get much sleep. He smoked methamphetamine today, and reports that he smokes methamphetamine on week days.  He has no particular complaint at this time.    PAST MEDICAL HISTORY  Polysubstance abuse.  Toxic encephalopathy    SURGICAL HISTORY  History reviewed. No pertinent surgical history.    FAMILY HISTORY  None pertinent.     SOCIAL HISTORY   reports that he has been smoking. He has never used smokeless tobacco. He reports current alcohol use. He reports current drug use.    ALLERGIES  Allergies   Allergen Reactions    Bee Anaphylaxis and Swelling     Patient also reports he swells where he gets stung         PHYSICAL EXAM  BP (!) 158/103   Pulse (!) 118   Temp 36.2 °C (97.1 °F) (Temporal)   Resp (!) 22   SpO2 95%    General: No acute distress  Neuro: Awake alert and oriented, muscle strength sensation normal  Neck: Supple  Cardiac: Slightly tachycardic with regular rhythm  Pulmonary: Clear to auscultation bilaterally no distress  Abdomen: Soft nontender nondistended  Back: Nontender  Psych: Anxious, denies suicidal " homicidal ideation.  Skin: Pink warm dry  Extremities: Full range of motion, muscle strength sensation intact 2+ pulses       COURSE & MEDICAL DECISION MAKING     INITIAL ASSESSMENT, COURSE AND PLAN  Differential diagnoses include but not limited to: Methamphetamine use vs. Poly substance abuse vs. Homelessness.      Care Narrative: Patient presents to the emergency department because he is looking for a place to sleep after smoking methamphetamine.  And it is cold outside.    11:26 PM - Patient was first seen and evaluated at bedside. Patient presents to the ED for psychiatric evaluation and has a known history of methamphetamine use. I discussed plan for discharge and follow up as outlined below. The patient is stable for discharge at this time and will return for any new or worsening symptoms. Patient verbalizes understanding and support with my plan for discharge.         ED COURSE AND ADDITIONAL PROBLEMS    Methamphetamine abuse: Patient states that he smoked methamphetamine and now is looking for a place to sleep.  We counseled him on methamphetamine abuse.  Cardiopulmonary very abdominal neurological exams are all normal. Slightly tachycardic.  Denies weakness numbness changes in vision difficulty in relating denies chest pain back pain shortness of breath abdominal pain nausea vomiting diarrhea.     DISPOSITION AND DISCUSSIONS    I have discussed management of the patient with the following physicians and EDITH's:  None.     Discussion of management with other QHP or appropriate source(s): None     Escalation of care considered, and ultimately not performed: Laboratory analysis.    Barriers to care at this time, including but not limited to: Patient does not have established PCP, Patient is homeless, Patient had difficult affording medications, and Patient lacks financial resources.     Decision tools and prescription drugs considered including, but not limited to:  none .     The patient will return for new  or worsening symptoms and is stable at the time of discharge.    The patient is referred to a primary physician for blood pressure management, diabetic screening, and for all other preventative health concerns.    DISPOSITION:  Patient will be discharged home in stable condition.    FOLLOW UP:  [facility], Emergency Dept  [address] [id number] [phone]    As needed, If symptoms worsen      FINAL DIAGNOSIS  1. Methamphetamine abuse (HCC)         Milton MELISSA (Scribe), am scribing for, and in the presence of, Federico Jane D.O..    Electronically signed by: Milton Marinelli (Leah), 12/9/2024    Federico MELISSA D.O. personally performed the services described in this documentation, as scribed by Milton Marinelli in my presence, and it is both accurate and complete.      The note accurately reflects work and decisions made by me.  Federico Jane D.O.  12/9/2024  11:55 PM

## 2024-12-10 NOTE — ED NOTES
Pt provided with discharge ppw and instructions.  Pt requested to throw away discharge papers.  Pt ambulatory with steady gait. Left ED in stable condition.

## 2024-12-10 NOTE — ED TRIAGE NOTES
"Chief Complaint   Patient presents with    Psych Eval     Pt bib [facility]sa for fernanda.  Pt has hx of drug abuse, seen in ED earlier today.  Pt states he is \"having pain everywhere\".       Pt connected to monitor.  Pt removed all clothing stating, \"I feel like its choking me.  Please, take it off.\"  RN went to get gown and pt had removed all clothing.  Pt changed into gown, warm blankets provided.    "

## 2024-12-29 ENCOUNTER — HOSPITAL ENCOUNTER (EMERGENCY)
Facility: MEDICAL CENTER | Age: 24
End: 2024-12-29
Attending: EMERGENCY MEDICINE

## 2024-12-29 VITALS
RESPIRATION RATE: 18 BRPM | BODY MASS INDEX: 24.39 KG/M2 | WEIGHT: 170 LBS | TEMPERATURE: 98.1 F | SYSTOLIC BLOOD PRESSURE: 134 MMHG | DIASTOLIC BLOOD PRESSURE: 107 MMHG | HEART RATE: 79 BPM | OXYGEN SATURATION: 95 %

## 2024-12-29 DIAGNOSIS — F19.90 DRUG USE: ICD-10-CM

## 2024-12-29 DIAGNOSIS — R00.0 SINUS TACHYCARDIA: ICD-10-CM

## 2024-12-29 DIAGNOSIS — R45.1 AGITATION: ICD-10-CM

## 2024-12-29 LAB
ALBUMIN SERPL BCP-MCNC: 4.5 G/DL (ref 3.2–4.9)
ALBUMIN/GLOB SERPL: 1.7 G/DL
ALP SERPL-CCNC: 56 U/L (ref 30–99)
ALT SERPL-CCNC: 16 U/L (ref 2–50)
AMPHET UR QL SCN: POSITIVE
ANION GAP SERPL CALC-SCNC: 14 MMOL/L (ref 7–16)
AST SERPL-CCNC: 17 U/L (ref 12–45)
BARBITURATES UR QL SCN: NEGATIVE
BASOPHILS # BLD AUTO: 0.2 % (ref 0–1.8)
BASOPHILS # BLD: 0.02 K/UL (ref 0–0.12)
BENZODIAZ UR QL SCN: POSITIVE
BILIRUB SERPL-MCNC: 0.4 MG/DL (ref 0.1–1.5)
BUN SERPL-MCNC: 10 MG/DL (ref 8–22)
BZE UR QL SCN: NEGATIVE
CALCIUM ALBUM COR SERPL-MCNC: 8.9 MG/DL (ref 8.5–10.5)
CALCIUM SERPL-MCNC: 9.3 MG/DL (ref 8.5–10.5)
CANNABINOIDS UR QL SCN: POSITIVE
CHLORIDE SERPL-SCNC: 108 MMOL/L (ref 96–112)
CO2 SERPL-SCNC: 20 MMOL/L (ref 20–33)
CREAT SERPL-MCNC: 0.76 MG/DL (ref 0.5–1.4)
EOSINOPHIL # BLD AUTO: 0 K/UL (ref 0–0.51)
EOSINOPHIL NFR BLD: 0 % (ref 0–6.9)
ERYTHROCYTE [DISTWIDTH] IN BLOOD BY AUTOMATED COUNT: 38.2 FL (ref 35.9–50)
ETHANOL BLD-MCNC: <10.1 MG/DL
FENTANYL UR QL: NEGATIVE
GFR SERPLBLD CREATININE-BSD FMLA CKD-EPI: 128 ML/MIN/1.73 M 2
GLOBULIN SER CALC-MCNC: 2.7 G/DL (ref 1.9–3.5)
GLUCOSE SERPL-MCNC: 104 MG/DL (ref 65–99)
HCT VFR BLD AUTO: 42.5 % (ref 42–52)
HGB BLD-MCNC: 15.2 G/DL (ref 14–18)
IMM GRANULOCYTES # BLD AUTO: 0.03 K/UL (ref 0–0.11)
IMM GRANULOCYTES NFR BLD AUTO: 0.3 % (ref 0–0.9)
LYMPHOCYTES # BLD AUTO: 1.59 K/UL (ref 1–4.8)
LYMPHOCYTES NFR BLD: 17.2 % (ref 22–41)
MAGNESIUM SERPL-MCNC: 1.7 MG/DL (ref 1.5–2.5)
MCH RBC QN AUTO: 30.8 PG (ref 27–33)
MCHC RBC AUTO-ENTMCNC: 35.8 G/DL (ref 32.3–36.5)
MCV RBC AUTO: 86.2 FL (ref 81.4–97.8)
METHADONE UR QL SCN: NEGATIVE
MONOCYTES # BLD AUTO: 0.48 K/UL (ref 0–0.85)
MONOCYTES NFR BLD AUTO: 5.2 % (ref 0–13.4)
NEUTROPHILS # BLD AUTO: 7.1 K/UL (ref 1.82–7.42)
NEUTROPHILS NFR BLD: 77.1 % (ref 44–72)
NRBC # BLD AUTO: 0 K/UL
NRBC BLD-RTO: 0 /100 WBC (ref 0–0.2)
OPIATES UR QL SCN: NEGATIVE
OXYCODONE UR QL SCN: NEGATIVE
PCP UR QL SCN: NEGATIVE
PLATELET # BLD AUTO: 261 K/UL (ref 164–446)
PMV BLD AUTO: 9.5 FL (ref 9–12.9)
POTASSIUM SERPL-SCNC: 3.6 MMOL/L (ref 3.6–5.5)
PROPOXYPH UR QL SCN: NEGATIVE
PROT SERPL-MCNC: 7.2 G/DL (ref 6–8.2)
RBC # BLD AUTO: 4.93 M/UL (ref 4.7–6.1)
SODIUM SERPL-SCNC: 142 MMOL/L (ref 135–145)
TROPONIN T SERPL-MCNC: 12 NG/L (ref 6–19)
TSH SERPL DL<=0.005 MIU/L-ACNC: 2.66 UIU/ML (ref 0.38–5.33)
WBC # BLD AUTO: 9.2 K/UL (ref 4.8–10.8)

## 2024-12-29 PROCEDURE — 83735 ASSAY OF MAGNESIUM: CPT

## 2024-12-29 PROCEDURE — 700111 HCHG RX REV CODE 636 W/ 250 OVERRIDE (IP): Mod: JZ | Performed by: EMERGENCY MEDICINE

## 2024-12-29 PROCEDURE — 85025 COMPLETE CBC W/AUTO DIFF WBC: CPT

## 2024-12-29 PROCEDURE — 96374 THER/PROPH/DIAG INJ IV PUSH: CPT

## 2024-12-29 PROCEDURE — 84484 ASSAY OF TROPONIN QUANT: CPT

## 2024-12-29 PROCEDURE — 80053 COMPREHEN METABOLIC PANEL: CPT

## 2024-12-29 PROCEDURE — 84443 ASSAY THYROID STIM HORMONE: CPT

## 2024-12-29 PROCEDURE — 36415 COLL VENOUS BLD VENIPUNCTURE: CPT

## 2024-12-29 PROCEDURE — 94760 N-INVAS EAR/PLS OXIMETRY 1: CPT

## 2024-12-29 PROCEDURE — 96375 TX/PRO/DX INJ NEW DRUG ADDON: CPT

## 2024-12-29 PROCEDURE — 80307 DRUG TEST PRSMV CHEM ANLYZR: CPT

## 2024-12-29 PROCEDURE — 99285 EMERGENCY DEPT VISIT HI MDM: CPT

## 2024-12-29 PROCEDURE — 82077 ASSAY SPEC XCP UR&BREATH IA: CPT

## 2024-12-29 RX ORDER — LORAZEPAM 2 MG/ML
1 INJECTION INTRAMUSCULAR ONCE
Status: COMPLETED | OUTPATIENT
Start: 2024-12-29 | End: 2024-12-29

## 2024-12-29 RX ORDER — HALOPERIDOL 5 MG/ML
2.5 INJECTION INTRAMUSCULAR ONCE
Status: COMPLETED | OUTPATIENT
Start: 2024-12-29 | End: 2024-12-29

## 2024-12-29 RX ADMIN — HALOPERIDOL LACTATE 2.5 MG: 5 INJECTION, SOLUTION INTRAMUSCULAR at 02:46

## 2024-12-29 RX ADMIN — LORAZEPAM 1 MG: 2 INJECTION INTRAMUSCULAR; INTRAVENOUS at 02:41

## 2024-12-29 ASSESSMENT — FIBROSIS 4 INDEX: FIB4 SCORE: 0.57

## 2024-12-29 NOTE — ED TRIAGE NOTES
".  Chief Complaint   Patient presents with    Tachycardia     Pt presents BIBA with agitation and shakiness / tremors. Pt is tachycardic and tachypneic, unable to sit still. Requests that a blanket be over his face. Not a good historian, pt said he has \"maybe\" taken meth. He is diaphoretic.      Drug Abuse     .BP (!) 134/107   Pulse (!) 124   Temp 36.7 °C (98.1 °F) (Tympanic)   Resp (!) 32   Wt 77.1 kg (170 lb)   SpO2 92%   BMI 24.39 kg/m²     [place] extremely agitated. Pt had tachycardia, hypertension, and was hyperventilating when found by EMS. EMS gave intranasal versed to calm him down.     Pt is on heart monitor, EKG complete. Bed low and locked, call bell within reach.     ERP at bedside.  "

## 2024-12-29 NOTE — ED PROVIDER NOTES
"                                                        ED Provider Note    CHIEF COMPLAINT  Chief Complaint   Patient presents with    Tachycardia     Pt presents BIBA with agitation and shakiness / tremors. Pt is tachycardic and tachypneic, unable to sit still. Requests that a blanket be over his face. Not a good historian, pt said he has \"maybe\" taken meth. He is diaphoretic.      Drug Abuse        HPI    Primary care provider: Pcp Pt States None   History obtained from: Patient and EMS report  History limited by: Patient's clinical condition    Thomas James Dancer V is a 24 y.o. male who presents to the ED by EMS because he was [place] very agitated.  Limited history/review of systems/physical exam due to patient's clinical condition.  Patient refuses to answer most questions.  He is able to follow a few simple command but repeatedly pulls the blanket over his face.  Record review shows that he has had multiple past ED visits with similar presentation due to methamphetamine use.  He does not answer when asked whether he used any drugs.  EMS reports giving patient IV Versed and patient is calmer by the time of ED arrival.    REVIEW OF SYSTEMS  Please see HPI for pertinent positives/negatives.  Limited by patient's clinical condition.    PAST MEDICAL HISTORY  No past medical history on file.     SURGICAL HISTORY  No past surgical history on file.     SOCIAL HISTORY  Social History     Tobacco Use    Smoking status: Every Day    Smokeless tobacco: Never   Vaping Use    Vaping status: Never Used   Substance and Sexual Activity    Alcohol use: Yes    Drug use: Yes     Comment: meth    Sexual activity: Not on file        FAMILY HISTORY  No family history on file.     CURRENT MEDICATIONS  Home Medications       Reviewed by Mary Grace Aguilera R.N. (Registered Nurse) on 12/29/24 at 0214  Med List Status: Not Addressed     Medication Last Dose Status        Patient Reji Taking any Medications        "                     ALLERGIES  Allergies   Allergen Reactions    Bee Anaphylaxis and Swelling     Patient also reports he swells where he gets stung         PHYSICAL EXAM  VITAL SIGNS: BP (!) 134/107   Pulse 79   Temp 36.7 °C (98.1 °F) (Tympanic)   Resp 18   Wt 77.1 kg (170 lb)   SpO2 95%   BMI 24.39 kg/m²  @BLAIRE[[id number]::@     Pulse ox interpretation: 92% I interpret this pulse ox as normal     Cardiac monitor interpretation: Sinus tachycardia with heart rate in the 120s as interpreted by me.  The patient presented with agitation and tachycardia and cardiac monitor was ordered to monitor for dysrhythmia.    Constitutional: Well developed, well nourished, alert and repeatedly pulling the blanket over his head  HENT: No external signs of trauma, normocephalic  Eyes: PERRL, 3 mm bilaterally, conjunctiva without erythema, no discharge, no icterus    Neck: Soft and supple, trachea midline, no stridor, no tenderness, no LAD, good ROM    Cardiovascular: Regular and tachycardic, no murmurs/rubs/gallops, strong distal pulses and good perfusion    Thorax & Lungs: No respiratory distress, CTAB    Abdomen: Soft, nontender, nondistended, no guarding, no rebound, normal BS    Back: No CVAT     Extremities: No cyanosis, no edema, no gross deformity, good ROM, intact distal pulses with brisk cap refill    Skin: Warm, diaphoretic, no pallor/cyanosis, no rash noted      Neuro: Alert and oriented to self but not answering other questions, GCS [id number], moving all 4 extremities spontaneously without apparent focal deficits  Psychiatric: Anxious and agitated, does not answer most questions only follows a few simple commands      DIAGNOSTIC STUDIES / PROCEDURES    EKG  12 Lead EKG obtained at 0215 and interpreted by me:   Rate: 123   Rhythm: Sinus tachycardia  Ectopy: None  Intervals: Normal   Axis: RAD  QRS: Late precordial R wave transition  ST segments: Normal  T Waves: Normal    Clinical Impression: Sinus tachycardia with baseline  artifact with no acute ischemic changes or other dysrhythmia  Compared to December 8, 2024 without significant change      LABS  All labs reviewed by me.     Results for orders placed or performed during the hospital encounter of 12/29/24   CBC WITH DIFFERENTIAL    Collection Time: 12/29/24  3:12 AM   Result Value Ref Range    WBC 9.2 4.8 - 10.8 K/uL    RBC 4.93 4.70 - 6.10 M/uL    Hemoglobin 15.2 14.0 - 18.0 g/dL    Hematocrit 42.5 42.0 - 52.0 %    MCV 86.2 81.4 - 97.8 fL    MCH 30.8 27.0 - 33.0 pg    MCHC 35.8 32.3 - 36.5 g/dL    RDW 38.2 35.9 - 50.0 fL    Platelet Count 261 164 - 446 K/uL    MPV 9.5 9.0 - 12.9 fL    Neutrophils-Polys 77.10 (H) 44.00 - 72.00 %    Lymphocytes 17.20 (L) 22.00 - 41.00 %    Monocytes 5.20 0.00 - 13.40 %    Eosinophils 0.00 0.00 - 6.90 %    Basophils 0.20 0.00 - 1.80 %    Immature Granulocytes 0.30 0.00 - 0.90 %    Nucleated RBC 0.00 0.00 - 0.20 /100 WBC    Neutrophils (Absolute) 7.10 1.82 - 7.42 K/uL    Lymphs (Absolute) 1.59 1.00 - 4.80 K/uL    Monos (Absolute) 0.48 0.00 - 0.85 K/uL    Eos (Absolute) 0.00 0.00 - 0.51 K/uL    Baso (Absolute) 0.02 0.00 - 0.12 K/uL    Immature Granulocytes (abs) 0.03 0.00 - 0.11 K/uL    NRBC (Absolute) 0.00 K/uL   COMP METABOLIC PANEL    Collection Time: 12/29/24  3:12 AM   Result Value Ref Range    Sodium 142 135 - 145 mmol/L    Potassium 3.6 3.6 - 5.5 mmol/L    Chloride 108 96 - 112 mmol/L    Co2 20 20 - 33 mmol/L    Anion Gap 14.0 7.0 - 16.0    Glucose 104 (H) 65 - 99 mg/dL    Bun 10 8 - 22 mg/dL    Creatinine 0.76 0.50 - 1.40 mg/dL    Calcium 9.3 8.5 - 10.5 mg/dL    Correct Calcium 8.9 8.5 - 10.5 mg/dL    AST(SGOT) 17 12 - 45 U/L    ALT(SGPT) 16 2 - 50 U/L    Alkaline Phosphatase 56 30 - 99 U/L    Total Bilirubin 0.4 0.1 - 1.5 mg/dL    Albumin 4.5 3.2 - 4.9 g/dL    Total Protein 7.2 6.0 - 8.2 g/dL    Globulin 2.7 1.9 - 3.5 g/dL    A-G Ratio 1.7 g/dL   TROPONIN    Collection Time: 12/29/24  3:12 AM   Result Value Ref Range    Troponin T 12 6 - 19  ng/L   MAGNESIUM    Collection Time: 12/29/24  3:12 AM   Result Value Ref Range    Magnesium 1.7 1.5 - 2.5 mg/dL   DIAGNOSTIC ALCOHOL    Collection Time: 12/29/24  3:12 AM   Result Value Ref Range    Diagnostic Alcohol <10.1 <10.1 mg/dL   TSH WITH REFLEX TO FT4    Collection Time: 12/29/24  3:12 AM   Result Value Ref Range    TSH 2.660 0.380 - 5.330 uIU/mL   ESTIMATED GFR    Collection Time: 12/29/24  3:12 AM   Result Value Ref Range    GFR (CKD-EPI) 128 >60 mL/min/1.73 m 2   URINE DRUG SCREEN    Collection Time: 12/29/24  4:04 AM   Result Value Ref Range    Amphetamines Urine Positive (A) Negative    Barbiturates Negative Negative    Benzodiazepines Positive (A) Negative    Cocaine Metabolite Negative Negative    Fentanyl, Urine Negative Negative    Methadone Negative Negative    Opiates Negative Negative    Oxycodone Negative Negative    Phencyclidine -Pcp Negative Negative    Propoxyphene Negative Negative    Cannabinoid Metab Positive (A) Negative        RADIOLOGY  I have independently interpreted the diagnostic imaging associated with this visit and am waiting the final reading from the radiologist.     No orders to display          COURSE & MEDICAL DECISION MAKING  Nursing notes, VS, PMSFHx reviewed in chart.     Review of past medical records shows the patient has had 3 prior ED visits this month.  He was seen in this ED December 8, 2024 and December 9, 2024 with diagnosis of stimulant abuse, tachycardia and agitation on the first visit and diagnosis of methamphetamine abuse on a second visit.  Patient was seen at [facility] ED December 2, 2024 with diagnosis of amphetamine use and agitation.      Differential diagnoses considered include but are not limited to: CVA/TIA/intracranial hemorrhage, meningitis/encephalitis, encephalopathy, Sz, OD/intoxication, electrolyte abnormality, endocrine dysfunction, thyroid disorder       ED Observation Status? No; Patient does not meet criteria for ED Observation.        Discussion of management with other [facility] or appropriate source(s): None     Escalation of care considered, and ultimately not performed: diagnostic imaging and acute inpatient care management, however at this time, the patient is most appropriate for outpatient management.     Barriers to care at this time, including but not limited to: Patient does not have established PCP.     Decision tools and prescription drugs considered including, but not limited to: Medication modification   .        History and physical exam as above.  This is a 24-year-old male patient with medical history including methamphetamine use brought in by EMS because he [place] acting very agitated.  EMS gave patient Versed with some improvement.  In the ED, he continues to be agitated and therefore was given Ativan and low-dose Haldol.  Record review shows he has had many previous visits with similar presentation.  He has had unremarkable head CT twice in 2022.  He had unremarkable echo in 2022.  Laboratory today again shows positive amphetamines as well as cannabinoids on his UDS.  Negative alcohol level.  No electrolyte derangement or evidence for renal or hepatic/biliary dysfunction.  He was monitored in the ED and remained clinically stable.  Tachycardia improved.  Blood pressure also improving.  I discussed the findings with patient.  On recheck, he is noted to be resting calmly, in no acute distress and nontoxic in appearance.  He is alert and oriented x 3 without evidence for hallucinations or delusions.  He denies suicidal or homicidal ideations.  At this time, no indications for legal hold or evidence for emergent pathology or indications for admission.  He was advised to stop using drugs and to seek treatment if necessary.  He was advised on outpatient follow-up and return to ED precautions.  Patient verbalized understanding and agreed with plan of care with no further questions or concerns.      The  patient is referred to a primary physician for blood pressure management, diabetic screening, and for all other preventative health concerns.       FINAL IMPRESSION  1. Agitation Acute   2. Sinus tachycardia Acute   3. Drug use           DISPOSITION  Patient will be discharged home in stable condition.       FOLLOW UP  [facility] ([facility]) - Primary Care and Family Medicine  [address] [id number] [phone]  Call in 1 day      [facility] Primary Care  [address] [facility] [address]  [phone]  Call       [facility], Emergency Dept  [address] [address] [phone]    If symptoms worsen         OUTPATIENT MEDICATIONS  There are no discharge medications for this patient.         Electronically signed by: Oleg Parks D.O., 12/29/2024 2:28 AM      Portions of this record were made with voice recognition software.  Despite my review, errors may remain.  Please interpret this chart in the appropriate context.

## 2024-12-29 NOTE — ED NOTES
Thomas James Dancer V has been discharged from the Emergency Room.    IV discontinued and gauze bandage placed, pt in possession of belongings.    Discharge instructions, which include signs and symptoms to monitor patient for, as well as detailed information regarding Drug Use provided.  Patient verbalizes understanding of follow up care and medication management. All questions and concerns addressed at this time.     Patient provided with education on when to return to the ER and verbally understands with no concerns. Patient advised on setting up MyChart and information provided about patient survey.  Patient leaves ER in no apparent distress. This RN provided education regarding returning to the ER for any new concerns or changes in patient's condition.      BP (!) 134/107   Pulse 79   Temp 36.7 °C (98.1 °F) (Tympanic)   Resp 18   Wt 77.1 kg (170 lb)   SpO2 95%   BMI 24.39 kg/m²

## 2024-12-30 ENCOUNTER — HOSPITAL ENCOUNTER (EMERGENCY)
Facility: MEDICAL CENTER | Age: 24
End: 2024-12-30
Attending: EMERGENCY MEDICINE

## 2024-12-30 VITALS
RESPIRATION RATE: 16 BRPM | DIASTOLIC BLOOD PRESSURE: 88 MMHG | BODY MASS INDEX: 24.34 KG/M2 | OXYGEN SATURATION: 94 % | HEART RATE: 115 BPM | SYSTOLIC BLOOD PRESSURE: 136 MMHG | HEIGHT: 70 IN | TEMPERATURE: 97.6 F | WEIGHT: 170 LBS

## 2024-12-30 DIAGNOSIS — F15.10 METHAMPHETAMINE ABUSE (HCC): ICD-10-CM

## 2024-12-30 DIAGNOSIS — M54.50 BILATERAL LOW BACK PAIN WITHOUT SCIATICA, UNSPECIFIED CHRONICITY: ICD-10-CM

## 2024-12-30 LAB
ANION GAP SERPL CALC-SCNC: 17 MMOL/L (ref 7–16)
BASOPHILS # BLD AUTO: 0.3 % (ref 0–1.8)
BASOPHILS # BLD: 0.02 K/UL (ref 0–0.12)
BUN SERPL-MCNC: 19 MG/DL (ref 8–22)
CALCIUM SERPL-MCNC: 10 MG/DL (ref 8.5–10.5)
CHLORIDE SERPL-SCNC: 108 MMOL/L (ref 96–112)
CO2 SERPL-SCNC: 18 MMOL/L (ref 20–33)
CREAT SERPL-MCNC: 1.05 MG/DL (ref 0.5–1.4)
EOSINOPHIL # BLD AUTO: 0.02 K/UL (ref 0–0.51)
EOSINOPHIL NFR BLD: 0.3 % (ref 0–6.9)
ERYTHROCYTE [DISTWIDTH] IN BLOOD BY AUTOMATED COUNT: 38.8 FL (ref 35.9–50)
GFR SERPLBLD CREATININE-BSD FMLA CKD-EPI: 101 ML/MIN/1.73 M 2
GLUCOSE SERPL-MCNC: 84 MG/DL (ref 65–99)
HCT VFR BLD AUTO: 46.6 % (ref 42–52)
HGB BLD-MCNC: 16.7 G/DL (ref 14–18)
IMM GRANULOCYTES # BLD AUTO: 0.02 K/UL (ref 0–0.11)
IMM GRANULOCYTES NFR BLD AUTO: 0.3 % (ref 0–0.9)
LYMPHOCYTES # BLD AUTO: 1.74 K/UL (ref 1–4.8)
LYMPHOCYTES NFR BLD: 27 % (ref 22–41)
MCH RBC QN AUTO: 31 PG (ref 27–33)
MCHC RBC AUTO-ENTMCNC: 35.8 G/DL (ref 32.3–36.5)
MCV RBC AUTO: 86.5 FL (ref 81.4–97.8)
MONOCYTES # BLD AUTO: 0.5 K/UL (ref 0–0.85)
MONOCYTES NFR BLD AUTO: 7.8 % (ref 0–13.4)
NEUTROPHILS # BLD AUTO: 4.15 K/UL (ref 1.82–7.42)
NEUTROPHILS NFR BLD: 64.3 % (ref 44–72)
NRBC # BLD AUTO: 0 K/UL
NRBC BLD-RTO: 0 /100 WBC (ref 0–0.2)
PLATELET # BLD AUTO: 305 K/UL (ref 164–446)
PMV BLD AUTO: 9.4 FL (ref 9–12.9)
POC BREATHALIZER: 0 PERCENT (ref 0–0.01)
POTASSIUM SERPL-SCNC: 4 MMOL/L (ref 3.6–5.5)
RBC # BLD AUTO: 5.39 M/UL (ref 4.7–6.1)
SODIUM SERPL-SCNC: 143 MMOL/L (ref 135–145)
WBC # BLD AUTO: 6.5 K/UL (ref 4.8–10.8)

## 2024-12-30 PROCEDURE — 80048 BASIC METABOLIC PNL TOTAL CA: CPT

## 2024-12-30 PROCEDURE — 85025 COMPLETE CBC W/AUTO DIFF WBC: CPT

## 2024-12-30 PROCEDURE — 302970 POC BREATHALIZER: Performed by: EMERGENCY MEDICINE

## 2024-12-30 PROCEDURE — 99284 EMERGENCY DEPT VISIT MOD MDM: CPT

## 2024-12-30 ASSESSMENT — FIBROSIS 4 INDEX: FIB4 SCORE: 0.39

## 2024-12-30 NOTE — ED NOTES
"BIB EMS. Bystander called and noted pt to be \"acting weird\".   Pt is a poor historian.   +meth use     Denies SI/HI  "

## 2024-12-30 NOTE — ED PROVIDER NOTES
ER Provider Note    Scribed for Stacie Osorio M.D. by Kirt Carlton. 12/30/2024  8:27 AM    Primary Care Provider: Pcp Pt States None    CHIEF COMPLAINT   Chief Complaint   Patient presents with    Psych Eval     EXTERNAL RECORDS REVIEWED  Outpatient Notes Patient has been seen 6 times since 11/1/2024. He was seen once at [facility] and all other visits at [facility]. He was at [facility] 12/2/2024 with abnormal behavior. He was found trespassing at the [place] and has a history of methamphetamine use. He was tangential and had psychomotor agitation. His creatinine was 1 at that time and he received Haldol and Ativan with improvement of his agitation. They thought his symptoms were due to meth use. Patient was last seen yesterday with agitation and shakiness. He was unable to sit still and said he maybe took meth. He was brought in because he [place] of a [place] appearing agitated. Yesterday he had a creatinine of 0.76 and positive for amphetamines. All other ER visits have been notable for meth use.       HPI/ROS  LIMITATION TO HISTORY   Select: : None  OUTSIDE HISTORIAN(S):  None    Thomas James Dancer V is a 24 y.o. male who presents to the ED via EMS for evaluation of bilateral flank pain, onset this morning. The patient reports he felt fine yesterday and developed flank pain this morning. He notes he has experienced pain like this previously. He denies cough, vomiting, diarrhea, hematuria, sore throat, rhinorrhea, bloody stools, difficulty passing urine, abdominal pain, suicidal ideations, homicidal ideations, or any auditory or visual hallucinations. He states he takes Risperdal, however he has not been taking for about a month as he has not refilled the prescription. He reports a history or marijuana and methamphetamine use. He states he is unsure the last time he used methamphetamine. He states he has not smoked marijuana in at least a week. He denies cocaine, heroine, or fentanyl use. He  "denies daily alcohol use, noting the last time he drank was over a month ago. He reports he is tolerating fluids and eating normally. He states he is [place], but was over \"hanging out\" with friends at some [place] here in [place].He reports acute failure of his kidneys for the last 2 months, however he does not see an nephrologist and has not received dialysis treatment. He denies seeing a primary care regularly.    PAST MEDICAL HISTORY  History reviewed. No pertinent past medical history.    SURGICAL HISTORY  History reviewed. No pertinent surgical history.    FAMILY HISTORY  History reviewed. No pertinent family history.    SOCIAL HISTORY   reports that he has been smoking. He has never used smokeless tobacco. He reports current alcohol use. He reports current drug use.    CURRENT MEDICATIONS  There are no discharge medications for this patient.      ALLERGIES  Bee    PHYSICAL EXAM  /88   Pulse 86   Temp 36.3 °C (97.3 °F) (Temporal)   Resp 18   Ht 1.778 m (5' 10\")   Wt 77.1 kg (170 lb)   SpO2 94%   BMI 24.39 kg/m²   Constitutional: Well developed, well nourished; Non-toxic appearance. Anxious and a little agitated. Disheveled.    HENT: Normocephalic, atraumatic; Bilateral external ears normal; Oropharynx with dry mucous membranes;   Eyes: PERRL, EOMI, Conjunctiva normal. No discharge.   Neck:  Supple, nontender midline; No stridor; No nuchal rigidity.   Lymphatic: No cervical lymphadenopathy noted.   Cardiovascular: Tachycardic without murmurs, rubs, or gallop.   Thorax & Lungs: No respiratory distress, breath sounds clear to auscultation bilaterally without wheezing, rales or rhonchi. Nontender chest wall. No crepitus or subcutaneous air  Abdomen: Soft, nontender, bowel sounds normal. No obvious masses; No pulsatile masses; no rebound, guarding, or peritoneal signs.   Skin: Good color; warm and dry without rash or petechia.  Back: Nontender, No CVA " tenderness.   Extremities: Distal radial, dorsalis pedis, posterior tibial pulses are equal bilaterally; No edema; Nontender calves or saphenous, No cyanosis, No clubbing.   Musculoskeletal: Good range of motion in all major joints. No tenderness to palpation or major deformities noted.   Neurologic: Alert & oriented x 4, clear speech. Not suicidal. Not homicidal. Denies auditory hallucinations.       DIAGNOSTIC STUDIES    EKG/LABS  Results for orders placed or performed during the hospital encounter of 12/30/24   POC BREATHALIZER    Collection Time: 12/30/24  9:01 AM   Result Value Ref Range    POC Breathalizer 0.00 0.00 - 0.01 Percent   CBC WITH DIFFERENTIAL    Collection Time: 12/30/24  9:09 AM   Result Value Ref Range    WBC 6.5 4.8 - 10.8 K/uL    RBC 5.39 4.70 - 6.10 M/uL    Hemoglobin 16.7 14.0 - 18.0 g/dL    Hematocrit 46.6 42.0 - 52.0 %    MCV 86.5 81.4 - 97.8 fL    MCH 31.0 27.0 - 33.0 pg    MCHC 35.8 32.3 - 36.5 g/dL    RDW 38.8 35.9 - 50.0 fL    Platelet Count 305 164 - 446 K/uL    MPV 9.4 9.0 - 12.9 fL    Neutrophils-Polys 64.30 44.00 - 72.00 %    Lymphocytes 27.00 22.00 - 41.00 %    Monocytes 7.80 0.00 - 13.40 %    Eosinophils 0.30 0.00 - 6.90 %    Basophils 0.30 0.00 - 1.80 %    Immature Granulocytes 0.30 0.00 - 0.90 %    Nucleated RBC 0.00 0.00 - 0.20 /100 WBC    Neutrophils (Absolute) 4.15 1.82 - 7.42 K/uL    Lymphs (Absolute) 1.74 1.00 - 4.80 K/uL    Monos (Absolute) 0.50 0.00 - 0.85 K/uL    Eos (Absolute) 0.02 0.00 - 0.51 K/uL    Baso (Absolute) 0.02 0.00 - 0.12 K/uL    Immature Granulocytes (abs) 0.02 0.00 - 0.11 K/uL    NRBC (Absolute) 0.00 K/uL   BASIC METABOLIC PANEL    Collection Time: 12/30/24  9:09 AM   Result Value Ref Range    Sodium 143 135 - 145 mmol/L    Potassium 4.0 3.6 - 5.5 mmol/L    Chloride 108 96 - 112 mmol/L    Co2 18 (L) 20 - 33 mmol/L    Glucose 84 65 - 99 mg/dL    Bun 19 8 - 22 mg/dL    Creatinine 1.05 0.50 - 1.40 mg/dL    Calcium 10.0 8.5 - 10.5 mg/dL    Anion Gap 17.0 (H)  "7.0 - 16.0   ESTIMATED GFR    Collection Time: 12/30/24  9:09 AM   Result Value Ref Range    GFR (CKD-EPI) 101 >60 mL/min/1.73 m 2           COURSE & MEDICAL DECISION MAKING     ASSESSMENT, COURSE AND PLAN  Care Narrative: Patient presents to the ER via ambulance after bystanders reported that he was \"acting weird.\"  Patient is a methamphetamine user.  He admits to using methamphetamine but will not tell me when he last used.  He is well-known to this emergency department, having been here 5 times alone in the last few weeks.  Patient's complaint today was some discomfort in his low back.  He says he was worried about his kidneys.  Patient was seen here yesterday and had lab work done which was normal.  No abnormal kidney function yesterday.  I repeated labs today.  His labs were slowly coming back, the patient told the nurse he wanted to leave AGAINST MEDICAL ADVICE.  He was not intoxicated.  He was alert and oriented x 4 when I evaluated him at bedside upon arrival.  He was not suicidal or homicidal.  He denies auditory hallucinations.  His vitals are stable other than some mild tachycardia, likely related to methamphetamine use.  He had a benign abdominal examination.  He did not have any CVA tenderness.  Blood work today showed normal kidney function.  The nurse called me to tell me the patient wanted to leave AGAINST MEDICAL ADVICE.  The nurse to asked him to wait to speak to me, but he refused and he signed the AMA form and ambulated out to the lobby with steady gait.    ED OBS: No; Patient does not meet criteria for ED Observation.     8:20 AM - Patient seen and examined at bedside. Discussed plan of care, including lab work. Patient agrees to the plan of care.    9:30 AM - Nursing staff informed me the patient wishes to leave against medical advice at this time. The patient is leaving against medical advice.   The patient is not intoxicated.  The patient is a capable decision maker based upon my initial " evaluation.  He did not wait for ERMD to come back to talk to him before he left AMA.  While I certainly disagree with the patient's decision, I respect the patient's autonomy and will not keep them here against their will. They understand that their decision to leave can be reversed at any time and they can return to us at any time for any reason at all.     ADDITIONAL PROBLEM LIST  Problem 1: Back pain which started within the last 24 hours    DISPOSITION AND DISCUSSIONS  I have discussed management of the patient with the following physicians and EDITH's:  None    Discussion of management with other [facility] or appropriate source(s): None     Escalation of care considered, and ultimately not performed: RN informing that the patient wants to leave AMA.  He did not wait for MD discussion    Barriers to care at this time, including but not limited to: Patient does not have established PCP and Patient does not have insurance.     Decision tools and prescription drugs considered including, but not limited to:  Patient left AMA and therefore did not get any medications. .    Patient is leaving AGAINST MEDICAL ADVICE.       FINAL DIAGNOSIS  1. Methamphetamine abuse (HCC) Acute   2. Bilateral low back pain without sciatica, unspecified chronicity Acute   AMA    This dictation has been created using voice recognition software. The accuracy of the dictation is limited by the abilities of the software. I expect there may be some errors of grammar and possibly content. I made every attempt to manually correct the errors within my dictation. However, errors related to voice recognition software may still exist and should be interpreted within the appropriate context.    Kirt MELISSA (Leah), am scribing for, and in the presence of, Stacie Osorio M.D..    Electronically signed by: Kirt Carlton (Leah), 12/30/2024    Stacie MELISSA M.D. personally performed the services described in this documentation, as scribed by Kirt Carlton in  my presence, and it is both accurate and complete.       The note accurately reflects work and decisions made by me.  Stacie Osorio M.D.  12/30/2024  4:28 PM

## 2024-12-30 NOTE — ED NOTES
0929 Pt expressed wanting to leave hospital.   0930 Dr. Osorio notified.   0936 Vitals taken, pt signed AMA form and ambulated to lobby.    Orders received for straight cath from night shift MD. Patient up to bathroom this morning, post void bladder scan showed 35 ml. Patient not straight cathed at this time.

## 2024-12-30 NOTE — ED NOTES
Pt repeatedly asking for toothbrush/toothpaste. Pt provided toothbrush/paste and ambulated to bathroom to brush teeth. Unable to provide urine sample at this time.

## 2025-01-02 ENCOUNTER — HOSPITAL ENCOUNTER (EMERGENCY)
Facility: MEDICAL CENTER | Age: 25
End: 2025-01-02
Attending: EMERGENCY MEDICINE

## 2025-01-02 VITALS
HEART RATE: 99 BPM | RESPIRATION RATE: 16 BRPM | DIASTOLIC BLOOD PRESSURE: 87 MMHG | WEIGHT: 170 LBS | TEMPERATURE: 97.8 F | OXYGEN SATURATION: 95 % | HEIGHT: 70 IN | SYSTOLIC BLOOD PRESSURE: 162 MMHG | BODY MASS INDEX: 24.34 KG/M2

## 2025-01-02 DIAGNOSIS — F15.929 METHAMPHETAMINE INTOXICATION (HCC): ICD-10-CM

## 2025-01-02 DIAGNOSIS — F15.10 METHAMPHETAMINE ABUSE (HCC): ICD-10-CM

## 2025-01-02 LAB
APPEARANCE UR: CLEAR
BILIRUB UR QL STRIP.AUTO: NEGATIVE
COLOR UR: YELLOW
GLUCOSE UR STRIP.AUTO-MCNC: NEGATIVE MG/DL
KETONES UR STRIP.AUTO-MCNC: NEGATIVE MG/DL
LEUKOCYTE ESTERASE UR QL STRIP.AUTO: NEGATIVE
MICRO URNS: NORMAL
NITRITE UR QL STRIP.AUTO: NEGATIVE
PH UR STRIP.AUTO: 5.5 [PH] (ref 5–8)
PROT UR QL STRIP: NEGATIVE MG/DL
RBC UR QL AUTO: NEGATIVE
SP GR UR STRIP.AUTO: 1.02
UROBILINOGEN UR STRIP.AUTO-MCNC: 0.2 EU/DL

## 2025-01-02 PROCEDURE — 99284 EMERGENCY DEPT VISIT MOD MDM: CPT

## 2025-01-02 PROCEDURE — 81003 URINALYSIS AUTO W/O SCOPE: CPT

## 2025-01-02 ASSESSMENT — FIBROSIS 4 INDEX: FIB4 SCORE: 0.33

## 2025-01-02 NOTE — ED NOTES
Bedside report received from off going RN/tech: Melani, assumed care of patient.  POC discussed with patient. Call light within reach, all needs addressed at this time.       Fall risk interventions in place: Not Applicable (all applicable per [facility] Fall risk assessment)   Continuous monitoring: Pulse Ox or Blood Pressure  IVF/IV medications: Not Applicable   Oxygen: Room Air  Bedside sitter: Not Applicable   Isolation: Not Applicable

## 2025-01-02 NOTE — ED TRIAGE NOTES
"Chief Complaint   Patient presents with    Flank Pain    Back Pain     [place]. PT endorses having several drink and \" wants to get checked out because he hasn't felt right since leaving casino\". Pt denies any drug use tonight. Pt is tachycardic and pupils appear dilated at 5mm. Pt unable to report quantity and quality of pain.   Pt is appears restless and anxious but cooperative.  "

## 2025-01-02 NOTE — ED PROVIDER NOTES
ED Provider Note    CHIEF COMPLAINT  Chief Complaint   Patient presents with    Flank Pain    Back Pain       EXTERNAL RECORDS REVIEWED  Extensive emergency department visits from both our emergency department and [facility].  These are typically involving acute methamphetamine intoxication or abuse or resource seeking secondary to homelessness.  Most recently seen at [facility] 12/2/2024 for abnormal behavior, in the context of polysubstance abuse    HPI/ROS  LIMITATION TO HISTORY   Select: Intoxication      Thomas James Dancer V is a 24 y.o. male who presents with chief complaint of dry mouth.  In triage patient was complaining of flank pain.  Patient seen regularly in the Emergency Department for this.  Patient had recent workup into this including basic labs, CBC, CMP, both of which were unremarkable.  Normal urinalysis and no evidence of KERRY.  Patient was found to be positive for methamphetamine.  Patient admits to ongoing methamphetamine use.  He actually denies any flank pain to me, he states that his mouth feels dry.  He denies any associated back pain.  Patient reports that he smokes and eats meth.  He denies any IV meth use.  Patient states he is worried he is going into kidney failure, when asked about why He is unsure.  He denies any dysuria urgency frequency, he continues to make normal urine.  He denies any associated testicular pain.  He continues to deny any back or flank pain to me on multiple lines of questioning.  Patient denies any associated abdominal pain.  Patient states that he left AGAINST MEDICAL ADVICE yesterday but is here to give us a urine sample.    PAST MEDICAL HISTORY       SURGICAL HISTORY  patient denies any surgical history    FAMILY HISTORY  No family history on file.    SOCIAL HISTORY  Social History     Tobacco Use    Smoking status: Every Day    Smokeless tobacco: Never   Vaping Use    Vaping status: Never Used   Substance  "and Sexual Activity    Alcohol use: Yes    Drug use: Yes     Comment: meth    Sexual activity: Not on file       CURRENT MEDICATIONS  Home Medications    **Home medications have not yet been reviewed for this encounter**         ALLERGIES  Allergies   Allergen Reactions    Bee Anaphylaxis and Swelling     Patient also reports he swells where he gets stung        PHYSICAL EXAM  VITAL SIGNS: BP (!) 157/89   Pulse (!) 114   Temp 36.8 °C (98.3 °F)   Resp 18   Ht 1.778 m (5' 10\")   Wt 77.1 kg (170 lb)   SpO2 98%   BMI 24.39 kg/m²    Physical Exam  Constitutional:       Appearance: Normal appearance.   HENT:      Head: Normocephalic.      Right Ear: Tympanic membrane normal.      Left Ear: Tympanic membrane normal.      Nose: Nose normal.      Mouth/Throat:      Mouth: Mucous membranes are moist.   Eyes:      Comments: Dilated pupils   Cardiovascular:      Rate and Rhythm: Regular rhythm. Tachycardia present.   Pulmonary:      Effort: Pulmonary effort is normal. No respiratory distress.      Breath sounds: Normal breath sounds. No stridor. No wheezing or rales.   Chest:      Chest wall: No tenderness.   Abdominal:      General: Abdomen is flat. There is no distension.      Palpations: Abdomen is soft. There is no mass.      Tenderness: There is no abdominal tenderness.   Musculoskeletal:      Cervical back: Normal range of motion.      Comments: No cervical, thoracic, or lumbar spine tenderness.  No paraspinal tenderness.  Bilateral lower extremity strength is 5 out of 5 in distal and posterior musculature.  Sensation intact throughout.  No associated clonus.   Skin:     General: Skin is warm.      Capillary Refill: Capillary refill takes less than 2 seconds.   Neurological:      General: No focal deficit present.      Mental Status: He is alert and oriented to person, place, and time.   Psychiatric:      Comments: Tangential           EKG/LABS  Results for orders placed or performed during the hospital encounter " of 01/02/25   URINALYSIS    Collection Time: 01/02/25  6:40 AM    Specimen: Urine   Result Value Ref Range    Color Yellow     Character Clear     Specific Gravity 1.024 <1.035    Ph 5.5 5.0 - 8.0    Glucose Negative Negative mg/dL    Ketones Negative Negative mg/dL    Protein Negative Negative mg/dL    Bilirubin Negative Negative    Urobilinogen, Urine 0.2 <=1.0 EU/dL    Nitrite Negative Negative    Leukocyte Esterase Negative Negative    Occult Blood Negative Negative    Micro Urine Req see below        I have independently interpreted this EKG        COURSE & MEDICAL DECISION MAKING    ASSESSMENT, COURSE AND PLAN  Care Narrative: Patient here, he appears acutely intoxicated on methamphetamine.  He has some complaints of a dry mouth but has a normal exam otherwise outside of his findings consistent with methamphetamine intoxication.  He has a normal neurologic exam, he was complaining of flank pain in triage apparently but not today.  He was also complaining of this yesterday, he had a workup yesterday which was unremarkable.  He denies any IVDU, stating he only smokes and eats methamphetamine.  Patient states he was here to give us a urine sample, will check this though my suspicion of infection is very low here.  In the absence of any complaint of ongoing back pain is difficult to argue for the indication of any imaging at this point.  Patient's urinalysis is unremarkable.  His heart rate has improved.  Initial tachycardia secondary to acute methamphetamine abuse.  He is not acutely psychotic.  We have stressed the importance of patient's stop using meth. patient will be discharged.                DISPOSITION AND DISCUSSIONS      Escalation of care considered, and ultimately not performed: Diagnostic serology, and diagnostic imaging was deferred in this very well-appearing patient with presentation consistent with acute methamphetamine intoxication.  Patient without any complaint of back pain on my exam, he has  a reassuring neurologic exam.  He denies any history of IVDU.  He has benign abdominal exam.    Barriers to care at this time, including but not limited to: Patient does not have established PCP, Patient is homeless, and Patient had difficult affording medications.       FINAL DIAGNOSIS  1. Methamphetamine abuse (HCC)        2. Methamphetamine intoxication (HCC)

## 2025-01-05 ENCOUNTER — HOSPITAL ENCOUNTER (EMERGENCY)
Facility: MEDICAL CENTER | Age: 25
End: 2025-01-06
Attending: EMERGENCY MEDICINE

## 2025-01-05 DIAGNOSIS — R00.0 TACHYCARDIA: ICD-10-CM

## 2025-01-05 DIAGNOSIS — F41.9 ANXIETY: ICD-10-CM

## 2025-01-05 DIAGNOSIS — F15.10 METHAMPHETAMINE ABUSE (HCC): ICD-10-CM

## 2025-01-05 LAB
ALBUMIN SERPL BCP-MCNC: 4.8 G/DL (ref 3.2–4.9)
ALBUMIN/GLOB SERPL: 1.7 G/DL
ALP SERPL-CCNC: 62 U/L (ref 30–99)
ALT SERPL-CCNC: 18 U/L (ref 2–50)
AMPHET UR QL SCN: POSITIVE
ANION GAP SERPL CALC-SCNC: 13 MMOL/L (ref 7–16)
AST SERPL-CCNC: 36 U/L (ref 12–45)
BARBITURATES UR QL SCN: NEGATIVE
BENZODIAZ UR QL SCN: POSITIVE
BILIRUB SERPL-MCNC: 0.8 MG/DL (ref 0.1–1.5)
BUN SERPL-MCNC: 11 MG/DL (ref 8–22)
BZE UR QL SCN: NEGATIVE
CALCIUM ALBUM COR SERPL-MCNC: 9.2 MG/DL (ref 8.5–10.5)
CALCIUM SERPL-MCNC: 9.8 MG/DL (ref 8.5–10.5)
CANNABINOIDS UR QL SCN: NEGATIVE
CHLORIDE SERPL-SCNC: 107 MMOL/L (ref 96–112)
CO2 SERPL-SCNC: 22 MMOL/L (ref 20–33)
CREAT SERPL-MCNC: 0.97 MG/DL (ref 0.5–1.4)
FENTANYL UR QL: NEGATIVE
GFR SERPLBLD CREATININE-BSD FMLA CKD-EPI: 112 ML/MIN/1.73 M 2
GLOBULIN SER CALC-MCNC: 2.8 G/DL (ref 1.9–3.5)
GLUCOSE SERPL-MCNC: 100 MG/DL (ref 65–99)
METHADONE UR QL SCN: NEGATIVE
OPIATES UR QL SCN: NEGATIVE
OXYCODONE UR QL SCN: NEGATIVE
PCP UR QL SCN: NEGATIVE
POTASSIUM SERPL-SCNC: 3.8 MMOL/L (ref 3.6–5.5)
PROPOXYPH UR QL SCN: NEGATIVE
PROT SERPL-MCNC: 7.6 G/DL (ref 6–8.2)
SODIUM SERPL-SCNC: 142 MMOL/L (ref 135–145)

## 2025-01-05 PROCEDURE — 99285 EMERGENCY DEPT VISIT HI MDM: CPT

## 2025-01-05 PROCEDURE — A9270 NON-COVERED ITEM OR SERVICE: HCPCS | Performed by: EMERGENCY MEDICINE

## 2025-01-05 PROCEDURE — 700102 HCHG RX REV CODE 250 W/ 637 OVERRIDE(OP): Performed by: EMERGENCY MEDICINE

## 2025-01-05 PROCEDURE — 80307 DRUG TEST PRSMV CHEM ANLYZR: CPT

## 2025-01-05 PROCEDURE — 700105 HCHG RX REV CODE 258: Performed by: EMERGENCY MEDICINE

## 2025-01-05 PROCEDURE — 80053 COMPREHEN METABOLIC PANEL: CPT

## 2025-01-05 PROCEDURE — 96372 THER/PROPH/DIAG INJ SC/IM: CPT

## 2025-01-05 PROCEDURE — 700111 HCHG RX REV CODE 636 W/ 250 OVERRIDE (IP): Performed by: EMERGENCY MEDICINE

## 2025-01-05 RX ORDER — LORAZEPAM 1 MG/1
1 TABLET ORAL ONCE
Status: COMPLETED | OUTPATIENT
Start: 2025-01-05 | End: 2025-01-05

## 2025-01-05 RX ORDER — LORAZEPAM 2 MG/ML
2 INJECTION INTRAMUSCULAR ONCE
Status: COMPLETED | OUTPATIENT
Start: 2025-01-05 | End: 2025-01-05

## 2025-01-05 RX ORDER — SODIUM CHLORIDE 9 MG/ML
1000 INJECTION, SOLUTION INTRAVENOUS ONCE
Status: COMPLETED | OUTPATIENT
Start: 2025-01-05 | End: 2025-01-05

## 2025-01-05 RX ORDER — HALOPERIDOL 5 MG/1
5 TABLET ORAL ONCE
Status: COMPLETED | OUTPATIENT
Start: 2025-01-05 | End: 2025-01-05

## 2025-01-05 RX ADMIN — LORAZEPAM 1 MG: 1 TABLET ORAL at 10:50

## 2025-01-05 RX ADMIN — SODIUM CHLORIDE 1000 ML: 9 INJECTION, SOLUTION INTRAVENOUS at 16:30

## 2025-01-05 RX ADMIN — HALOPERIDOL 5 MG: 5 TABLET ORAL at 20:07

## 2025-01-05 RX ADMIN — LORAZEPAM 1 MG: 1 TABLET ORAL at 15:40

## 2025-01-05 RX ADMIN — LORAZEPAM 2 MG: 2 INJECTION INTRAMUSCULAR; INTRAVENOUS at 17:44

## 2025-01-05 ASSESSMENT — FIBROSIS 4 INDEX: FIB4 SCORE: 0.33

## 2025-01-05 NOTE — ED NOTES
Bedside report received from off going RN: Hawa, assumed care of patient.  Pt lying in bed, covered in blanket, alert, restless.       Fall risk interventions in place: Not Applicable (all applicable per [place] Fall risk assessment)   Continuous monitoring: Not Applicable   IVF/IV medications: Not Applicable   Oxygen: Room Air  Bedside sitter: Not Applicable   Isolation: Not Applicable

## 2025-01-05 NOTE — ED PROVIDER NOTES
"ED Provider Note    CHIEF COMPLAINT  Chief Complaint   Patient presents with    Psych Eval       EXTERNAL RECORDS REVIEWED  External ED Note seen December 2, 2024 [place], evaluated and released from [facility]    HPI/ROS  LIMITATION TO HISTORY   Select: Intoxication  OUTSIDE HISTORIAN(S):  EMS gave report on patient en route to the hospital, condition on scene    Thomas James Dancer V is a 24 y.o. male who presents by ambulance, sent in from a local restaurant where he was acting strange.  Review of chart shows multiple visits to the ER for abnormal behavior and methamphetamine use.  Patient denies suicidal homicidal ideation.  No chest pain or difficulty breathing.  Asked how I can help the patient today, he states \"can you help me fix his blanket.\"  No history of trauma.    PAST MEDICAL HISTORY   Methamphetamine abuse    SURGICAL HISTORY  patient denies any surgical history    FAMILY HISTORY  History reviewed. No pertinent family history.    SOCIAL HISTORY  Social History     Tobacco Use    Smoking status: Every Day    Smokeless tobacco: Never   Vaping Use    Vaping status: Never Used   Substance and Sexual Activity    Alcohol use: Yes    Drug use: Yes     Comment: meth    Sexual activity: Not on file       CURRENT MEDICATIONS  Home Medications       Reviewed by Areli Akers R.N. (Registered Nurse) on 01/05/25 at 1028  Med List Status: Not Addressed     Medication Last Dose Status        Patient Reji Taking any Medications                           ALLERGIES  Allergies   Allergen Reactions    Bee Anaphylaxis and Swelling     Patient also reports he swells where he gets stung        PHYSICAL EXAM  VITAL SIGNS: BP (!) 170/85   Pulse (!) 130   Temp 36.5 °C (97.7 °F) (Temporal)   Resp 17   Ht 1.778 m (5' 10\")   Wt 77.1 kg (169 lb 15.6 oz)   SpO2 98%   BMI 24.39 kg/m²    Psychiatric: Agitation.  No suicidal ideation  Neurologic: Strength and sensation intact  GI " abdomen is soft and nontender  Cardiac: Tachycardic rate, regular rhythm  Respiratory: Clear lung sounds  Eyes: No scleral icterus        COURSE & MEDICAL DECISION MAKING    ASSESSMENT, COURSE AND PLAN  Care Narrative: Patient presents tachycardic after using methamphetamine this morning.  Review of prior visits to the ER show similar complaints on multiple previous visits.  Patient denies suicidal homicidal ideation.  Patient remains tachycardic at this time, I suspect secondary to methamphetamine toxicity.  He has no chest pain, difficulties breathing.  His oxygenation has remained normal, blood pressure normal as well.  Patient been given Ativan, twice for help with agitation.  He remains tachycardic at this time.  He was given oral fluids, he tolerated drinking a full glass of water without difficulty.  Patient be placed under observation until vital signs normalized and improvement from his agitation.    ED OBS: Yes; I am placing the patient in to an observation status due to a diagnostic uncertainty as well as therapeutic intensity. Patient placed in observation status at 10:30 AM, 1/5/2025.     Observation plan is as follows: Serial observation, recheck of vital signs.  Treatment for his agitation      DISPOSITION AND DISCUSSIONS    Escalation of care considered, and ultimately not performed:Laboratory analysis    Barriers to care at this time, including but not limited to: Patient does not have established PCP.       FINAL DIAGNOSIS  1. Methamphetamine abuse (HCC)    2. Anxiety    3. Tachycardia         Electronically signed by: Parminder Boyle M.D., 1/5/2025 10:35 AM

## 2025-01-05 NOTE — ED TRIAGE NOTES
Pt BIB [facility] from [facility] tac for abnormal behavior. Pt seen 3 days ago as well. Pt ambulated to stretcher from EMS stretcher. Pt has hx of meth use. No other complaints at this time. Awaiting ERP to see.

## 2025-01-06 VITALS
HEIGHT: 70 IN | WEIGHT: 169.97 LBS | BODY MASS INDEX: 24.33 KG/M2 | OXYGEN SATURATION: 95 % | TEMPERATURE: 97.7 F | SYSTOLIC BLOOD PRESSURE: 110 MMHG | HEART RATE: 80 BPM | RESPIRATION RATE: 16 BRPM | DIASTOLIC BLOOD PRESSURE: 66 MMHG

## 2025-01-06 NOTE — ED NOTES
Report received from LANCE Aparicio. Assumed care of pt, pt resting in bed, fidgeting. Sitter outside room for safety.

## 2025-01-06 NOTE — ED NOTES
Patient's home medications have been reviewed by the pharmacy team.     History reviewed. No pertinent past medical history.    Patient's Medications    No medications on file          A:  Cannot find any home medications on file or in history for this patient.        P:    No recommendations at this time.         Roseline Rojas, PharmD

## 2025-01-06 NOTE — PROGRESS NOTES
"ED Observation Progress Note    Date of Service: 01/06/25    Interval History and Interventions  Overall improved with further detoxification from illicit substance use.  Awaiting transfer to [facility]    Physical Exam  /59   Pulse 87   Temp 36.5 °C (97.7 °F) (Temporal)   Resp 18   Ht 1.778 m (5' 10\")   Wt 77.1 kg (169 lb 15.6 oz)   SpO2 96%   BMI 24.39 kg/m² .    Constitutional: Awake and alert. Nontoxic  HENT:  Grossly normal  Eyes: Grossly normal  Neck: Normal range of motion  Cardiovascular: Normal heart rate   Thorax & Lungs: No respiratory distress  Abdomen: Nontender  Skin:  No pathologic rash.   Extremities: Well perfused  Psychiatric: Affect normal    Labs  Results for orders placed or performed during the hospital encounter of 01/05/25   COMP METABOLIC PANEL    Collection Time: 01/05/25  4:37 PM   Result Value Ref Range    Sodium 142 135 - 145 mmol/L    Potassium 3.8 3.6 - 5.5 mmol/L    Chloride 107 96 - 112 mmol/L    Co2 22 20 - 33 mmol/L    Anion Gap 13.0 7.0 - 16.0    Glucose 100 (H) 65 - 99 mg/dL    Bun 11 8 - 22 mg/dL    Creatinine 0.97 0.50 - 1.40 mg/dL    Calcium 9.8 8.5 - 10.5 mg/dL    Correct Calcium 9.2 8.5 - 10.5 mg/dL    AST(SGOT) 36 12 - 45 U/L    ALT(SGPT) 18 2 - 50 U/L    Alkaline Phosphatase 62 30 - 99 U/L    Total Bilirubin 0.8 0.1 - 1.5 mg/dL    Albumin 4.8 3.2 - 4.9 g/dL    Total Protein 7.6 6.0 - 8.2 g/dL    Globulin 2.8 1.9 - 3.5 g/dL    A-G Ratio 1.7 g/dL   ESTIMATED GFR    Collection Time: 01/05/25  4:37 PM   Result Value Ref Range    GFR (CKD-EPI) 112 >60 mL/min/1.73 m 2   URINE DRUG SCREEN    Collection Time: 01/05/25  5:23 PM   Result Value Ref Range    Amphetamines Urine Positive (A) Negative    Barbiturates Negative Negative    Benzodiazepines Positive (A) Negative    Cocaine Metabolite Negative Negative    Fentanyl, Urine Negative Negative    Methadone Negative Negative    Opiates Negative Negative    Oxycodone Negative Negative    Phencyclidine -Pcp " Negative Negative    Propoxyphene Negative Negative    Cannabinoid Metab Negative Negative       Radiology  No orders to display       Problem List  1.  Illicit substance use    Electronically signed by: Raffaele Cameron M.D., 1/6/2025 11:43 AM

## 2025-01-06 NOTE — ED NOTES
Bedside report received from off going RN/tech: Radha, assumed care of patient.  POC discussed with patient. Call light within reach, all needs addressed at this time.       Fall risk interventions in place: Move the patient closer to the nurse's station, Keep floor surfaces clean and dry, and Accompanied to restroom (all applicable per [place] Fall risk assessment)   Continuous monitoring: Pulse Ox or Blood Pressure  IVF/IV medications: Not Applicable   Oxygen: Room Air  Bedside sitter: Report given to sitter (safety)  Isolation: Not Applicable

## 2025-01-06 NOTE — ED NOTES
Pt awake and asking to watch TV.  Turned TV on for pt. Pt denies any other needs at this time. Sitter remains at bedside.

## 2025-01-06 NOTE — ED NOTES
Pt ambulating in corner of room, naked, urinating to urinal. IV pulled out. Approximately 250 mL of bolus remaining. Pt dressed and placed back to bed on equipment. Urine to lab.

## 2025-01-06 NOTE — ED NOTES
Pt provided with supplies to brush teeth and hair. Pt appears to be acting appropriately at this time. Sitter remains at bedside.

## 2025-01-06 NOTE — DISCHARGE PLANNING
SUNDEEPS was asked to speak with PT about services to help PT recover. SUNDEEPMONIQUE Kaiser attempted to speak with PT and PT is still in an altered state, and does not know where he is. I called [facility] to see if PT can still receive services there, and before he can go back, he would need to speak with his  there. I will follow up in a few hours to see if he able to communicate.

## 2025-01-06 NOTE — ED NOTES
Unable to address med rec. Patient is unable to participate in interview at this time. Pharmacy on file is [facility] on [address]. No medication dispense history via [facility] within the last 90 days. Unable to confirm whether patient fills at any other pharmacy.

## 2025-01-06 NOTE — ED NOTES
Bedside report received from off going RN: Paris, assumed care of patient.  POC discussed with patient. Call light within reach, all needs addressed at this time.       Fall risk interventions in place: Move the patient closer to the nurse's station, Patient's personal possessions are with in their safe reach, Place socks on patient, Place fall risk sign on patient's door, Give patient urinal if applicable, Keep floor surfaces clean and dry, and Human-sitter if tele-sitter fails (all applicable per [place] Fall risk assessment)   Continuous monitoring: Not Applicable   IVF/IV medications: Not Applicable   Oxygen: Room Air  Bedside sitter: Report given to sitter and checklist completed  Isolation: Not Applicable

## 2025-01-06 NOTE — ED NOTES
Pt d/c home ambulatory to lobby with d/c instructions and cab voucher. Pt given d/c instructions and signed d/c paper. Pt educated on follow up plan, pt verbalized understanding of d/c instructions.  PT vs stable. Pt had all belongings at d/c.

## 2025-01-06 NOTE — ED PROVIDER NOTES
"ED Observation Progress Note    Date of Service: 01/05/25    Interval History  Patient endorses drug use.  We are waiting for this to dissipate, as he has remained tachycardic.  Drug screen is pending, lab work unremarkable at this time.  Patient denies any alcohol use, states only drugs.  He denies any fall or trauma.  No medical complaints.    Physical Exam  /81   Pulse (!) 132   Temp 36.5 °C (97.7 °F) (Temporal)   Resp 20   Ht 1.778 m (5' 10\")   Wt 77.1 kg (169 lb 15.6 oz)   SpO2 96%   BMI 24.39 kg/m² .    Constitutional: Awake and alert. Nontoxic  HENT:  Grossly normal  Eyes: Grossly normal  Neck: Normal range of motion  Cardiovascular: Normal heart rate   Thorax & Lungs: No respiratory distress  Abdomen: Nontender  Skin:  No pathologic rash.   Extremities: Well perfused  Psychiatric: Affect normal    Labs  Results for orders placed or performed during the hospital encounter of 01/05/25   COMP METABOLIC PANEL    Collection Time: 01/05/25  4:37 PM   Result Value Ref Range    Sodium 142 135 - 145 mmol/L    Potassium 3.8 3.6 - 5.5 mmol/L    Chloride 107 96 - 112 mmol/L    Co2 22 20 - 33 mmol/L    Anion Gap 13.0 7.0 - 16.0    Glucose 100 (H) 65 - 99 mg/dL    Bun 11 8 - 22 mg/dL    Creatinine 0.97 0.50 - 1.40 mg/dL    Calcium 9.8 8.5 - 10.5 mg/dL    Correct Calcium 9.2 8.5 - 10.5 mg/dL    AST(SGOT) 36 12 - 45 U/L    ALT(SGPT) 18 2 - 50 U/L    Alkaline Phosphatase 62 30 - 99 U/L    Total Bilirubin 0.8 0.1 - 1.5 mg/dL    Albumin 4.8 3.2 - 4.9 g/dL    Total Protein 7.6 6.0 - 8.2 g/dL    Globulin 2.8 1.9 - 3.5 g/dL    A-G Ratio 1.7 g/dL   ESTIMATED GFR    Collection Time: 01/05/25  4:37 PM   Result Value Ref Range    GFR (CKD-EPI) 112 >60 mL/min/1.73 m 2   URINE DRUG SCREEN    Collection Time: 01/05/25  5:23 PM   Result Value Ref Range    Amphetamines Urine Positive (A) Negative    Barbiturates Negative Negative    Benzodiazepines Positive (A) Negative    Cocaine Metabolite Negative Negative    Fentanyl, " Urine Negative Negative    Methadone Negative Negative    Opiates Negative Negative    Oxycodone Negative Negative    Phencyclidine -Pcp Negative Negative    Propoxyphene Negative Negative    Cannabinoid Metab Negative Negative     10:28 PM  Pt resting very comfortably after the ativan and haldol. Sleeping well, and will be re evaluated in a couple hours for discharge.     Radiology  No orders to display       Problem List  1.   1. Methamphetamine abuse (HCC)        2. Anxiety        3. Tachycardia                Electronically signed by: Kimo Haider D.O., 1/5/2025 5:38 PM

## 2025-01-06 NOTE — DISCHARGE SUMMARY
"  ED Observation Discharge Summary    Patient:Thomas James Dancer V  Patient : 2000  Patient MRN: [id number]  Patient PCP: Pcp Pt States None    Admit Date: 2025  Discharge Date and Time: 25 12:39 PM  Discharge Diagnosis:   1. Methamphetamine abuse (HCC)        2. Anxiety        3. Tachycardia            Discharge Attending: Michael Cabrera M.D.  Discharge Service: ED Observation    ED Course  Parminder is a 24 y.o. male who was evaluated at [facility] for acute psychosis.  The patient has been seen multiple times within this emergency department as well as [facility].  Most recently was on 2025 for methamphetamine abuse.  The patient was discharged because he was no longer psychotic.  Patient was then seen yesterday 2025 for again agitation and altered mental status and methamphetamine abuse.  The patient has been on observation since yesterday.  We are trying to get the patient over to the [facility] but because of his multiple bouts with altered mental status as well as psychosis they did not feel comfortable taking him at the [facility].  At this point the patient is not on a legal hold he is directable and able to be discharged.  At this point we have recommended for the patient to follow-up with [facility].  We cannot certainly stop the patient from doing his methamphetamines and polysubstance abuse.  At this point it is up to the patient to do this.  We will give the patient again resources for his methamphetamine abuse and discharge the patient.      Discharge Exam:  /66   Pulse 80   Temp 36.5 °C (97.7 °F) (Temporal)   Resp 16   Ht 1.778 m (5' 10\")   Wt 77.1 kg (169 lb 15.6 oz)   SpO2 95%   BMI 24.39 kg/m² .    Constitutional: Awake and alert. Nontoxic  HENT:  Grossly normal  Eyes: Grossly normal  Neck: Normal range of motion  Cardiovascular: Normal heart rate   Thorax & Lungs: No respiratory distress  Abdomen: Nontender  Skin:  No pathologic rash.   Extremities: Well " perfused  Psychiatric: Affect normal    Labs  Results for orders placed or performed during the hospital encounter of 01/05/25   COMP METABOLIC PANEL    Collection Time: 01/05/25  4:37 PM   Result Value Ref Range    Sodium 142 135 - 145 mmol/L    Potassium 3.8 3.6 - 5.5 mmol/L    Chloride 107 96 - 112 mmol/L    Co2 22 20 - 33 mmol/L    Anion Gap 13.0 7.0 - 16.0    Glucose 100 (H) 65 - 99 mg/dL    Bun 11 8 - 22 mg/dL    Creatinine 0.97 0.50 - 1.40 mg/dL    Calcium 9.8 8.5 - 10.5 mg/dL    Correct Calcium 9.2 8.5 - 10.5 mg/dL    AST(SGOT) 36 12 - 45 U/L    ALT(SGPT) 18 2 - 50 U/L    Alkaline Phosphatase 62 30 - 99 U/L    Total Bilirubin 0.8 0.1 - 1.5 mg/dL    Albumin 4.8 3.2 - 4.9 g/dL    Total Protein 7.6 6.0 - 8.2 g/dL    Globulin 2.8 1.9 - 3.5 g/dL    A-G Ratio 1.7 g/dL   ESTIMATED GFR    Collection Time: 01/05/25  4:37 PM   Result Value Ref Range    GFR (CKD-EPI) 112 >60 mL/min/1.73 m 2   URINE DRUG SCREEN    Collection Time: 01/05/25  5:23 PM   Result Value Ref Range    Amphetamines Urine Positive (A) Negative    Barbiturates Negative Negative    Benzodiazepines Positive (A) Negative    Cocaine Metabolite Negative Negative    Fentanyl, Urine Negative Negative    Methadone Negative Negative    Opiates Negative Negative    Oxycodone Negative Negative    Phencyclidine -Pcp Negative Negative    Propoxyphene Negative Negative    Cannabinoid Metab Negative Negative       Radiology  No orders to display       Medications:   New Prescriptions    No medications on file       My final assessment includes   1. Methamphetamine abuse (HCC)        2. Anxiety        3. Tachycardia            Upon Reevaluation, the patient's condition has: Improved; and will be discharged.    Patient discharged from ED Observation status at 12:39 PM (Time) 01/06/25  (Date).     Total time spent on this ED Observation discharge encounter is > 30 Minutes    Electronically signed by: Michael Cabrera M.D., 1/6/2025 12:39 PM

## 2025-01-06 NOTE — DISCHARGE SUMMARY
@10:52  - - Collaborated with behavioral health and ED staff, social workers, and other service providers to develop comprehensive care plans that address the unique needs of the PT.  Pt BIB [facility] from [facility] for abnormal behavior. According to Triage RN, Pt ambulated to stretcher from EMS stretcher. Pt has hx of meth use. No other complaints at this time. Pt seen 3 days ago as well. PT presented for C./C of Psyche Eval and was ssen by ERP.  PRSS from prior shift had spoken to [facility] and confirmed PT can return, but [facility] was told they were waiting on approval from .  This PRSS was directed to follow up with [facility].    This PRSS called [facility] at [phone] and was transferred to the 's VM, Tash Pate.  There was no answer and this PRSS left minimum necessary information to facilitate transfer and continuaty of care as well as the PRSS contact info.  Alert and Bedside RN were advised.

## 2025-01-06 NOTE — DISCHARGE PLANNING
"@12:17 - - This PRSS spoke with Tash DEMPSEY) at [facility].  Tash stated that the PT can not access services at the [facility] due to the fact that his \"Mental Health acuity is beyond the level of care\" that they can provide.  PT has no insurance of record and is reported as unsheltered.  PT does have not have insurance on record.  Alert and Bedside RN were advised.  Alert RN suggested getting the PT a cab to the [facility] (Shelter.)    Coordinated with behavioral health and ED staff, social workers, and other service providers to develop comprehensive DC plan that address the unique needs of the PT.  PT was provided with a cab voucher and PT being prepared for imminent discharge.  "

## 2025-01-07 ENCOUNTER — HOSPITAL ENCOUNTER (EMERGENCY)
Facility: MEDICAL CENTER | Age: 25
End: 2025-01-07
Attending: EMERGENCY MEDICINE

## 2025-01-07 VITALS
WEIGHT: 170 LBS | BODY MASS INDEX: 24.34 KG/M2 | HEART RATE: 69 BPM | TEMPERATURE: 98.6 F | DIASTOLIC BLOOD PRESSURE: 75 MMHG | HEIGHT: 70 IN | OXYGEN SATURATION: 95 % | RESPIRATION RATE: 16 BRPM | SYSTOLIC BLOOD PRESSURE: 135 MMHG

## 2025-01-07 DIAGNOSIS — F19.90 SUBSTANCE USE DISORDER: ICD-10-CM

## 2025-01-07 LAB
FLUAV RNA SPEC QL NAA+PROBE: NEGATIVE
FLUBV RNA SPEC QL NAA+PROBE: NEGATIVE
RSV RNA SPEC QL NAA+PROBE: NEGATIVE
SARS-COV-2 RNA RESP QL NAA+PROBE: NOTDETECTED

## 2025-01-07 PROCEDURE — 99283 EMERGENCY DEPT VISIT LOW MDM: CPT

## 2025-01-07 PROCEDURE — 0241U HCHG SARS-COV-2 COVID-19 NFCT DS RESP RNA 4 TRGT ED POC: CPT

## 2025-01-07 ASSESSMENT — FIBROSIS 4 INDEX: FIB4 SCORE: 0.67

## 2025-01-07 NOTE — ED PROVIDER NOTES
"ED Provider Note    CHIEF COMPLAINT  Chief Complaint   Patient presents with    Other     BIB EMS, px was on the street and flagged down PD stating he feels \"odd\". Upon arrival to hospital, is telling RN he just needs a place to relax and feels like he cannot breathe properly. Covid/flu swab ordered.       EXTERNAL RECORDS REVIEWED  Patient was discharged from ED observation yesterday midday.  He was seen with methamphetamine abuse, anxiety and tachycardia.    December 16 he was seen by myself for history of methamphetamine abuse and altered mental status.  Patient was in ED observation at the time as well.    HPI/ROS  LIMITATION TO HISTORY   Select: : None  OUTSIDE HISTORIAN(S):  EMS no interventions in route    Thomas James Dancer V is a 24 y.o. male who presents to the emergency department via EMS.  He apparently, flagged down police noting that he felt strange.  Patient presents here complaining of feeling like he cannot breathe properly.  There is no increased work of breathing noted.  He satting 97% on room air.  Denies any fever cough or cold symptoms.    PAST MEDICAL HISTORY   Methamphetamine abuse.    SURGICAL HISTORY  patient denies any surgical history    FAMILY HISTORY  No family history on file.    SOCIAL HISTORY  Social History     Tobacco Use    Smoking status: Every Day    Smokeless tobacco: Never   Vaping Use    Vaping status: Never Used   Substance and Sexual Activity    Alcohol use: Yes    Drug use: Yes     Comment: meth    Sexual activity: Not on file       CURRENT MEDICATIONS  Home Medications       Reviewed by Luciana Smiental R.N. (Registered Nurse) on 01/07/25 at 0541  Med List Status: Partial     Medication Last Dose Status        Patient Reji Taking any Medications                           ALLERGIES  Allergies   Allergen Reactions    Bee Anaphylaxis and Swelling     Patient also reports he swells where he gets stung        PHYSICAL EXAM  VITAL SIGNS: /84   Pulse (!) 108   Temp " "36.8 °C (98.2 °F) (Temporal)   Resp 20   Ht 1.778 m (5' 10\")   Wt 77.1 kg (170 lb)   SpO2 99%   BMI 24.39 kg/m²    Vitals reviewed.  Constitutional: Patient is oriented to person, place, and time. Appears well-developed and well-nourished. No distress.    Head: Normocephalic and atraumatic.   Mouth/Throat: Oropharynx is clear and moist  Eyes: Conjunctivae are normal.   Neck: Normal range of motion.   Cardiovascular: Tachycardia, regular rhythm and normal heart sounds.   Pulmonary/Chest: Effort normal and breath sounds normal. No respiratory distress, no wheezes, rhonchi, or rales. No chest wall tenderness.  Abdominal: Soft. Bowel sounds are normal. There is no tenderness  Musculoskeletal: No edema and no tenderness.   Neurological:  No cranial nerve deficits. Normal gait. No focal deficits.   Skin: Skin is warm and dry. No erythema. No pallor.     EKG/LABS  Results for orders placed or performed during the hospital encounter of 01/07/25   POC CoV-2, FLU A/B, RSV by PCR    Collection Time: 01/07/25  5:54 AM   Result Value Ref Range    POC Influenza A RNA, PCR Negative Negative    POC Influenza B RNA, PCR Negative Negative    POC RSV, by PCR Negative Negative    POC SARS-CoV-2, PCR NotDetected NotDetected     RADIOLOGY/PROCEDURES     COURSE & MEDICAL DECISION MAKING    ASSESSMENT, COURSE AND PLAN  Care Narrative:     This is a pleasant 24-year-old male.  Brought in by EMS.  He is well-known to this department.  History of methamphetamine abuse.  He is mildly tachycardic today.  There is no increased work of breathing.  Lungs are clear on exam.  Pulse oximetry was reapplied during my evaluation and is 97% on room air.  Patient is reassured by this.  At this point, I see no indication for further emergent evaluation such as nebulizer therapy, steroids or chest x-ray.  Await quadrivalent swab.    8:24 AM patient's quadrivalent swab is negative.  He is reassured by this.  Lung exam again normal.  No increased work of " breathing.  No wheezing.  Satting well on room air.  I see no indication for further emergent evaluation.  He is discharged home in stable condition.    ADDITIONAL PROBLEMS MANAGED    DISPOSITION AND DISCUSSIONS  I have discussed management of the patient with the following physicians and EDITH's:  None    Discussion of management with other [facility] [place] or appropriate source(s): None     Escalation of care considered, and ultimately not performed: None    Barriers to care at this time, including but not limited to: Patient does not have established PCP, Patient had difficult affording medications, Patient lacks financial resources, and substance use disorder .     Decision tools and prescription drugs considered including, but not limited to: None.    FINAL DIAGNOSIS  1. Substance use disorder         Electronically signed by: Paris Song D.O., 1/7/2025 6:19 AM

## 2025-01-07 NOTE — ED NOTES
Bedside report received from off going RN Luciana, assumed care of patient. Pt resting, eyes closed, with even chest rise and fall, call light available and in reach.          Fall risk interventions in place: Keep floor surfaces clean and dry and Accompanied to restroom (all applicable per [place] Fall risk assessment)   Continuous monitoring: Not Applicable   IVF/IV medications: Not Applicable   Oxygen: Room Air  Bedside sitter: Not Applicable   Isolation: Not Applicable

## 2025-01-07 NOTE — ED TRIAGE NOTES
"Chief Complaint   Patient presents with    Other     BIB EMS, px was on the street and flagged down PD stating he feels \"odd\". Upon arrival to hospital, is telling RN he just needs a place to relax and feels like he cannot breathe properly. Covid/flu swab ordered.       25 yo male to triage for above complaint. Ambulatory.     Pt is alert and oriented, speaking in full sentences, follows commands and responds appropriately to questions.       /84   Pulse (!) 108   Temp 36.8 °C (98.2 °F) (Temporal)   Resp 20   Ht 1.778 m (5' 10\")   Wt 77.1 kg (170 lb)   SpO2 99%   BMI 24.39 kg/m²     "

## 2025-01-09 ENCOUNTER — HOSPITAL ENCOUNTER (EMERGENCY)
Facility: MEDICAL CENTER | Age: 25
End: 2025-01-10
Attending: STUDENT IN AN ORGANIZED HEALTH CARE EDUCATION/TRAINING PROGRAM

## 2025-01-09 DIAGNOSIS — F19.10 SUBSTANCE ABUSE (HCC): ICD-10-CM

## 2025-01-09 DIAGNOSIS — F15.929 METHAMPHETAMINE INTOXICATION (HCC): ICD-10-CM

## 2025-01-09 LAB — EKG IMPRESSION: NORMAL

## 2025-01-09 PROCEDURE — 700111 HCHG RX REV CODE 636 W/ 250 OVERRIDE (IP): Mod: JZ | Performed by: STUDENT IN AN ORGANIZED HEALTH CARE EDUCATION/TRAINING PROGRAM

## 2025-01-09 PROCEDURE — 99284 EMERGENCY DEPT VISIT MOD MDM: CPT

## 2025-01-09 PROCEDURE — 96375 TX/PRO/DX INJ NEW DRUG ADDON: CPT

## 2025-01-09 PROCEDURE — 96374 THER/PROPH/DIAG INJ IV PUSH: CPT

## 2025-01-09 PROCEDURE — 93005 ELECTROCARDIOGRAM TRACING: CPT | Mod: TC | Performed by: STUDENT IN AN ORGANIZED HEALTH CARE EDUCATION/TRAINING PROGRAM

## 2025-01-09 RX ORDER — LORAZEPAM 2 MG/ML
2 INJECTION INTRAMUSCULAR ONCE
Status: COMPLETED | OUTPATIENT
Start: 2025-01-09 | End: 2025-01-09

## 2025-01-09 RX ORDER — DROPERIDOL 2.5 MG/ML
2.5 INJECTION, SOLUTION INTRAMUSCULAR; INTRAVENOUS ONCE
Status: COMPLETED | OUTPATIENT
Start: 2025-01-09 | End: 2025-01-09

## 2025-01-09 RX ADMIN — DROPERIDOL 2.5 MG: 2.5 INJECTION, SOLUTION INTRAMUSCULAR; INTRAVENOUS at 21:36

## 2025-01-09 RX ADMIN — LORAZEPAM 2 MG: 2 INJECTION, SOLUTION INTRAMUSCULAR; INTRAVENOUS at 21:34

## 2025-01-09 ASSESSMENT — FIBROSIS 4 INDEX: FIB4 SCORE: 0.67

## 2025-01-10 VITALS
OXYGEN SATURATION: 97 % | SYSTOLIC BLOOD PRESSURE: 140 MMHG | RESPIRATION RATE: 16 BRPM | HEART RATE: 100 BPM | DIASTOLIC BLOOD PRESSURE: 85 MMHG | WEIGHT: 170 LBS | HEIGHT: 70 IN | TEMPERATURE: 98.3 F | BODY MASS INDEX: 24.34 KG/M2

## 2025-01-10 NOTE — ED NOTES
Patient discharged home per ERP.  Discharge teaching and education discussed with patient. POC discussed.   Patient verbalized understanding of discharge teaching and education.   Patient then pulled blanket over head and went to sleep.

## 2025-01-10 NOTE — ED PROVIDER NOTES
CHIEF COMPLAINT  Chief Complaint   Patient presents with    Agitation     Pt approached EMS outside [facility], stating he needed help. Pt denies any drug use, only ETOH tonight. Appears to be attending to internal stimuli, is agitated & fidgety. Pt not answering all questions, is tremulous.  with EMS.        LIMITATION TO HISTORY   Select: Suspected methamphetamine intoxication    HPI    Thomas James Dancer V is a 24 y.o. male who presents to the Emergency Department evaluation of agitation, patient does have a history of methamphetamine use is frequently seen in the emergency departments through the area for substance abuse.  This evening, [place] of [place], he walked up to an ambulance and requested to be transported to the ER.  He is agitated, with mild tangential thoughts, he is unable to verbalize any specific complaints.  Does admit to recent methamphetamine use.    OUTSIDE HISTORIAN(S):  Select: EMS reports the patient walked up to their ambulance to was a agitated requesting help though was unable to verbalize any other specific needs blood glucose was normal    EXTERNAL RECORDS REVIEWED  Select: Other reviewed ED note from 1/7/2020 findings seen for methamphetamine intoxication, reviewed [facility] note from 12/2/2024, patient was seen for methamphetamine abuse      PAST MEDICAL HISTORY  No past medical history on file.  .    SURGICAL HISTORY  No past surgical history on file.      FAMILY HISTORY  No family history on file.       SOCIAL HISTORY  Social History     Socioeconomic History    Marital status: Single     Spouse name: Not on file    Number of children: Not on file    Years of education: Not on file    Highest education level: Not on file   Occupational History    Not on file   Tobacco Use    Smoking status: Every Day    Smokeless tobacco: Never   Vaping Use    Vaping status: Never Used   Substance and Sexual Activity    Alcohol use: Yes    Drug use: Yes     Comment: meth    Sexual  "activity: Not on file   Other Topics Concern    Behavioral problems Not Asked    Interpersonal relationships Not Asked    Sad or not enjoying activities Not Asked    Suicidal thoughts Not Asked    Poor school performance Not Asked    Reading difficulties Not Asked    Speech difficulties Not Asked    Writing difficulties Not Asked    Inadequate sleep Not Asked    Excessive TV viewing Not Asked    Excessive video game use Not Asked    Inadequate exercise Not Asked    Sports related Not Asked    Poor diet Not Asked    Family concerns for drug/alcohol abuse Not Asked    Poor oral hygiene Not Asked    Bike safety Not Asked    Family concerns vehicle safety Not Asked   Social History Narrative    ** Merged History Encounter **         ** Merged History Encounter **          Social Drivers of Health     Financial Resource Strain: Not on file   Food Insecurity: Not on file   Transportation Needs: Not on file   Physical Activity: Not on file   Stress: Not on file   Social Connections: Not on file   Intimate Partner Violence: Not on file   Housing Stability: Not on file         CURRENT MEDICATIONS  No current facility-administered medications on file prior to encounter.     No current outpatient medications on file prior to encounter.           ALLERGIES  Allergies   Allergen Reactions    Bee Anaphylaxis and Swelling     Patient also reports he swells where he gets stung        PHYSICAL EXAM  VITAL SIGNS:/81   Pulse 98   Temp 36.6 °C (97.8 °F) (Temporal)   Resp 18   Ht 1.778 m (5' 10\")   Wt 77.1 kg (170 lb)   SpO2 97%   BMI 24.39 kg/m²       VITALS - vital signs documented prior to this note have been reviewed and noted,  GENERAL -awake alert agitated  HEENT - normocephalic, atraumatic, pupils equal, sclera anicteric, mucus  membranes moist  NECK - supple, no meningismus, full active range of motion, trachea midline  CARDIOVASCULAR - regular rate/rhythm, no murmurs/gallops/rubs  PULMONARY - no respiratory " distress, speaking in full sentences, clear to  auscultation bilaterally, no wheezing/ronchi/rales, no accessory muscle use  GASTROINTESTINAL - soft, non-tender, non-distended, no rebound, guarding,  or peritonitis  GENITOURINARY - Deferred  NEUROLOGIC - Awake alert, normal mental status, speech fluid, cognition  normal, moves all extremities  MUSCULOSKELETAL - no obvious asymmetry or deformities present  EXTREMITIES - warm, well-perfused, no cyanosis or significant edema  DERMATOLOGIC - warm, dry, no rashes, no jaundice  PSYCHIATRIC -agitated tangential thoughts pressured speech, hyperkinetic, no SI or HI denies auditory or visual hallucinations    DIAGNOSTIC STUDIES / PROCEDURES        Radiologist interpretation:   No orders to display        COURSE & MEDICAL DECISION MAKING    ED COURSE:    ED Observation Status? Yes;I am placing the patient in to an observation status due to a diagnostic uncertainty as well as therapeutic intensity. Patient placed in observation status at 9:30 PM 1/9/2025    Observation plan is as follows: Observation for clinical sobriety    INTERVENTIONS BY ME:  Medications   droperidol (Inapsine) injection 2.5 mg (2.5 mg Intravenous Given 1/9/25 2136)   LORazepam (Ativan) injection 2 mg (2 mg Intravenous Given 1/9/25 2134)         Patient discharged from ED observation at 2:19 AM 01/09/25      Reevaluation and 0 219 patient is awake alert answering questions appropriately tachycardia is resolved he states he is feeling better..      Critical care    Critical Care Procedure Note    Total critical care time: Approximately 36 minutes    Upon my assess due to a high probability of clinically significant, life threatening deterioration, secondary to agitation requiring sedation protocol the patient required my direct attention and intervention. This critical care time included obtaining a history; examining the patient; pulse oximetry; ordering and review of studies; arranging urgent treatment  with development of a management plan; evaluation of patient's response to treatment; frequent reassessment; and, discussions with other providers.    was exclusive of separately billable procedures and treating other patients and teaching time.    Reevaluation at 2327 tachycardia has improved heart rate is now 102 he is resting comfortably no acute distress.    INITIAL ASSESSMENT, COURSE AND PLAN  Care Narrative: Patient presented for evaluation of agitation in the setting of a recent methamphetamine use.  Upon arrival in the emergency department the patient is agitated with pressured speech hyperkinetic,  he is notably tachycardic with a heart rate of over 140s.  He was a sedated with droperidol and Ativan for patient and staff safety.  He was placed in ED observation observed for 5 hours.  After which he was awake alert answering questions appropriately he stated that he is feeling much better his tachycardia resolved.  History and physical exam seems consistent with a sympathomimetic methamphetamine intoxication.  At this time given that the patient has achieved clinical sobriety and has no additional complaints he believe he is appropriate for outpatient management.  He is encouraged to follow-up with outpatient detox resources and discharged in stable condition.             ADDITIONAL PROBLEM LIST    DISPOSITION AND DISCUSSIONS      Escalation of care considered, and ultimately not performed:Laboratory analysis    Barriers to care at this time, including but not limited to: Patient does not have established PCP.       FINAL DIAGNOSIS  1. Substance abuse (HCC)    2. Methamphetamine intoxication (HCC)             Electronically signed by: Juanito Sweet DO ,2:19 AM 01/09/25

## 2025-01-10 NOTE — ED NOTES
Pt resting on gurney in nad, even and unlabored respirations noted. Urinal and call light at bedside.

## 2025-01-10 NOTE — ED NOTES
Break RN:  Pt sleeping in bed in view of RN station with even and unlabored breaths. No acute distress is noted at this time and pt is on monitoring devices.

## 2025-01-10 NOTE — ED NOTES
Pt asked if he was ready to leave. Pt asked for 10 more minutes. Pt instructed that he has been up for discharge for 1 hour and has not left yet. Pt seen fidgeting under blanket and then pulled his pants up. Pt up to bathroom prior to leaving unit.

## 2025-01-10 NOTE — ED TRIAGE NOTES
Thomas James Dancer V  24 y.o.  male  Chief Complaint   Patient presents with    Agitation     Pt approached EMS outside [facility], stating he needed help. Pt denies any drug use, only ETOH tonight. Appears to be attending to internal stimuli, is agitated & fidgety. Pt not answering all questions, is tremulous.  with EMS.      Pt BIB EMS from [facility].

## 2025-01-28 ENCOUNTER — TELEPHONE (OUTPATIENT)
Dept: SCHEDULING | Facility: IMAGING CENTER | Age: 25
End: 2025-01-28